# Patient Record
Sex: MALE | Race: WHITE | NOT HISPANIC OR LATINO | Employment: OTHER | URBAN - METROPOLITAN AREA
[De-identification: names, ages, dates, MRNs, and addresses within clinical notes are randomized per-mention and may not be internally consistent; named-entity substitution may affect disease eponyms.]

---

## 2022-08-05 ENCOUNTER — HOSPITAL ENCOUNTER (INPATIENT)
Facility: MEDICAL CENTER | Age: 72
LOS: 12 days | DRG: 872 | End: 2022-08-17
Attending: EMERGENCY MEDICINE | Admitting: STUDENT IN AN ORGANIZED HEALTH CARE EDUCATION/TRAINING PROGRAM
Payer: COMMERCIAL

## 2022-08-05 ENCOUNTER — APPOINTMENT (OUTPATIENT)
Dept: RADIOLOGY | Facility: MEDICAL CENTER | Age: 72
DRG: 872 | End: 2022-08-05
Attending: EMERGENCY MEDICINE
Payer: COMMERCIAL

## 2022-08-05 DIAGNOSIS — G89.3 CHRONIC PAIN DUE TO NEOPLASM: ICD-10-CM

## 2022-08-05 DIAGNOSIS — C18.2 MALIGNANT NEOPLASM OF ASCENDING COLON (HCC): ICD-10-CM

## 2022-08-05 DIAGNOSIS — Z71.89 ADVANCE CARE PLANNING: ICD-10-CM

## 2022-08-05 PROBLEM — A41.9 SEPSIS (HCC): Status: ACTIVE | Noted: 2022-08-05

## 2022-08-05 LAB
ABO + RH BLD: NORMAL
ABO GROUP BLD: NORMAL
ALBUMIN SERPL BCP-MCNC: 3.5 G/DL (ref 3.2–4.9)
ALBUMIN/GLOB SERPL: 0.8 G/DL
ALP SERPL-CCNC: 203 U/L (ref 30–99)
ALT SERPL-CCNC: 11 U/L (ref 2–50)
ANION GAP SERPL CALC-SCNC: 26 MMOL/L (ref 7–16)
ANISOCYTOSIS BLD QL SMEAR: ABNORMAL
AST SERPL-CCNC: 12 U/L (ref 12–45)
BARCODED ABORH UBTYP: 5100
BARCODED PRD CODE UBPRD: NORMAL
BARCODED UNIT NUM UBUNT: NORMAL
BASOPHILS # BLD AUTO: 0 % (ref 0–1.8)
BASOPHILS # BLD: 0 K/UL (ref 0–0.12)
BILIRUB SERPL-MCNC: 0.4 MG/DL (ref 0.1–1.5)
BLD GP AB SCN SERPL QL: NORMAL
BUN SERPL-MCNC: 28 MG/DL (ref 8–22)
CALCIUM SERPL-MCNC: 9.1 MG/DL (ref 8.5–10.5)
CHLORIDE SERPL-SCNC: 91 MMOL/L (ref 96–112)
CO2 SERPL-SCNC: 14 MMOL/L (ref 20–33)
COMPONENT R 8504R: NORMAL
CREAT SERPL-MCNC: 1.47 MG/DL (ref 0.5–1.4)
EOSINOPHIL # BLD AUTO: 0.23 K/UL (ref 0–0.51)
EOSINOPHIL NFR BLD: 0.9 % (ref 0–6.9)
ERYTHROCYTE [DISTWIDTH] IN BLOOD BY AUTOMATED COUNT: 51.8 FL (ref 35.9–50)
GFR SERPLBLD CREATININE-BSD FMLA CKD-EPI: 51 ML/MIN/1.73 M 2
GLOBULIN SER CALC-MCNC: 4.2 G/DL (ref 1.9–3.5)
GLUCOSE SERPL-MCNC: 225 MG/DL (ref 65–99)
HCT VFR BLD AUTO: 25.1 % (ref 42–52)
HGB BLD-MCNC: 6.9 G/DL (ref 14–18)
HGB RETIC QN AUTO: 20 PG/CELL (ref 29–35)
HYPOCHROMIA BLD QL SMEAR: ABNORMAL
IMM RETICS NFR: 34 % (ref 9.3–17.4)
LACTATE SERPL-SCNC: 4.5 MMOL/L (ref 0.5–2)
LYMPHOCYTES # BLD AUTO: 1.66 K/UL (ref 1–4.8)
LYMPHOCYTES NFR BLD: 6.4 % (ref 22–41)
MANUAL DIFF BLD: NORMAL
MCH RBC QN AUTO: 20.9 PG (ref 27–33)
MCHC RBC AUTO-ENTMCNC: 27.5 G/DL (ref 33.7–35.3)
MCV RBC AUTO: 76.1 FL (ref 81.4–97.8)
MICROCYTES BLD QL SMEAR: ABNORMAL
MONOCYTES # BLD AUTO: 0.47 K/UL (ref 0–0.85)
MONOCYTES NFR BLD AUTO: 1.8 % (ref 0–13.4)
MORPHOLOGY BLD-IMP: NORMAL
MYELOCYTES NFR BLD MANUAL: 1.8 %
NEUTROPHILS # BLD AUTO: 23.17 K/UL (ref 1.82–7.42)
NEUTROPHILS NFR BLD: 89.1 % (ref 44–72)
NRBC # BLD AUTO: 0 K/UL
NRBC BLD-RTO: 0 /100 WBC
NT-PROBNP SERPL IA-MCNC: 3728 PG/ML (ref 0–125)
PLATELET # BLD AUTO: 965 K/UL (ref 164–446)
PLATELET BLD QL SMEAR: NORMAL
PMV BLD AUTO: 8.1 FL (ref 9–12.9)
POLYCHROMASIA BLD QL SMEAR: NORMAL
POTASSIUM SERPL-SCNC: 3 MMOL/L (ref 3.6–5.5)
PRODUCT TYPE UPROD: NORMAL
PROT SERPL-MCNC: 7.7 G/DL (ref 6–8.2)
RBC # BLD AUTO: 3.3 M/UL (ref 4.7–6.1)
RBC BLD AUTO: PRESENT
RETICS # AUTO: 0.11 M/UL (ref 0.04–0.06)
RETICS/RBC NFR: 3.7 % (ref 0.8–2.1)
RH BLD: NORMAL
SODIUM SERPL-SCNC: 131 MMOL/L (ref 135–145)
TROPONIN T SERPL-MCNC: 29 NG/L (ref 6–19)
UNIT STATUS USTAT: NORMAL
WBC # BLD AUTO: 26 K/UL (ref 4.8–10.8)

## 2022-08-05 PROCEDURE — 84100 ASSAY OF PHOSPHORUS: CPT

## 2022-08-05 PROCEDURE — 84484 ASSAY OF TROPONIN QUANT: CPT

## 2022-08-05 PROCEDURE — 74176 CT ABD & PELVIS W/O CONTRAST: CPT | Mod: MG

## 2022-08-05 PROCEDURE — 99285 EMERGENCY DEPT VISIT HI MDM: CPT

## 2022-08-05 PROCEDURE — 36415 COLL VENOUS BLD VENIPUNCTURE: CPT

## 2022-08-05 PROCEDURE — 84145 PROCALCITONIN (PCT): CPT

## 2022-08-05 PROCEDURE — 85384 FIBRINOGEN ACTIVITY: CPT

## 2022-08-05 PROCEDURE — A9270 NON-COVERED ITEM OR SERVICE: HCPCS | Performed by: STUDENT IN AN ORGANIZED HEALTH CARE EDUCATION/TRAINING PROGRAM

## 2022-08-05 PROCEDURE — 83735 ASSAY OF MAGNESIUM: CPT

## 2022-08-05 PROCEDURE — 700111 HCHG RX REV CODE 636 W/ 250 OVERRIDE (IP)

## 2022-08-05 PROCEDURE — 700102 HCHG RX REV CODE 250 W/ 637 OVERRIDE(OP): Performed by: STUDENT IN AN ORGANIZED HEALTH CARE EDUCATION/TRAINING PROGRAM

## 2022-08-05 PROCEDURE — 83605 ASSAY OF LACTIC ACID: CPT

## 2022-08-05 PROCEDURE — 96374 THER/PROPH/DIAG INJ IV PUSH: CPT

## 2022-08-05 PROCEDURE — 71045 X-RAY EXAM CHEST 1 VIEW: CPT

## 2022-08-05 PROCEDURE — 85576 BLOOD PLATELET AGGREGATION: CPT | Mod: 91

## 2022-08-05 PROCEDURE — 86850 RBC ANTIBODY SCREEN: CPT

## 2022-08-05 PROCEDURE — 700111 HCHG RX REV CODE 636 W/ 250 OVERRIDE (IP): Performed by: STUDENT IN AN ORGANIZED HEALTH CARE EDUCATION/TRAINING PROGRAM

## 2022-08-05 PROCEDURE — 86900 BLOOD TYPING SEROLOGIC ABO: CPT

## 2022-08-05 PROCEDURE — 5A2204Z RESTORATION OF CARDIAC RHYTHM, SINGLE: ICD-10-PCS | Performed by: EMERGENCY MEDICINE

## 2022-08-05 PROCEDURE — 85046 RETICYTE/HGB CONCENTRATE: CPT

## 2022-08-05 PROCEDURE — 80053 COMPREHEN METABOLIC PANEL: CPT

## 2022-08-05 PROCEDURE — 83036 HEMOGLOBIN GLYCOSYLATED A1C: CPT

## 2022-08-05 PROCEDURE — 87040 BLOOD CULTURE FOR BACTERIA: CPT

## 2022-08-05 PROCEDURE — 85347 COAGULATION TIME ACTIVATED: CPT

## 2022-08-05 PROCEDURE — C9113 INJ PANTOPRAZOLE SODIUM, VIA: HCPCS | Performed by: EMERGENCY MEDICINE

## 2022-08-05 PROCEDURE — 85025 COMPLETE CBC W/AUTO DIFF WBC: CPT

## 2022-08-05 PROCEDURE — 85007 BL SMEAR W/DIFF WBC COUNT: CPT

## 2022-08-05 PROCEDURE — 96375 TX/PRO/DX INJ NEW DRUG ADDON: CPT

## 2022-08-05 PROCEDURE — 86901 BLOOD TYPING SEROLOGIC RH(D): CPT

## 2022-08-05 PROCEDURE — 93005 ELECTROCARDIOGRAM TRACING: CPT | Performed by: EMERGENCY MEDICINE

## 2022-08-05 PROCEDURE — 93005 ELECTROCARDIOGRAM TRACING: CPT

## 2022-08-05 PROCEDURE — 85610 PROTHROMBIN TIME: CPT

## 2022-08-05 PROCEDURE — 700111 HCHG RX REV CODE 636 W/ 250 OVERRIDE (IP): Performed by: EMERGENCY MEDICINE

## 2022-08-05 PROCEDURE — 83880 ASSAY OF NATRIURETIC PEPTIDE: CPT

## 2022-08-05 PROCEDURE — 770020 HCHG ROOM/CARE - TELE (206)

## 2022-08-05 PROCEDURE — 700105 HCHG RX REV CODE 258: Performed by: EMERGENCY MEDICINE

## 2022-08-05 RX ORDER — LABETALOL HYDROCHLORIDE 5 MG/ML
10 INJECTION, SOLUTION INTRAVENOUS EVERY 4 HOURS PRN
Status: DISCONTINUED | OUTPATIENT
Start: 2022-08-05 | End: 2022-08-17 | Stop reason: HOSPADM

## 2022-08-05 RX ORDER — SODIUM CHLORIDE, SODIUM LACTATE, POTASSIUM CHLORIDE, CALCIUM CHLORIDE 600; 310; 30; 20 MG/100ML; MG/100ML; MG/100ML; MG/100ML
INJECTION, SOLUTION INTRAVENOUS CONTINUOUS
Status: ACTIVE | OUTPATIENT
Start: 2022-08-05 | End: 2022-08-06

## 2022-08-05 RX ORDER — BISACODYL 10 MG
10 SUPPOSITORY, RECTAL RECTAL
Status: DISCONTINUED | OUTPATIENT
Start: 2022-08-05 | End: 2022-08-13

## 2022-08-05 RX ORDER — ONDANSETRON 2 MG/ML
INJECTION INTRAMUSCULAR; INTRAVENOUS
Status: COMPLETED
Start: 2022-08-05 | End: 2022-08-05

## 2022-08-05 RX ORDER — POLYETHYLENE GLYCOL 3350 17 G/17G
1 POWDER, FOR SOLUTION ORAL
Status: DISCONTINUED | OUTPATIENT
Start: 2022-08-05 | End: 2022-08-13

## 2022-08-05 RX ORDER — OXYCODONE AND ACETAMINOPHEN 7.5; 325 MG/1; MG/1
1 TABLET ORAL EVERY 6 HOURS PRN
Status: ON HOLD | COMMUNITY
End: 2022-08-17

## 2022-08-05 RX ORDER — ACETAMINOPHEN 325 MG/1
650 TABLET ORAL EVERY 6 HOURS PRN
Status: DISCONTINUED | OUTPATIENT
Start: 2022-08-05 | End: 2022-08-17 | Stop reason: HOSPADM

## 2022-08-05 RX ORDER — BUSPIRONE HYDROCHLORIDE 10 MG/1
2.5 TABLET ORAL DAILY
Status: ON HOLD | COMMUNITY
End: 2022-08-17

## 2022-08-05 RX ORDER — AMOXICILLIN 250 MG
2 CAPSULE ORAL 2 TIMES DAILY
Status: DISCONTINUED | OUTPATIENT
Start: 2022-08-05 | End: 2022-08-13

## 2022-08-05 RX ORDER — SODIUM CHLORIDE, SODIUM LACTATE, POTASSIUM CHLORIDE, AND CALCIUM CHLORIDE .6; .31; .03; .02 G/100ML; G/100ML; G/100ML; G/100ML
30 INJECTION, SOLUTION INTRAVENOUS ONCE
Status: COMPLETED | OUTPATIENT
Start: 2022-08-05 | End: 2022-08-06

## 2022-08-05 RX ORDER — ONDANSETRON 4 MG/1
4 TABLET, ORALLY DISINTEGRATING ORAL EVERY 4 HOURS PRN
Status: DISCONTINUED | OUTPATIENT
Start: 2022-08-05 | End: 2022-08-17 | Stop reason: HOSPADM

## 2022-08-05 RX ORDER — DILTIAZEM HYDROCHLORIDE 5 MG/ML
0.25 INJECTION INTRAVENOUS ONCE
Status: COMPLETED | OUTPATIENT
Start: 2022-08-05 | End: 2022-08-05

## 2022-08-05 RX ORDER — POTASSIUM CHLORIDE 20 MEQ/1
40 TABLET, EXTENDED RELEASE ORAL 2 TIMES DAILY
Status: COMPLETED | OUTPATIENT
Start: 2022-08-05 | End: 2022-08-06

## 2022-08-05 RX ORDER — CHLORTHALIDONE 25 MG/1
25 TABLET ORAL DAILY
Status: ON HOLD | COMMUNITY
End: 2022-08-17

## 2022-08-05 RX ORDER — ONDANSETRON 2 MG/ML
4 INJECTION INTRAMUSCULAR; INTRAVENOUS EVERY 4 HOURS PRN
Status: DISCONTINUED | OUTPATIENT
Start: 2022-08-05 | End: 2022-08-17 | Stop reason: HOSPADM

## 2022-08-05 RX ORDER — OMEPRAZOLE 20 MG/1
20 CAPSULE, DELAYED RELEASE ORAL DAILY
Status: ON HOLD | COMMUNITY
End: 2022-08-17

## 2022-08-05 RX ORDER — ONDANSETRON 2 MG/ML
4 INJECTION INTRAMUSCULAR; INTRAVENOUS ONCE
Status: COMPLETED | OUTPATIENT
Start: 2022-08-05 | End: 2022-08-05

## 2022-08-05 RX ORDER — ALLOPURINOL 300 MG/1
300 TABLET ORAL 2 TIMES DAILY
COMMUNITY

## 2022-08-05 RX ADMIN — CEFTRIAXONE SODIUM 2 G: 10 INJECTION, POWDER, FOR SOLUTION INTRAVENOUS at 23:30

## 2022-08-05 RX ADMIN — SODIUM CHLORIDE, POTASSIUM CHLORIDE, SODIUM LACTATE AND CALCIUM CHLORIDE 3060 ML: 600; 310; 30; 20 INJECTION, SOLUTION INTRAVENOUS at 22:45

## 2022-08-05 RX ADMIN — DILTIAZEM HYDROCHLORIDE 25.5 MG: 5 INJECTION INTRAVENOUS at 21:04

## 2022-08-05 RX ADMIN — ONDANSETRON 4 MG: 2 INJECTION INTRAMUSCULAR; INTRAVENOUS at 21:03

## 2022-08-05 RX ADMIN — POTASSIUM CHLORIDE 40 MEQ: 20 TABLET, EXTENDED RELEASE ORAL at 23:30

## 2022-08-05 RX ADMIN — PANTOPRAZOLE SODIUM 80 MG: 40 INJECTION, POWDER, FOR SOLUTION INTRAVENOUS at 23:00

## 2022-08-06 PROBLEM — K59.00 CONSTIPATION: Status: ACTIVE | Noted: 2022-08-06

## 2022-08-06 PROBLEM — D64.9 ANEMIA: Status: ACTIVE | Noted: 2022-08-06

## 2022-08-06 PROBLEM — C18.2 MALIGNANT NEOPLASM OF ASCENDING COLON (HCC): Status: ACTIVE | Noted: 2022-08-06

## 2022-08-06 PROBLEM — G89.29 PAIN, CHRONIC: Status: ACTIVE | Noted: 2022-08-06

## 2022-08-06 PROBLEM — N17.9 AKI (ACUTE KIDNEY INJURY) (HCC): Status: ACTIVE | Noted: 2022-08-06

## 2022-08-06 PROBLEM — Z71.89 ADVANCE CARE PLANNING: Status: ACTIVE | Noted: 2022-08-06

## 2022-08-06 PROBLEM — I47.10 SVT (SUPRAVENTRICULAR TACHYCARDIA) (HCC): Status: ACTIVE | Noted: 2022-08-06

## 2022-08-06 PROBLEM — R79.89 ELEVATED TROPONIN: Status: ACTIVE | Noted: 2022-08-06

## 2022-08-06 PROBLEM — E87.6 HYPOKALEMIA: Status: ACTIVE | Noted: 2022-08-06

## 2022-08-06 LAB
ANION GAP SERPL CALC-SCNC: 17 MMOL/L (ref 7–16)
ANISOCYTOSIS BLD QL SMEAR: ABNORMAL
APPEARANCE UR: CLEAR
BASOPHILS # BLD AUTO: 0.1 % (ref 0–1.8)
BASOPHILS # BLD: 0.03 K/UL (ref 0–0.12)
BILIRUB UR QL STRIP.AUTO: NEGATIVE
BUN SERPL-MCNC: 24 MG/DL (ref 8–22)
CALCIUM SERPL-MCNC: 8.8 MG/DL (ref 8.5–10.5)
CFT BLD TEG: 3.8 MIN (ref 4.6–9.1)
CFT P HPASE BLD TEG: 3.6 MIN (ref 4.3–8.3)
CHLORIDE SERPL-SCNC: 96 MMOL/L (ref 96–112)
CLOT ANGLE BLD TEG: >83 DEGREES (ref 63–78)
CLOT LYSIS 30M P MA LENFR BLD TEG: 0.2 % (ref 0–2.6)
CO2 SERPL-SCNC: 20 MMOL/L (ref 20–33)
COLOR UR: YELLOW
COMMENT 1642: NORMAL
CREAT SERPL-MCNC: 1 MG/DL (ref 0.5–1.4)
CT.EXTRINSIC BLD ROTEM: 0.6 MIN (ref 0.8–2.1)
EKG IMPRESSION: NORMAL
EKG IMPRESSION: NORMAL
EOSINOPHIL # BLD AUTO: 0 K/UL (ref 0–0.51)
EOSINOPHIL NFR BLD: 0 % (ref 0–6.9)
ERYTHROCYTE [DISTWIDTH] IN BLOOD BY AUTOMATED COUNT: 55.2 FL (ref 35.9–50)
EST. AVERAGE GLUCOSE BLD GHB EST-MCNC: 108 MG/DL
GFR SERPLBLD CREATININE-BSD FMLA CKD-EPI: 80 ML/MIN/1.73 M 2
GLUCOSE SERPL-MCNC: 131 MG/DL (ref 65–99)
GLUCOSE UR STRIP.AUTO-MCNC: NEGATIVE MG/DL
HBA1C MFR BLD: 5.4 % (ref 4–5.6)
HCT VFR BLD AUTO: 25.5 % (ref 42–52)
HGB BLD-MCNC: 7.2 G/DL (ref 14–18)
HYPOCHROMIA BLD QL SMEAR: ABNORMAL
IMM GRANULOCYTES # BLD AUTO: 0.32 K/UL (ref 0–0.11)
IMM GRANULOCYTES NFR BLD AUTO: 1.5 % (ref 0–0.9)
INR PPP: 1.21 (ref 0.87–1.13)
KETONES UR STRIP.AUTO-MCNC: NEGATIVE MG/DL
LACTATE SERPL-SCNC: 1.6 MMOL/L (ref 0.5–2)
LACTATE SERPL-SCNC: 2.4 MMOL/L (ref 0.5–2)
LACTATE SERPL-SCNC: 3.7 MMOL/L (ref 0.5–2)
LEUKOCYTE ESTERASE UR QL STRIP.AUTO: NEGATIVE
LYMPHOCYTES # BLD AUTO: 1.45 K/UL (ref 1–4.8)
LYMPHOCYTES NFR BLD: 7 % (ref 22–41)
MAGNESIUM SERPL-MCNC: 1.8 MG/DL (ref 1.5–2.5)
MCF BLD TEG: >75 MM (ref 52–69)
MCF.PLATELET INHIB BLD ROTEM: >52 MM (ref 15–32)
MCH RBC QN AUTO: 22 PG (ref 27–33)
MCHC RBC AUTO-ENTMCNC: 28.2 G/DL (ref 33.7–35.3)
MCV RBC AUTO: 77.7 FL (ref 81.4–97.8)
MICRO URNS: NORMAL
MICROCYTES BLD QL SMEAR: ABNORMAL
MONOCYTES # BLD AUTO: 0.9 K/UL (ref 0–0.85)
MONOCYTES NFR BLD AUTO: 4.3 % (ref 0–13.4)
MORPHOLOGY BLD-IMP: NORMAL
NEUTROPHILS # BLD AUTO: 18.14 K/UL (ref 1.82–7.42)
NEUTROPHILS NFR BLD: 87.1 % (ref 44–72)
NITRITE UR QL STRIP.AUTO: NEGATIVE
NRBC # BLD AUTO: 0 K/UL
NRBC BLD-RTO: 0 /100 WBC
PA AA BLD-ACNC: ABNORMAL % (ref 0–11)
PA ADP BLD-ACNC: ABNORMAL % (ref 0–17)
PH UR STRIP.AUTO: 5.5 [PH] (ref 5–8)
PHOSPHATE SERPL-MCNC: 3 MG/DL (ref 2.5–4.5)
PLATELET # BLD AUTO: 631 K/UL (ref 164–446)
PLATELET BLD QL SMEAR: NORMAL
PMV BLD AUTO: 7.9 FL (ref 9–12.9)
POLYCHROMASIA BLD QL SMEAR: NORMAL
POTASSIUM SERPL-SCNC: 3.2 MMOL/L (ref 3.6–5.5)
PROCALCITONIN SERPL-MCNC: 0.68 NG/ML
PROT UR QL STRIP: NEGATIVE MG/DL
PROTHROMBIN TIME: 15.1 SEC (ref 12–14.6)
RBC # BLD AUTO: 3.28 M/UL (ref 4.7–6.1)
RBC BLD AUTO: PRESENT
RBC UR QL AUTO: NEGATIVE
SODIUM SERPL-SCNC: 133 MMOL/L (ref 135–145)
SP GR UR STRIP.AUTO: 1.02
TEG ALGORITHM TGALG: ABNORMAL
TROPONIN T SERPL-MCNC: 18 NG/L (ref 6–19)
TROPONIN T SERPL-MCNC: 27 NG/L (ref 6–19)
UROBILINOGEN UR STRIP.AUTO-MCNC: 1 MG/DL
WBC # BLD AUTO: 20.8 K/UL (ref 4.8–10.8)

## 2022-08-06 PROCEDURE — 83605 ASSAY OF LACTIC ACID: CPT

## 2022-08-06 PROCEDURE — 700111 HCHG RX REV CODE 636 W/ 250 OVERRIDE (IP): Performed by: HOSPITALIST

## 2022-08-06 PROCEDURE — 30233N1 TRANSFUSION OF NONAUTOLOGOUS RED BLOOD CELLS INTO PERIPHERAL VEIN, PERCUTANEOUS APPROACH: ICD-10-PCS | Performed by: EMERGENCY MEDICINE

## 2022-08-06 PROCEDURE — 770020 HCHG ROOM/CARE - TELE (206)

## 2022-08-06 PROCEDURE — 99221 1ST HOSP IP/OBS SF/LOW 40: CPT | Performed by: SURGERY

## 2022-08-06 PROCEDURE — 99497 ADVNCD CARE PLAN 30 MIN: CPT | Mod: 25 | Performed by: STUDENT IN AN ORGANIZED HEALTH CARE EDUCATION/TRAINING PROGRAM

## 2022-08-06 PROCEDURE — 87040 BLOOD CULTURE FOR BACTERIA: CPT

## 2022-08-06 PROCEDURE — 36430 TRANSFUSION BLD/BLD COMPNT: CPT

## 2022-08-06 PROCEDURE — 36415 COLL VENOUS BLD VENIPUNCTURE: CPT

## 2022-08-06 PROCEDURE — A9270 NON-COVERED ITEM OR SERVICE: HCPCS | Performed by: STUDENT IN AN ORGANIZED HEALTH CARE EDUCATION/TRAINING PROGRAM

## 2022-08-06 PROCEDURE — 700105 HCHG RX REV CODE 258: Performed by: STUDENT IN AN ORGANIZED HEALTH CARE EDUCATION/TRAINING PROGRAM

## 2022-08-06 PROCEDURE — 80048 BASIC METABOLIC PNL TOTAL CA: CPT

## 2022-08-06 PROCEDURE — 85025 COMPLETE CBC W/AUTO DIFF WBC: CPT

## 2022-08-06 PROCEDURE — 99223 1ST HOSP IP/OBS HIGH 75: CPT | Mod: AI,25 | Performed by: STUDENT IN AN ORGANIZED HEALTH CARE EDUCATION/TRAINING PROGRAM

## 2022-08-06 PROCEDURE — 99222 1ST HOSP IP/OBS MODERATE 55: CPT | Performed by: INTERNAL MEDICINE

## 2022-08-06 PROCEDURE — 700101 HCHG RX REV CODE 250: Performed by: INTERNAL MEDICINE

## 2022-08-06 PROCEDURE — 700102 HCHG RX REV CODE 250 W/ 637 OVERRIDE(OP): Performed by: STUDENT IN AN ORGANIZED HEALTH CARE EDUCATION/TRAINING PROGRAM

## 2022-08-06 PROCEDURE — 81003 URINALYSIS AUTO W/O SCOPE: CPT

## 2022-08-06 PROCEDURE — 84484 ASSAY OF TROPONIN QUANT: CPT

## 2022-08-06 PROCEDURE — 96376 TX/PRO/DX INJ SAME DRUG ADON: CPT

## 2022-08-06 PROCEDURE — 700111 HCHG RX REV CODE 636 W/ 250 OVERRIDE (IP): Performed by: STUDENT IN AN ORGANIZED HEALTH CARE EDUCATION/TRAINING PROGRAM

## 2022-08-06 PROCEDURE — C9113 INJ PANTOPRAZOLE SODIUM, VIA: HCPCS | Performed by: STUDENT IN AN ORGANIZED HEALTH CARE EDUCATION/TRAINING PROGRAM

## 2022-08-06 PROCEDURE — 86923 COMPATIBILITY TEST ELECTRIC: CPT

## 2022-08-06 PROCEDURE — 93005 ELECTROCARDIOGRAM TRACING: CPT | Performed by: HOSPITALIST

## 2022-08-06 PROCEDURE — P9016 RBC LEUKOCYTES REDUCED: HCPCS

## 2022-08-06 PROCEDURE — 700101 HCHG RX REV CODE 250: Performed by: HOSPITALIST

## 2022-08-06 PROCEDURE — 96375 TX/PRO/DX INJ NEW DRUG ADDON: CPT

## 2022-08-06 RX ORDER — MAGNESIUM SULFATE HEPTAHYDRATE 40 MG/ML
2 INJECTION, SOLUTION INTRAVENOUS ONCE
Status: COMPLETED | OUTPATIENT
Start: 2022-08-06 | End: 2022-08-06

## 2022-08-06 RX ORDER — SODIUM BICARBONATE 650 MG/1
650 TABLET ORAL 3 TIMES DAILY PRN
Status: DISCONTINUED | OUTPATIENT
Start: 2022-08-06 | End: 2022-08-11

## 2022-08-06 RX ORDER — METRONIDAZOLE 500 MG/100ML
500 INJECTION, SOLUTION INTRAVENOUS EVERY 8 HOURS
Status: DISCONTINUED | OUTPATIENT
Start: 2022-08-06 | End: 2022-08-08

## 2022-08-06 RX ORDER — OXYCODONE HCL 20 MG/1
20 TABLET, FILM COATED, EXTENDED RELEASE ORAL EVERY 12 HOURS
Status: DISCONTINUED | OUTPATIENT
Start: 2022-08-06 | End: 2022-08-13

## 2022-08-06 RX ORDER — PANTOPRAZOLE SODIUM 40 MG/10ML
40 INJECTION, POWDER, LYOPHILIZED, FOR SOLUTION INTRAVENOUS DAILY
Status: DISCONTINUED | OUTPATIENT
Start: 2022-08-06 | End: 2022-08-07

## 2022-08-06 RX ORDER — CALCIUM CARBONATE 500 MG/1
500 TABLET, CHEWABLE ORAL DAILY
Status: DISCONTINUED | OUTPATIENT
Start: 2022-08-06 | End: 2022-08-06

## 2022-08-06 RX ORDER — MORPHINE SULFATE 4 MG/ML
4 INJECTION INTRAVENOUS EVERY 4 HOURS PRN
Status: DISCONTINUED | OUTPATIENT
Start: 2022-08-06 | End: 2022-08-06

## 2022-08-06 RX ORDER — HYDROMORPHONE HYDROCHLORIDE 1 MG/ML
1 INJECTION, SOLUTION INTRAMUSCULAR; INTRAVENOUS; SUBCUTANEOUS EVERY 4 HOURS PRN
Status: DISCONTINUED | OUTPATIENT
Start: 2022-08-06 | End: 2022-08-07

## 2022-08-06 RX ADMIN — METRONIDAZOLE 500 MG: 5 INJECTION, SOLUTION INTRAVENOUS at 23:03

## 2022-08-06 RX ADMIN — SENNOSIDES AND DOCUSATE SODIUM 2 TABLET: 50; 8.6 TABLET ORAL at 05:17

## 2022-08-06 RX ADMIN — POTASSIUM CHLORIDE 40 MEQ: 20 TABLET, EXTENDED RELEASE ORAL at 05:07

## 2022-08-06 RX ADMIN — HYDROMORPHONE HYDROCHLORIDE 1 MG: 1 INJECTION, SOLUTION INTRAMUSCULAR; INTRAVENOUS; SUBCUTANEOUS at 20:00

## 2022-08-06 RX ADMIN — OXYCODONE HYDROCHLORIDE 20 MG: 20 TABLET, FILM COATED, EXTENDED RELEASE ORAL at 05:07

## 2022-08-06 RX ADMIN — SODIUM CHLORIDE, POTASSIUM CHLORIDE, SODIUM LACTATE AND CALCIUM CHLORIDE: 600; 310; 30; 20 INJECTION, SOLUTION INTRAVENOUS at 03:08

## 2022-08-06 RX ADMIN — HYDROMORPHONE HYDROCHLORIDE 1 MG: 1 INJECTION, SOLUTION INTRAMUSCULAR; INTRAVENOUS; SUBCUTANEOUS at 12:39

## 2022-08-06 RX ADMIN — POLYETHYLENE GLYCOL 3350, SODIUM SULFATE ANHYDROUS, SODIUM BICARBONATE, SODIUM CHLORIDE, POTASSIUM CHLORIDE 4 L: 236; 22.74; 6.74; 5.86; 2.97 POWDER, FOR SOLUTION ORAL at 17:40

## 2022-08-06 RX ADMIN — SENNOSIDES AND DOCUSATE SODIUM 2 TABLET: 50; 8.6 TABLET ORAL at 17:36

## 2022-08-06 RX ADMIN — HYDROMORPHONE HYDROCHLORIDE 1 MG: 1 INJECTION, SOLUTION INTRAMUSCULAR; INTRAVENOUS; SUBCUTANEOUS at 03:47

## 2022-08-06 RX ADMIN — METRONIDAZOLE 500 MG: 5 INJECTION, SOLUTION INTRAVENOUS at 13:40

## 2022-08-06 RX ADMIN — MORPHINE SULFATE 4 MG: 4 INJECTION INTRAVENOUS at 02:26

## 2022-08-06 RX ADMIN — LIDOCAINE HYDROCHLORIDE 30 ML: 20 SOLUTION OROPHARYNGEAL at 02:00

## 2022-08-06 RX ADMIN — PANTOPRAZOLE SODIUM 40 MG: 40 INJECTION, POWDER, LYOPHILIZED, FOR SOLUTION INTRAVENOUS at 05:13

## 2022-08-06 RX ADMIN — CEFTRIAXONE SODIUM 2 G: 10 INJECTION, POWDER, FOR SOLUTION INTRAVENOUS at 23:52

## 2022-08-06 RX ADMIN — MAGNESIUM SULFATE HEPTAHYDRATE 2 G: 40 INJECTION, SOLUTION INTRAVENOUS at 15:14

## 2022-08-06 RX ADMIN — HYDROMORPHONE HYDROCHLORIDE 1 MG: 1 INJECTION, SOLUTION INTRAMUSCULAR; INTRAVENOUS; SUBCUTANEOUS at 08:22

## 2022-08-06 RX ADMIN — OXYCODONE HYDROCHLORIDE 20 MG: 20 TABLET, FILM COATED, EXTENDED RELEASE ORAL at 17:36

## 2022-08-06 RX ADMIN — CALCIUM CARBONATE 500 MG: 500 TABLET, CHEWABLE ORAL at 05:06

## 2022-08-06 ASSESSMENT — ENCOUNTER SYMPTOMS
VOMITING: 0
WHEEZING: 0
PHOTOPHOBIA: 0
TREMORS: 0
WEIGHT LOSS: 1
BACK PAIN: 0
FOCAL WEAKNESS: 0
COUGH: 0
CHILLS: 0
MYALGIAS: 0
CONSTIPATION: 1
HEADACHES: 0
SINUS PAIN: 0
BLURRED VISION: 0
FLANK PAIN: 0
SHORTNESS OF BREATH: 0
CLAUDICATION: 0
POLYDIPSIA: 0
FALLS: 1
HEMOPTYSIS: 0
SORE THROAT: 0
SHORTNESS OF BREATH: 1
STRIDOR: 0
BRUISES/BLEEDS EASILY: 0
TINGLING: 0
DIZZINESS: 0
SPUTUM PRODUCTION: 0
SPEECH CHANGE: 0
FEVER: 0
DOUBLE VISION: 0
VOMITING: 1
NECK PAIN: 0
WEAKNESS: 1
EYE PAIN: 0
HEARTBURN: 0
DIARRHEA: 0
DEPRESSION: 0
WEAKNESS: 0
PND: 0
SENSORY CHANGE: 0
BLOOD IN STOOL: 0
FALLS: 0
CONSTIPATION: 0
PALPITATIONS: 0
NERVOUS/ANXIOUS: 0
DIAPHORESIS: 0
HALLUCINATIONS: 0
ORTHOPNEA: 0
ABDOMINAL PAIN: 1
MYALGIAS: 1
NAUSEA: 1
BACK PAIN: 1

## 2022-08-06 ASSESSMENT — LIFESTYLE VARIABLES
AVERAGE NUMBER OF DAYS PER WEEK YOU HAVE A DRINK CONTAINING ALCOHOL: 0
TOTAL SCORE: 0
TOTAL SCORE: 0
HOW MANY TIMES IN THE PAST YEAR HAVE YOU HAD 5 OR MORE DRINKS IN A DAY: 0
DOES PATIENT WANT TO STOP DRINKING: NO
EVER FELT BAD OR GUILTY ABOUT YOUR DRINKING: NO
HAVE YOU EVER FELT YOU SHOULD CUT DOWN ON YOUR DRINKING: NO
TOTAL SCORE: 0
SUBSTANCE_ABUSE: 0
CONSUMPTION TOTAL: NEGATIVE
ON A TYPICAL DAY WHEN YOU DRINK ALCOHOL HOW MANY DRINKS DO YOU HAVE: 0
HAVE PEOPLE ANNOYED YOU BY CRITICIZING YOUR DRINKING: NO
EVER HAD A DRINK FIRST THING IN THE MORNING TO STEADY YOUR NERVES TO GET RID OF A HANGOVER: NO
ALCOHOL_USE: NO

## 2022-08-06 ASSESSMENT — COGNITIVE AND FUNCTIONAL STATUS - GENERAL
HELP NEEDED FOR BATHING: A LITTLE
MOVING FROM LYING ON BACK TO SITTING ON SIDE OF FLAT BED: A LITTLE
TURNING FROM BACK TO SIDE WHILE IN FLAT BAD: A LITTLE
TOILETING: A LITTLE
MOBILITY SCORE: 16
SUGGESTED CMS G CODE MODIFIER DAILY ACTIVITY: CK
DRESSING REGULAR UPPER BODY CLOTHING: A LITTLE
CLIMB 3 TO 5 STEPS WITH RAILING: A LOT
MOVING TO AND FROM BED TO CHAIR: A LITTLE
STANDING UP FROM CHAIR USING ARMS: A LITTLE
SUGGESTED CMS G CODE MODIFIER MOBILITY: CK
PERSONAL GROOMING: A LITTLE
DRESSING REGULAR LOWER BODY CLOTHING: A LITTLE
EATING MEALS: A LITTLE
DAILY ACTIVITIY SCORE: 18
WALKING IN HOSPITAL ROOM: A LOT

## 2022-08-06 ASSESSMENT — PATIENT HEALTH QUESTIONNAIRE - PHQ9
SUM OF ALL RESPONSES TO PHQ9 QUESTIONS 1 AND 2: 0
2. FEELING DOWN, DEPRESSED, IRRITABLE, OR HOPELESS: NOT AT ALL
1. LITTLE INTEREST OR PLEASURE IN DOING THINGS: NOT AT ALL
SUM OF ALL RESPONSES TO PHQ9 QUESTIONS 1 AND 2: 0
1. LITTLE INTEREST OR PLEASURE IN DOING THINGS: NOT AT ALL
2. FEELING DOWN, DEPRESSED, IRRITABLE, OR HOPELESS: NOT AT ALL

## 2022-08-06 ASSESSMENT — PAIN DESCRIPTION - PAIN TYPE
TYPE: ACUTE PAIN

## 2022-08-06 ASSESSMENT — PAIN SCALES - PAIN ASSESSMENT IN ADVANCED DEMENTIA (PAINAD)
CONSOLABILITY: NO NEED TO CONSOLE
FACIALEXPRESSION: SMILING OR INEXPRESSIVE
BODYLANGUAGE: RELAXED
BREATHING: NORMAL
TOTALSCORE: 0

## 2022-08-06 ASSESSMENT — FIBROSIS 4 INDEX
FIB4 SCORE: 0.41
FIB4 SCORE: 0.41

## 2022-08-06 NOTE — ASSESSMENT & PLAN NOTE
CT showed large mass that is suspected primary colonic cancer. GI and colorectal surgery consulted. 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 General surgery Dr. Bennett following (pending MRI with rectal protocol for staging and surgical intervention. This will be performed on Joann 3 magnet MRI, outpatient location). Oncology following, appreciate recs. Palliative consulted.     -Palliative and primary team meeting with patient and wife today 8/15 and patient has decided on home hospice   -Hgb 8.6 no transfusion required, no serial H/H at this time

## 2022-08-06 NOTE — ED NOTES
Pt was at home and had an acute onset of n/v and abdominal pain about 1.5 hrs prior to EMS arrival. Upon EMS arrival pt was found to have a HR in the 180s, HOTN SBP 70s, 84% on room air. Pt arrives to the ED alert and oriented x 4.

## 2022-08-06 NOTE — ED NOTES
Transfusion consent reviewed with pt and family, all questions answered at this time. Pt signed, placed on chart

## 2022-08-06 NOTE — ASSESSMENT & PLAN NOTE
This is Sepsis Present on admission -resolved  SIRS criteria: Tachycardia, with heart rate greater than 90 BPM, Tachypnea, with respirations greater than 20 per minute and Leukocytosis, with WBC greater than 12,000  Source is Suspected abdominal, possibly just gastroenteritis, symptoms of nausea, vomiting, abdominal pain.   Finished courses of cefuroxime (8/9 - 8/13) and flagyl (8/8 - 8/13)

## 2022-08-06 NOTE — ASSESSMENT & PLAN NOTE
Resolved with IV fluid and 1 dose of IV diltiazem in the ED. During telemetry monitoring, patient had no further episodes of SVT    -Continue Lopressor 25mg bid  -Continue to monitor for signs and symptoms of SVT

## 2022-08-06 NOTE — CONSULTS
Cardiology Initial Consult Note    DOS: 8/6/2022    Referring physician: Dr Johnson    Chief complaint/Reason for consult: SVT    HPI: 71 y/o M without significant prior medical history presented initially with SVT. Converted to NSR with Diltiazem IV. On workup found to be anemic and septic, found to have likely colon cancer probably with at least lymph node metastases. Plan is for GI/surgery workup and evaluation, cardiology asked to evaluate given history of SVT for arvind-operative management. Pt endorses SVT episodes before which have otherwise self-terminated. No prior cardiac history, no chest pain, no SOB, no syncope.     ROS (+ highlighted in bold):  Constitutional: Fevers/chills/fatigue/weightloss  HEENT: Blurry vision/eye pain/sore throat/hearing loss  Respiratory: Shortness of breath/cough  Cardiovascular: Chest pain/palpitations/edema/orthopnea/syncope  GI: Nausea/vomitting/diarrhea  MSK: Arthralgias/myagias/muscle weakness  Skin: Rash/sores  Neurological: Numbness/tremors/vertigo  Endocrine: Excessive thirst/polyuria/cold intolerance/heat intolerance  Psych: Depression/anxiety    Past medical history:  SVT    No past surgical history on file.    Social History     Socioeconomic History   • Marital status:      Spouse name: Not on file   • Number of children: Not on file   • Years of education: Not on file   • Highest education level: Not on file   Occupational History   • Not on file   Tobacco Use   • Smoking status: Not on file   • Smokeless tobacco: Not on file   Substance and Sexual Activity   • Alcohol use: Not on file   • Drug use: Not on file   • Sexual activity: Not on file   Other Topics Concern   • Not on file   Social History Narrative   • Not on file     Social Determinants of Health     Financial Resource Strain: Not on file   Food Insecurity: Not on file   Transportation Needs: Not on file   Physical Activity: Not on file   Stress: Not on file   Social Connections: Not on file    Intimate Partner Violence: Not on file   Housing Stability: Not on file       Family history: Denies history of premature CAD    Allergies   Allergen Reactions   • Aspirin Nausea and Unspecified     Stomach upset (pain)       Current Facility-Administered Medications   Medication Dose Route Frequency Provider Last Rate Last Admin   • pantoprazole (Protonix) injection 40 mg  40 mg Intravenous DAILY Rory Moran M.D.   40 mg at 08/06/22 0513   • sodium bicarbonate tablet 650 mg  650 mg Oral TID PRN Rory Moran M.D.       • HYDROmorphone (Dilaudid) injection 1 mg  1 mg Intravenous Q4HRS PRN Rory Moran M.D.   1 mg at 08/06/22 1239   • oxyCODONE CR (OXYCONTIN) tablet 20 mg  20 mg Oral Q12HRS Rory Moran M.D.   20 mg at 08/06/22 0507   • metroNIDAZOLE (Flagyl) IVPB 500 mg  500 mg Intravenous Q8HRS Nixon Johnson M.D.   Stopped at 08/06/22 1440   • magnesium sulfate IVPB premix 2 g  2 g Intravenous Once Nixon Johnson M.D. 25 mL/hr at 08/06/22 1514 2 g at 08/06/22 1514   • senna-docusate (PERICOLACE or SENOKOT S) 8.6-50 MG per tablet 2 Tablet  2 Tablet Oral BID Rory Moran M.D.   2 Tablet at 08/06/22 0517    And   • polyethylene glycol/lytes (MIRALAX) PACKET 1 Packet  1 Packet Oral QDAY PRN Rory Moran M.D.        And   • magnesium hydroxide (MILK OF MAGNESIA) suspension 30 mL  30 mL Oral QDAY PRN Rory Moran M.D.        And   • bisacodyl (DULCOLAX) suppository 10 mg  10 mg Rectal QDAY PRN Rory Moran M.D.       • acetaminophen (Tylenol) tablet 650 mg  650 mg Oral Q6HRS PRN Rory Moran M.D.       • cefTRIAXone (Rocephin) syringe 2 g  2 g Intravenous Q24HR Rory Moran M.D.   2 g at 08/05/22 2330   • ondansetron (ZOFRAN) syringe/vial injection 4 mg  4 mg Intravenous Q4HRS PRN Rory Moran M.D.       • ondansetron (ZOFRAN ODT) dispertab 4 mg  4 mg Oral Q4HRS PRN Rory Moran M.D.       • labetalol (NORMODYNE/TRANDATE) injection 10 mg  10 mg Intravenous  Q4HRS SHELLEY Moran M.D.           Physical Exam:  Vitals:    08/06/22 0712 08/06/22 0800 08/06/22 1200 08/06/22 1300   BP: 112/65 111/60 113/61 120/77   Pulse: 84 78 90 92   Resp:  17 (!) 22 18   Temp:  36.8 °C (98.2 °F)  37 °C (98.6 °F)   TempSrc:  Temporal  Tympanic   SpO2: 97% 99% 97% 95%   Weight:       Height:         General appearance: NAD, conversant   Eyes: anicteric sclerae, moist conjunctivae; no lid-lag; PERRLA  HENT: Atraumatic; oropharynx clear with moist mucous membranes and no mucosal ulcerations; normal hard and soft palate  Neck: Trachea midline; FROM, supple, no thyromegaly or lymphadenopathy  Lungs: CTA, with normal respiratory effort and no intercostal retractions  CV: RRR, no MRGs, no JVD   Abdomen: Soft, non-tender; no masses or HSM  Extremities: No peripheral edema or extremity lymphadenopathy  Skin: Normal temperature, turgor and texture; no rash, ulcers or subcutaneous nodules  Psych: Appropriate affect, alert and oriented to person, place and time    Data:  Lipids:   No results found for: CHOLSTRLTOT, TRIGLYCERIDE, HDL, LDL     BMP:  Lab Results   Component Value Date/Time    SODIUM 133 (L) 08/06/2022 0245    POTASSIUM 3.2 (L) 08/06/2022 0245    CHLORIDE 96 08/06/2022 0245    CO2 20 08/06/2022 0245    GLUCOSE 131 (H) 08/06/2022 0245    BUN 24 (H) 08/06/2022 0245    CREATININE 1.00 08/06/2022 0245    CALCIUM 8.8 08/06/2022 0245    ANION 17.0 (H) 08/06/2022 0245        TSH:   No results found for: TSHULTRASEN     THYROXINE (T4):   No results found for: FREEDIR     CBC:   Lab Results   Component Value Date/Time    WBC 20.8 (H) 08/06/2022 02:45 AM    RBC 3.28 (L) 08/06/2022 02:45 AM    HEMOGLOBIN 7.2 (L) 08/06/2022 02:45 AM    HEMATOCRIT 25.5 (L) 08/06/2022 02:45 AM    MCV 77.7 (L) 08/06/2022 02:45 AM    MCH 22.0 (L) 08/06/2022 02:45 AM    MCHC 28.2 (L) 08/06/2022 02:45 AM    RDW 55.2 (H) 08/06/2022 02:45 AM    PLATELETCT 631 (H) 08/06/2022 02:45 AM    MPV 7.9 (L) 08/06/2022 02:45 AM     NEUTSPOLYS 87.10 (H) 08/06/2022 02:45 AM    LYMPHOCYTES 7.00 (L) 08/06/2022 02:45 AM    MONOCYTES 4.30 08/06/2022 02:45 AM    EOSINOPHILS 0.00 08/06/2022 02:45 AM    BASOPHILS 0.10 08/06/2022 02:45 AM    IMMGRAN 1.50 (H) 08/06/2022 02:45 AM    NRBC 0.00 08/06/2022 02:45 AM    NEUTS 18.14 (H) 08/06/2022 02:45 AM    LYMPHS 1.45 08/06/2022 02:45 AM    MONOS 0.90 (H) 08/06/2022 02:45 AM    EOS 0.00 08/06/2022 02:45 AM    BASO 0.03 08/06/2022 02:45 AM    IMMGRANAB 0.32 (H) 08/06/2022 02:45 AM    NRBCAB 0.00 08/06/2022 02:45 AM        CBC w/o DIFF  Lab Results   Component Value Date/Time    WBC 20.8 (H) 08/06/2022 02:45 AM    RBC 3.28 (L) 08/06/2022 02:45 AM    HEMOGLOBIN 7.2 (L) 08/06/2022 02:45 AM    MCV 77.7 (L) 08/06/2022 02:45 AM    MCH 22.0 (L) 08/06/2022 02:45 AM    MCHC 28.2 (L) 08/06/2022 02:45 AM    RDW 55.2 (H) 08/06/2022 02:45 AM    MPV 7.9 (L) 08/06/2022 02:45 AM         EKG interpreted by me: NSR    Compared to SVT 8/5/22, NSR as opposed to SVT HR 180bpm    Impression/Plan:  1. SVT  2. Likely Colon cancer new diagnosis  3. Anemia, acute    - For SVT, Reasonable to start metoprolol 25mg PO BID  - He is at an elevated risk of cardiac adverse event due to his non-modifiable circumstances of age and anemia, however SVT management should not increase arvind-operative risk. No current concern for ACS. I no not recommend stress testing at this time. Echocardiogram is pending. If normal EF, I do not advise any further workup prior to surgery.    Thank you for this consult, cardiology signing off.    Jef Raines MD  Cardiac Electrophysiology

## 2022-08-06 NOTE — ASSESSMENT & PLAN NOTE
I discussed advance care planning with the patient and family for at least 22 minutes, including diagnosis, prognosis, plan of care, risks and benefits of any therapies that could be offered, as well as alternatives including palliation and hospice, as appropriate.  DNR, okay with defibrillation.

## 2022-08-06 NOTE — ASSESSMENT & PLAN NOTE
Hussein 2/2 chronic GI losses with large colonic mass suspected to be colon cancer.  Received 2 U pRBCs (8/15, 8/5)    -Hgb 8.6 no transfusion at this time

## 2022-08-06 NOTE — H&P
Hospital Medicine History & Physical Note    Date of Service  8/5/2022    Primary Care Physician  No primary care provider on file.    Consultants  None    Code Status  Full Code    Chief Complaint  Chief Complaint   Patient presents with   • N/V   • Abdominal Pain   • Tachycardia       History of Presenting Illness  Everett Peters is a 72 y.o. male hypertension, hyperlipidemia, depression and generalized anxiety disorder previously on Remeron and buspirone, gout, chronic back pain, does not follow regularly with physician who presented 8/5/2022 with nausea vomiting and abdominal pain, found to be in SVT.  Mr. Peters has been feeling weak and fatigued for the past few months that has been progressive.  He has had weight loss, decreased p.o. intake, occasional constipation, dyspnea on exertion, lower extremity edema, progression of his chronic low back pain.  Symptoms have been progressive and over the past few days he has been having episodes of nausea, vomiting and severe abdominal pain.  Occasionally has black stools that he was attributing to Pepto-Bismol. No alleviating factors everything aggravated with exertion.  Symptoms became so severe he is unable to get up and even make it to the bathroom, requested to be brought in for evaluation, he usually avoids physicians and seeking health care.  There have been no headache or vision changes, no chest pain or cough, no diarrhea.    In the ED he was initially tachycardic up to 184 and SVT, was given IV fluid and diltiazem and converted to sinus rhythm, he is afebrile, respiratory rate from 16-32 he was placed on 10 L nonrebreather to maintain oxygen saturations when he arrived.  Initial work-up with leukocytosis 26,000, hemoglobin 6.9, platelets 965, sodium 131 potassium 3.0 chloride 91 bicarb 14 BUN 28 creatinine 1.47 normal liver function lactic acid 4.5 troponin T 29 proBNP 3728.  .  Chest abdomen pelvis without contrast shows large mass involving the cecum and  ascending colon in keeping with primary colon cancer, multiple enlarged right lower quadrant mesenteric lymph nodes likely metastasis, several mildly prominent retroperitoneal lymph nodes indeterminate, nonobstructive left renal calculus, dilated CBD and intrahepatic bile duct could relate to postcholecystectomy.  Patient was informed of his suspected cancer diagnosis and subsequent referred to hospitalist for admission.    I discussed the plan of care with patient, bedside RN and pharmacy.    Review of Systems  Review of Systems   Constitutional: Positive for malaise/fatigue and weight loss. Negative for chills and fever.   HENT: Negative for congestion and nosebleeds.    Eyes: Negative for blurred vision and double vision.   Respiratory: Positive for shortness of breath. Negative for cough, sputum production and wheezing.    Cardiovascular: Positive for leg swelling. Negative for chest pain and palpitations.   Gastrointestinal: Positive for abdominal pain, constipation, nausea and vomiting. Negative for diarrhea.   Genitourinary: Positive for dysuria. Negative for frequency and urgency.   Musculoskeletal: Positive for back pain, falls and myalgias.   Neurological: Positive for weakness. Negative for sensory change, speech change and focal weakness.   Psychiatric/Behavioral: Negative for substance abuse. The patient is not nervous/anxious.          Past Medical History   has no past medical history on file.    Surgical History   has no past surgical history on file.     Family History  family history is not on file.   Family history reviewed with patient. There is no family history that is pertinent to the chief complaint.     Social History       Allergies  Allergies   Allergen Reactions   • Aspirin Nausea and Unspecified     Stomach upset (pain)       Medications  None       Physical Exam  Temp:  [37.2 °C (98.9 °F)] 37.2 °C (98.9 °F)  Pulse:  [] 94  Resp:  [16-32] 18  BP: ()/(55-67) 105/55  SpO2:  [92  %-100 %] 96 %  Blood Pressure : 108/60   Temperature: 37.2 °C (98.9 °F)   Pulse: (!) 101   Respiration: 17   Pulse Oximetry: 96 %       Physical Exam  Vitals and nursing note reviewed. Exam conducted with a chaperone present.   Constitutional:       General: He is in acute distress.      Appearance: Normal appearance. He is not toxic-appearing.      Comments: 72-year-old male appears stated age alert and conversant able to speak full sentences nontoxic-appearing uncomfortable secondary to back/abdominal pain, wife at bedside   HENT:      Head: Normocephalic and atraumatic.      Nose: Nose normal. No rhinorrhea.      Mouth/Throat:      Mouth: Mucous membranes are dry.      Pharynx: Oropharynx is clear.   Eyes:      Extraocular Movements: Extraocular movements intact.      Conjunctiva/sclera: Conjunctivae normal.      Pupils: Pupils are equal, round, and reactive to light.   Cardiovascular:      Rate and Rhythm: Regular rhythm. Tachycardia present.      Pulses: Normal pulses.      Heart sounds: No murmur heard.  Pulmonary:      Effort: Pulmonary effort is normal. No respiratory distress.      Breath sounds: No wheezing, rhonchi or rales.   Abdominal:      General: There is distension.      Palpations: Abdomen is soft. There is mass.      Tenderness: There is abdominal tenderness. There is no guarding or rebound.      Comments: Right-sided abdominal mass appreciated, mild generalized tenderness   Musculoskeletal:         General: Normal range of motion.      Cervical back: Normal range of motion and neck supple. No rigidity or tenderness.      Right lower leg: Edema present.      Left lower leg: Edema present.   Skin:     General: Skin is warm and dry.      Capillary Refill: Capillary refill takes less than 2 seconds.      Coloration: Skin is pale.   Neurological:      General: No focal deficit present.      Mental Status: He is alert and oriented to person, place, and time. Mental status is at baseline.      Cranial  Nerves: No cranial nerve deficit.      Sensory: No sensory deficit.      Motor: No weakness.      Coordination: Coordination normal.           Laboratory:  Recent Labs     08/05/22 2045   WBC 26.0*   RBC 3.30*   HEMOGLOBIN 6.9*   HEMATOCRIT 25.1*   MCV 76.1*   MCH 20.9*   MCHC 27.5*   RDW 51.8*   PLATELETCT 965*   MPV 8.1*     Recent Labs     08/05/22 2045   SODIUM 131*   POTASSIUM 3.0*   CHLORIDE 91*   CO2 14*   GLUCOSE 225*   BUN 28*   CREATININE 1.47*   CALCIUM 9.1     Recent Labs     08/05/22 2045   ALTSGPT 11   ASTSGOT 12   ALKPHOSPHAT 203*   TBILIRUBIN 0.4   GLUCOSE 225*         Recent Labs     08/05/22 2045   NTPROBNP 3728*         Recent Labs     08/05/22 2045   TROPONINT 29*       Imaging:  CT-CHEST,ABDOMEN,PELVIS W/O   Final Result      Limited exam due to lack of oral or IV contrast.      1. Large mass involving the cecum and ascending colon, in keeping with primary colon cancer.         2.  Multiple enlarged right lower quadrant mesenteric lymph nodes, likely metastasis. Several mildly prominent retroperitoneal lymph nodes, indeterminate.      3. Nonobstructive left renal calculus.      4. Dilated CBD and intrahepatic bile duct could relate to post cholecystectomy status. Correlate with LFTs.      DX-CHEST-PORTABLE (1 VIEW)   Final Result         1. No acute cardiopulmonary abnormalities are identified.          X-Ray:  I have personally reviewed the images and compared with prior images. and My impression is: Chest x-ray negative for acute cardiopulmonary abnormalities  EKG:  I have personally reviewed the images and compared with prior images. and My impression is: Initial EKG supraventricular tachycardia with a rate of 180 and rate related repolarization abnormalities, following EKG showed sinus arrhythmia rate 60-92 PVCs no ST segment elevation or depressions.    Assessment/Plan:  Justification for Admission Status  I anticipate this patient will require at least two midnights for appropriate  medical management, necessitating inpatient admission because Sepsis, MICHAEL    Patient will need a Telemetry bed on TELEMETRY service .  The need is secondary to SVT.    * Sepsis (HCC)- (present on admission)  Assessment & Plan  This is Sepsis Present on admission  SIRS criteria identified on my evaluation include: Tachycardia, with heart rate greater than 90 BPM, Tachypnea, with respirations greater than 20 per minute and Leukocytosis, with WBC greater than 12,000  Source is Suspected abdominal, possibly just gastroenteritis, symptoms of nausea, vomiting, abdominal pain. On C3  Sepsis protocol initiated  Fluid resuscitation ordered per protocol  Crystalloid Fluid Administration: Fluid resuscitation ordered per standard protocol - 30 mL/kg per current or ideal body weight  IV antibiotics as appropriate for source of sepsis  Reassessment: I have reassessed the patient's hemodynamic status          Advance care planning  Assessment & Plan  I discussed advance care planning with the patient and family for at least 22 minutes, including diagnosis, prognosis, plan of care, risks and benefits of any therapies that could be offered, as well as alternatives including palliation and hospice, as appropriate.  DNR, okay with defibrillation.      Pain, chronic- (present on admission)  Assessment & Plan  Scheduled and as needed breakthrough pain medications    SVT (supraventricular tachycardia) ([facility])- (present on admission)  Assessment & Plan  Resolved with IV fluid and 1 dose of IV diltiazem in the ED.  Monitor on telemetry.      Elevated troponin- (present on admission)  Assessment & Plan  Likely secondary to tachyarrhythmia/SVT, no ongoing chest pain, no ST segment elevations or depressions.  Continue to trend troponin.  Monitor on telemetry.  Repeat EKG if he develops any chest pain.    MICHAEL (acute kidney injury) (HCC)- (present on admission)  Assessment & Plan  U/a & CPK  Rule out post obstruction  IVF  Renal dose meds and  avoid nephrotoxins  Monitor I&O's  Follow renal function      Hypokalemia- (present on admission)  Assessment & Plan  P.o. replacement ordered, monitor    Anemia- (present on admission)  Assessment & Plan  Likley 2/2 GI losses with large colonic mass suspected to be colon Ca.  Transfuse hemoglobin less than 7  Serially monitor hemoglobin    Malignant neoplasm of ascending colon (HCC)- (present on admission)  Assessment & Plan  CT shows large mass that is suspected primary colonic cancer.  Oncology consult in the morning.      VTE prophylaxis: pharmacologic prophylaxis contraindicated due to Anemia requiring transfusion

## 2022-08-06 NOTE — ASSESSMENT & PLAN NOTE
Scheduled and as needed breakthrough pain medications. 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

## 2022-08-06 NOTE — CONSULTS
Date of Service:8/6/22       Consult Requested By: Nixon Johnson M.D.    Reason for Consultation: Abnormal CT scan    History of Present Illness:   Everett Peters is a 72 y.o. male who comes in yesterday for feeling weak and lightheaded short of breath and tachycardic.  He has been having some black stools on and off but he attributes that to Pepto-Bismol.  His last colonoscopy was approximate 20 years ago.  He is lost about 20 or so pounds in the last few months which he initially attributed to change in diet.  CT scan of the abdomen demonstrated large nonobstructing cecal mass with apparent regional lymphadenopathy.  We were called for further evaluation and consideration of colonoscopy.    He does also complain of some heartburn indigestion but denies any dysphagia odynophagia hematemesis coffee-ground emesis.    Review Of Systems:  Constitutional: Negative for fever, chills, weight loss, malaise/fatigue and diaphoresis.   HENT: Negative for hearing loss, ear pain, nosebleeds, congestion, sore throat, neck pain, tinnitus and ear discharge.    Eyes: Negative for blurred vision, double vision, photophobia, pain, discharge and redness.   Respiratory: Negative for cough, hemoptysis, sputum production, shortness of breath, wheezing and stridor.    Cardiovascular: Negative for chest pain, palpitations, orthopnea, claudication, leg swelling and PND.   Gastrointestinal: Negative for heartburn, nausea, vomiting, abdominal pain, diarrhea, constipation, blood in stool and melena.   Genitourinary: Negative for dysuria, urgency, frequency, hematuria and flank pain.   Musculoskeletal: Negative for myalgias, back pain, joint pain and falls.   Skin: Negative for itching and rash.  Neurological: Negative for dizziness, tingling, tremors, sensory change, speech change, focal weakness, seizures, loss of consciousness, weakness and headaches.   Endo/Heme/Allergies: Negative for environmental allergies and polydipsia. Does not  bruise/bleed easily.   Psychiatric/Behavioral: Negative for depression, suicidal ideas, hallucinations, memory loss and substance abuse. The patient is not nervous/anxious and does not have insomnia.  PMH:   No past medical history on file.  Hypertension  Hyperlipidemia  Depression  Anxiety  Gout  Chronic back pain    PSH:  No past surgical history on file.  None  FAMILY HX:  No family history on file.  None  SOCIAL HX:  Social History     Socioeconomic History   • Marital status:      Spouse name: Not on file   • Number of children: Not on file   • Years of education: Not on file   • Highest education level: Not on file   Occupational History   • Not on file   Tobacco Use   • Smoking status: Not on file   • Smokeless tobacco: Not on file   Substance and Sexual Activity   • Alcohol use: Not on file   • Drug use: Not on file   • Sexual activity: Not on file   Other Topics Concern   • Not on file   Social History Narrative   • Not on file     Social Determinants of Health     Financial Resource Strain: Not on file   Food Insecurity: Not on file   Transportation Needs: Not on file   Physical Activity: Not on file   Stress: Not on file   Social Connections: Not on file   Intimate Partner Violence: Not on file   Housing Stability: Not on file     Social History     Tobacco Use   Smoking Status Not on file   Smokeless Tobacco Not on file     Social History     Substance and Sexual Activity   Alcohol Use Not on file       Allergies/Intolerances:  Allergies   Allergen Reactions   • Aspirin Nausea and Unspecified     Stomach upset (pain)       History reviewed with the patient    Other Current Medications:    Current Facility-Administered Medications:   •  pantoprazole (Protonix) injection 40 mg, 40 mg, Intravenous, DAILY, Rory Moran M.D., 40 mg at 08/06/22 0513  •  sodium bicarbonate tablet 650 mg, 650 mg, Oral, TID PRN, Rory Moran M.D.  •  HYDROmorphone (Dilaudid) injection 1 mg, 1 mg, Intravenous,  "Q4HRS PRN, Rory Moran M.D., 1 mg at 22 1239  •  oxyCODONE CR (OXYCONTIN) tablet 20 mg, 20 mg, Oral, Q12HRS, Rory Moran M.D., 20 mg at 22 0507  •  metroNIDAZOLE (Flagyl) IVPB 500 mg, 500 mg, Intravenous, Q8HRS, Nixon Johnson M.D., Stopped at 22 1440  •  magnesium sulfate IVPB premix 2 g, 2 g, Intravenous, Once, Nixon Johnson M.D., Last Rate: 25 mL/hr at 22 1514, 2 g at 22 1514  •  senna-docusate (PERICOLACE or SENOKOT S) 8.6-50 MG per tablet 2 Tablet, 2 Tablet, Oral, BID, 2 Tablet at 22 0517 **AND** polyethylene glycol/lytes (MIRALAX) PACKET 1 Packet, 1 Packet, Oral, QDAY PRN **AND** magnesium hydroxide (MILK OF MAGNESIA) suspension 30 mL, 30 mL, Oral, QDAY PRN **AND** bisacodyl (DULCOLAX) suppository 10 mg, 10 mg, Rectal, QDAY PRN, Rory Moran M.D.  •  acetaminophen (Tylenol) tablet 650 mg, 650 mg, Oral, Q6HRS PRN, Rory Moran M.D.  •  cefTRIAXone (Rocephin) syringe 2 g, 2 g, Intravenous, Q24HR, Rory Moran M.D., 2 g at 22 2330  •  ondansetron (ZOFRAN) syringe/vial injection 4 mg, 4 mg, Intravenous, Q4HRS PRN, Rory Moran M.D.  •  ondansetron (ZOFRAN ODT) dispertab 4 mg, 4 mg, Oral, Q4HRS PRN, Rory Moran M.D.  •  labetalol (NORMODYNE/TRANDATE) injection 10 mg, 10 mg, Intravenous, Q4HRS PRN, Rory Moran M.D.  [unfilled]    Most Recent Vital Signs:  /77   Pulse 92   Temp 37 °C (98.6 °F) (Tympanic)   Resp 18   Ht 1.854 m (6' 1\")   Wt 102 kg (225 lb)   SpO2 95%   BMI 29.69 kg/m²   Temp  Av.6 °C (97.9 °F)  Min: 36.3 °C (97.4 °F)  Max: 37.2 °C (98.9 °F)    Physical Exam:  General: Nontoxic, no acute distress  HEENT: sclera anicteric, PERRL, EOMI, MMM, no oral lesions  Neck: supple, no lymphadenopathy  Chest: CTAB, no r/r/w, normal work of breathing.  Cardiac: Regular, no murmurs no gallops heard  Abdomen: + bowel sounds, soft, non-tender, non-distended, no HSM  Extremities: No edema. No joint " "swelling.  Skin: no rashes or erythema  Neuro: Alert and oriented times 3, non-focal exam    Pertinent Lab Results:  Recent Labs     08/05/22 2045 08/06/22 0245   WBC 26.0* 20.8*      Recent Labs     08/05/22 2045 08/06/22 0245   HEMOGLOBIN 6.9* 7.2*   HEMATOCRIT 25.1* 25.5*   MCV 76.1* 77.7*   MCH 20.9* 22.0*   ANISOCYTOSIS 1+ 1+   PLATELETCT 965* 631*         Recent Labs     08/05/22 2045 08/06/22 0245   SODIUM 131* 133*   POTASSIUM 3.0* 3.2*   CHLORIDE 91* 96   CO2 14* 20   CREATININE 1.47* 1.00        Recent Labs     08/05/22 2045   ALBUMIN 3.5      Recent Labs     08/05/22 2045   ASTSGOT 12   ALTSGPT 11   TBILIRUBIN 0.4   ALKPHOSPHAT 203*   GLOBULIN 4.2*   INR 1.21*       [unfilled]      Pertinent Micro:  Results     Procedure Component Value Units Date/Time    Blood Culture [[id number]] Collected: 08/05/22 2240    Order Status: Completed Specimen: Blood from Peripheral Updated: 08/06/22 0739     Significant Indicator NEG     Source BLD     Site PERIPHERAL     Culture Result No Growth  Note: Blood cultures are incubated for 5 days and  are monitored continuously.Positive blood cultures  are called to the RN and reported as soon as  they are identified.      Narrative:      2 of 2 blood culture x2  Sites order. Per Hospital Policy:  Only change Specimen Src: to \"Line\" if specified by physician  order.  Left AC    Blood Culture [[id number]] Collected: 08/06/22 0025    Order Status: Sent Specimen: Blood from Peripheral Updated: 08/06/22 0116    Narrative:      1 of 2 for Blood Culture x 2 sites order. Per Hospital  Policy: Only change Specimen Src: to \"Line\" if specified by  physician order.    URINALYSIS [[id number]] Collected: 08/06/22 0025    Order Status: Completed Specimen: Urine Updated: 08/06/22 0040     Color Yellow     Character Clear     Specific Gravity 1.021     Ph 5.5     Glucose Negative mg/dL      Ketones Negative mg/dL      Protein Negative mg/dL      Bilirubin Negative     Urobilinogen, " Urine 1.0     Nitrite Negative     Leukocyte Esterase Negative     Occult Blood Negative     Micro Urine Req see below     Comment: Microscopic examination not performed when specimen is clear  and chemically negative for protein, blood, leukocyte esterase  and nitrite.             No results found for: BLOODCULTU, BLDCULT, BCHOLD     Studies:                     IMPRESSION:     Limited exam due to lack of oral or IV contrast.     1. Large mass involving the cecum and ascending colon, in keeping with primary colon cancer.        2.  Multiple enlarged right lower quadrant mesenteric lymph nodes, likely metastasis. Several mildly prominent retroperitoneal lymph nodes, indeterminate.     3. Nonobstructive left renal calculus.     4. Dilated CBD and intrahepatic bile duct could relate to post cholecystectomy status. Correlate with LFTs.                                                IMPRESSION:   Abnormal CT scan  Weight loss  Anemia        PLAN:       Given the images on the CT scan 1 is concern for possible colon cancer subsequent we would recommend a colonoscopy.  Recommend making him n.p.o., clear liquids, starting him on a gallon GoLytely with the plans for colonoscopy starting tomorrow.  He has been informed the risks and benefits of the procedure those being bleeding infection possible perforation along with cardiopulmonary compromise.  He is wife are agreeable and I answered all her questions for them.  Consent for colonoscopy  Call office any questions [phone].      Discussed with IM. Will continue to follow    Ruddy Jansen M.D.

## 2022-08-06 NOTE — ASSESSMENT & PLAN NOTE
Related to large right-sided mass    -Continue bowel protocol  -KUB demonstrated small colonic stool and normal gas pattern

## 2022-08-06 NOTE — PROGRESS NOTES
4 Eyes Skin Assessment Completed by BRIE Holder and BRIE Cortez.    Head WDL  Ears WDL  Nose WDL  Mouth WDL  Neck Incision  Breast/Chest WDL  Shoulder Blades WDL - small skin tear on right shoulder from previous fall at home. Small area of redness/dry skin on lower back.   Spine WDL  (R) Arm/Elbow/Hand WDL  (L) Arm/Elbow/Hand WDL  Abdomen WDL  Groin WDL  Scrotum/Coccyx/Buttocks WDL  (R) Leg WDL  (L) Leg WDL  (R) Heel/Foot/Toe Redness and Blanching  (L) Heel/Foot/Toe Redness and Blanching      Devices In Places Tele Box and Blood Pressure Cuff      Interventions In Place Pillows and Low Air Loss Mattress    Possible Skin Injury No    Pictures Uploaded Into Epic Yes  Wound Consult Placed N/A  RN Wound Prevention Protocol Ordered No

## 2022-08-06 NOTE — ED NOTES
Reports weakness for 1 year, diarrhea, urgency, abd pain and decrease in appetite began around 7/29/22

## 2022-08-06 NOTE — ASSESSMENT & PLAN NOTE
Likely secondary to tachyarrhythmia/SVT, no ongoing chest pain, no ST segment elevations or depressions. Troponin trend: 29, 18, 27, 24    -Repeat EKG if he develops any chest pain

## 2022-08-06 NOTE — ED PROVIDER NOTES
ED Provider Note    CHIEF COMPLAINT  Chief Complaint   Patient presents with   • N/V   • Abdominal Pain   • Tachycardia       HPI  Everett Peters is a 72 y.o. male who presents to the emergency department chief complaint of just generally not feeling well.  Patient states a few hours ago he started feeling really sweaty nauseated pale and just some generalized discomfort.  When EMS finally got there the patient was found to be in SVT with hypotension.  He refused cardioversion.  States that he has a history of depression and does not take anything for blood pressure or heart disease.  Is never had an arrhythmia before.  They tried vasovagal's with EMS were unsuccessful.  Patient currently denies any chest pain he can even feel the palpitations or rapid heartbeat.  He states that he has had some abdominal pain on and off since March as well as some constipation issues and has had to take Pepto-Bismol frequently to have normal stools or else he just feels constipated.  According the partner he has had some weight loss but no night sweats.  No current chest pain but he is also had some bilateral lower extremity swelling on and off and worsening over the last few months.    REVIEW OF SYSTEMS  Positives as above. Pertinent negatives include chest pain shortness of breath headache neck stiffness easy bleeding or bruising nausea vomiting fevers chills cough congestion  All other review of systems are negative    PAST MEDICAL HISTORY       SOCIAL HISTORY  Social History     Tobacco Use   • Smoking status: Not on file   • Smokeless tobacco: Not on file   Substance and Sexual Activity   • Alcohol use: Not on file   • Drug use: Not on file   • Sexual activity: Not on file       SURGICAL HISTORY  patient denies any surgical history    CURRENT MEDICATIONS  Home Medications    **Home medications have not yet been reviewed for this encounter**         ALLERGIES  Not on File    PHYSICAL EXAM  VITAL SIGNS: /56   Pulse 180    "Temp 37.2 °C (98.9 °F) (Temporal)   Resp 17   Ht 1.854 m (6' 1\")   Wt 102 kg (225 lb)   SpO2 92%   BMI 29.69 kg/m²    Pulse ox interpretation: I interpret this pulse ox as normal.  Constitutional: Alert mild distress  HENT: Normocephalic atraumatic, dry mucous membranes  Eyes: PER, Conjunctiva pale, Non-icteric.   Neck: Normal range of motion, No tenderness, Supple, No stridor.   Cardiovascular: Regular rhythm tachycardic, no murmurs.   Thorax & Lungs: Normal breath sounds, No respiratory distress, No wheezing, No chest tenderness.   Abdomen: Bowel sounds normal, Soft, No tenderness, No pulsatile masses. No peritoneal signs.  Hepatomegaly felt minimal tenderness in the left lower quadrant no rebound or guarding  Skin: Warm, Dry, No erythema, No rash.   Back: No bony tenderness, No CVA tenderness.   Extremities/MSK: Intact equal distal pulses, No edema, No tenderness, No cyanosis, no major deformities noted  Neurologic: Alert and oriented x3, No focal deficits noted.       DIFFERENTIAL DIAGNOSIS AND WORK UP PLAN    This is a 72 y.o. male who presents with SVT the patient was placed in Trendelenburg and we tried some vasovagal maneuvers which did not cardiovert him, however his blood pressure did improved with a systolic greater than 110.  His IV fluids were started and he will be given a single dose of diltiazem to see if and cardiovert him.  Patient is also had some generalized complaints of not feeling well he appears pale and kind of generally ill on my examination.  Laboratory analysis was ordered at this time as well including a troponin and CBC CMP    DIAGNOSTIC STUDIES / PROCEDURES    EKG  Results for orders placed or performed during the hospital encounter of 08/05/22   EKG   Result Value Ref Range    Report       [facility] Emergency Dept.    Test Date:  2022-08-05  Pt Name:    LINN DENNIS               Department: ER  MRN:        [id number]                      Room:       RD " 08  Gender:     Male                         Technician: [id number]  :        1950                   Requested By:CARLOTA DIAZ  Order #:    [id number]                    Reading MD: Carlota Diaz MD    Measurements  Intervals                                Axis  Rate:       180                          P:          0  OH:         73                           QRS:        5  QRSD:       93                           T:          238  QT:         235  QTc:        407    Interpretive Statements  SVT at a rate of 180  No previous ECG available for comparison  Electronically Signed On 2022 2:54:59 PDT by Carlota Diaz MD     EKG (NOW)   Result Value Ref Range    Report       [facility] Emergency Dept.    Test Date:  2022  Pt Name:    LINN DENNIS               Department: ER  MRN:        [id number]                      Room:        08  Gender:     Male                         Technician: [id number]  :        1950                   Requested By:CARLOTA DIAZ  Order #:    [id number]                    Reading MD: Carlota Diaz MD    Measurements  Intervals                                Axis  Rate:       78                           P:          54  OH:         160                          QRS:        -4  QRSD:       104                          T:          38  QT:         388  QTc:        442    Interpretive Statements  Sinus rate at 78 no ST elevations or ST depressions no pathologic Q waves  normal  intervals normal axis no abnormal T wave inversions PVCs noted  Compared to ECG 2022 20:44:55  sin=us rhythm  Electronically Signed On 2022 2:55:26 PDT by Carlota Diaz MD         LABS  Pertinent Lab Findings    Labs Reviewed   CBC WITH DIFFERENTIAL - Abnormal; Notable for the following components:       Result Value    WBC 26.0 (*)     RBC 3.30 (*)     Hemoglobin 6.9 (*)     Hematocrit 25.1 (*)     MCV 76.1 (*)     MCH 20.9 (*)     MCHC 27.5 (*)     RDW 51.8 (*)      Platelet Count 965 (*)     MPV 8.1 (*)     Neutrophils-Polys 89.10 (*)     Lymphocytes 6.40 (*)     Neutrophils (Absolute) 23.17 (*)     All other components within normal limits   COMP METABOLIC PANEL - Abnormal; Notable for the following components:    Sodium 131 (*)     Potassium 3.0 (*)     Chloride 91 (*)     Co2 14 (*)     Anion Gap 26.0 (*)     Glucose 225 (*)     Bun 28 (*)     Creatinine 1.47 (*)     Alkaline Phosphatase 203 (*)     Globulin 4.2 (*)     All other components within normal limits   TROPONIN - Abnormal; Notable for the following components:    Troponin T 29 (*)     All other components within normal limits   PROBRAIN NATRIURETIC PEPTIDE, NT - Abnormal; Notable for the following components:    NT-proBNP 3728 (*)     All other components within normal limits   ESTIMATED GFR - Abnormal; Notable for the following components:    GFR (CKD-EPI) 51 (*)     All other components within normal limits   LACTIC ACID - Abnormal; Notable for the following components:    Lactic Acid 4.5 (*)     All other components within normal limits   PROTHROMBIN TIME - Abnormal; Notable for the following components:    PT 15.1 (*)     INR 1.21 (*)     All other components within normal limits    Narrative:     If not done within the last 4 hours  Indicate which anticoagulants the patient is on:->UNKNOWN  Do you want to extend TEG graph to LY30? (If no, graph will  terminate at MA)->Yes   PLATELET MAPPING WITH BASIC TEG - Abnormal; Notable for the following components:    Reaction Time Initial-R 3.8 (*)     React Time Initial Hep 3.6 (*)     Clot Kinetics-K 0.6 (*)     Clot Angle-Angle >83.0 (*)     Maximum Clot Strength-MA >75.0 (*)     TEG Functional Fibrinogen(MA) >52.0 (*)     All other components within normal limits    Narrative:     If not done within the last 4 hours  Indicate which anticoagulants the patient is on:->UNKNOWN  Do you want to extend TEG graph to LY30? (If no, graph will  terminate at MA)->Yes  "  RETICULOCYTES COUNT - Abnormal; Notable for the following components:    Reticulocyte Count 3.7 (*)     Retic, Absolute 0.11 (*)     Imm. Reticulocyte Fraction 34.0 (*)     Retic Hgb Equivalent 20.0 (*)     All other components within normal limits    Narrative:     If not done within the last 4 hours  Indicate which anticoagulants the patient is on:->UNKNOWN  Do you want to extend TEG graph to LY30? (If no, graph will  terminate at MA)->Yes   PROCALCITONIN - Abnormal; Notable for the following components:    Procalcitonin 0.68 (*)     All other components within normal limits   DIFFERENTIAL MANUAL   PERIPHERAL SMEAR REVIEW   PLATELET ESTIMATE   MORPHOLOGY   COD (ADULT)   ABO RH CONFIRM   URINALYSIS   MAGNESIUM   PHOSPHORUS   BLOOD CULTURE    Narrative:     1 of 2 for Blood Culture x 2 sites order. Per Hospital  Policy: Only change Specimen Src: to \"Line\" if specified by  physician order.   BLOOD CULTURE    Narrative:     2 of 2 blood culture x2  Sites order. Per Hospital Policy:  Only change Specimen Src: to \"Line\" if specified by physician  order.   LACTIC ACID   HEMOGLOBIN A1C    Narrative:     If not done within the last 4 hours  Indicate which anticoagulants the patient is on:->UNKNOWN  Do you want to extend TEG graph to LY30? (If no, graph will  terminate at MA)->Yes   CBC WITH DIFFERENTIAL   BASIC METABOLIC PANEL   RELEASE RED BLOOD CELLS   TRANSFUSE RED BLOOD CELLS-NURSING COMMUNICATION (UNITS)       RADIOLOGY  CT-CHEST,ABDOMEN,PELVIS W/O   Final Result      Limited exam due to lack of oral or IV contrast.      1. Large mass involving the cecum and ascending colon, in keeping with primary colon cancer.         2.  Multiple enlarged right lower quadrant mesenteric lymph nodes, likely metastasis. Several mildly prominent retroperitoneal lymph nodes, indeterminate.      3. Nonobstructive left renal calculus.      4. Dilated CBD and intrahepatic bile duct could relate to post cholecystectomy status. Correlate " with LFTs.      DX-CHEST-PORTABLE (1 VIEW)   Final Result         1. No acute cardiopulmonary abnormalities are identified.        The radiologist's interpretation of all radiological studies have been reviewed by me.    Cardioversion Procedure    Indication: supraventricular tachycardia    Consent: Unable to be obtained due to patient's condition.    Pre-Medication: none    Procedure: Patient was treated with 25 mg of IV diltiazem with cardioversion into a sinus rhythm.    The patient tolerated the procedure well.    Complications: None        COURSE & MEDICAL DECISION MAKING  Pertinent Labs & Imaging studies reviewed. (See chart for details)    9:08 PM  At this time the patient tolerated a successful diltiazem push and his blood pressure is holding just fine.  Were still pending his laboratory analysis but his bedside chest x-ray is clear.    10:28 PM  I reassessed patient at bedside we talked a little further about everything is been going on with him he has had some weight loss no real night sweats but based on his examination with hepatomegaly elevated white blood cell count severe anemia he states he only gets dark stools when he takes Pepto-Bismol to help with his bowel movements but a bedside guaiac at this time reveals a brown stool which is guaiac positive.  I am very concerned the patient has cancer just based on my physical examination this chronic anemia and this lactic acidosis the SVT the pallor the weight loss is hepatomegaly.  Plan is for CT chest abdomen pelvis and an IV fluid continuation as well as sepsis protocol    Also with a hemoglobin less than 7 he has been consented for blood at this time.    I have explained to the patient the risks and benefits of transfusion of blood products.  This includes, as appropriate, the risk of mild allergic reaction, hemolytic reaction, transfusion-associated lung injury, febrile reactions, circulatory or iron overload, and infection.    We discussed possible  "alternatives and their risks, including directed donation, autologous transfusion, and no transfusion, including IV or oral iron supplementation, as appropriate.  I believe the patient understands the risks and benefits and was able to express understanding.      10:36 PM  Guaiac BROWN STOOL, GUAIAC POSITIVE       I spoke with Dr. Moran is excepted the patient for hospitalization.      Unfortunately the patient does have evidence of primary colon cancer without evidence of bowel obstruction.  I discussed this with him and his wife they understand they are grateful that we have the knowledge and they feel comfortable moving forward to further determine the course that he would like.    CRITICAL CARE  The very real possibilty of a deterioration of this patient's condition required the highest level of my preparedness for sudden, emergent intervention.  I provided critical care services, which included medication orders, frequent reevaluations of the patient's condition and response to treatment, ordering and reviewing test results, and discussing the case with various consultants.  The critical care time associated with the care of the patient was 45 minutes. Review chart for interventions. This time is exclusive of any other billable procedures.     /55   Pulse 89   Temp 36.3 °C (97.4 °F) (Temporal)   Resp 17   Ht 1.854 m (6' 1\")   Wt 102 kg (225 lb)   SpO2 95%   BMI 29.69 kg/m²         I verified that the patient was wearing a mask and I was wearing appropriate PPE every time I entered the room. The patient's mask was on the patient at all times during my encounter except for a brief view of the oropharynx.          FINAL IMPRESSION  1.  SVT  2.  Lactic acidosis  3.  MICHAEL  4.  Hepatomegaly  5.  SIRS criteria  6.  Primary colon cancer first diagnosis  7.  Guaiac positive stool  8.  Iron deficiency anemia    Critical care time 45 min     Electronically signed by: Carlota uPrvis M.D., 8/5/2022 8:51 " PM    This dictation has been created using voice recognition software and/or scribes. The accuracy of the dictation is limited by the abilities of the software and the expertise of the scribes. I expect there may be some errors of grammar and possibly content. I made every attempt to manually correct the errors within my dictation. However, errors related to voice recognition software and/or scribes may still exist and should be interpreted within the appropriate context.

## 2022-08-06 NOTE — PROGRESS NOTES
[facility] Medicine Daily Progress Note    Date of Service  8/6/2022    Chief Complaint  Everett Peters is a 72 y.o. male admitted 8/5/2022 with  hypertension, hyperlipidemia, depression and generalized anxiety disorder previously on Remeron and buspirone, gout, chronic back pain, does not follow regularly with physician who presented 8/5/2022 with nausea vomiting and abdominal pain. He has had weight loss, decreased p.o. intake, occasional constipation, dyspnea on exertion, lower extremity edema, progression of his chronic low back pain. Mr. Peters has been feeling weak and fatigued for the past few months that has been progressive.     Hospital Course  In the ED he was initially tachycardic up to 184 and SVT, was given IV fluid and diltiazem and converted to sinus rhythm, he is afebrile, respiratory rate from 16-32 he was placed on 10 L nonrebreather to maintain oxygen saturations when he arrived.  Initial work-up with leukocytosis 26,000, hemoglobin 6.9, platelets 965, sodium 131 potassium 3.0 chloride 91 bicarb 14 BUN 28 creatinine 1.47 normal liver function lactic acid 4.5 troponin T 29 proBNP 3728.  .  Chest abdomen pelvis without contrast shows large mass involving the cecum and ascending colon in keeping with primary colon cancer, multiple enlarged right lower quadrant mesenteric lymph nodes likely metastasis, several mildly prominent retroperitoneal lymph nodes indeterminate, nonobstructive left renal calculus, dilated CBD and intrahepatic bile duct could relate to postcholecystectomy.    Interval Problem Update  8/6: Had a long discussion with him and his wife today in regards to his prognosis, findings on CT scan of the abdomen.  I spoke with GI and surgery who will evaluate the patient.  They requested a cardiac consult since he was having ST depressions and SVT.  We will continue to monitor his electrolytes.  Cardiac echo is pending.    I have discussed this patient's plan of care and discharge plan at  IDT rounds today with Case Management, Nursing, Nursing leadership, and other members of the IDT team.    Consultants/Specialty  cardiology, general surgery and GI    Code Status  DNAR/DNI    Disposition  Patient is not medically cleared for discharge.   Anticipate discharge to to home with close outpatient follow-up.  I have placed the appropriate orders for post-discharge needs.    Review of Systems  Review of Systems   Constitutional: Positive for malaise/fatigue and weight loss. Negative for chills, diaphoresis and fever.   HENT: Negative for congestion, ear discharge, ear pain, hearing loss, nosebleeds, sinus pain, sore throat and tinnitus.    Eyes: Negative for blurred vision, double vision, photophobia and pain.   Respiratory: Negative for cough, hemoptysis, sputum production, shortness of breath, wheezing and stridor.    Cardiovascular: Negative for chest pain, palpitations, orthopnea, claudication, leg swelling and PND.   Gastrointestinal: Positive for abdominal pain and nausea. Negative for blood in stool, constipation, diarrhea, heartburn, melena and vomiting.   Genitourinary: Negative for dysuria, flank pain, frequency, hematuria and urgency.   Musculoskeletal: Negative for back pain, falls, joint pain, myalgias and neck pain.   Skin: Negative for itching and rash.   Neurological: Negative for dizziness, tingling, tremors, weakness and headaches.   Endo/Heme/Allergies: Negative for environmental allergies and polydipsia. Does not bruise/bleed easily.   Psychiatric/Behavioral: Negative for depression, hallucinations, substance abuse and suicidal ideas.        Physical Exam  Temp:  [36.3 °C (97.4 °F)-37.2 °C (98.9 °F)] 36.8 °C (98.2 °F)  Pulse:  [] 92  Resp:  [16-56] 24  BP: ()/(55-77) 120/77  SpO2:  [89 %-100 %] 95 %    Physical Exam  Vitals and nursing note reviewed.   Constitutional:       General: He is not in acute distress.     Appearance: Normal appearance. He is not ill-appearing,  toxic-appearing or diaphoretic.   HENT:      Head: Normocephalic and atraumatic.      Nose: No congestion or rhinorrhea.      Mouth/Throat:      Pharynx: No posterior oropharyngeal erythema.   Eyes:      General: No scleral icterus.        Right eye: No discharge.   Cardiovascular:      Rate and Rhythm: Normal rate and regular rhythm.      Pulses: Normal pulses.      Heart sounds: Normal heart sounds. No murmur heard.    No friction rub. No gallop.   Pulmonary:      Effort: Pulmonary effort is normal. No respiratory distress.      Breath sounds: Normal breath sounds. No stridor. No wheezing, rhonchi or rales.   Abdominal:      General: There is no distension.      Tenderness: There is no abdominal tenderness.      Comments: Hypoactive bowel sounds   Musculoskeletal:         General: No swelling, tenderness, deformity or signs of injury.      Cervical back: Normal range of motion.      Right lower leg: No edema.      Left lower leg: No edema.   Skin:     Capillary Refill: Capillary refill takes less than 2 seconds.      Coloration: Skin is not jaundiced or pale.      Findings: No bruising, erythema, lesion or rash.   Neurological:      General: No focal deficit present.      Mental Status: He is alert and oriented to person, place, and time.         Fluids    Intake/Output Summary (Last 24 hours) at 8/6/2022 1408  Last data filed at 8/6/2022 0224  Gross per 24 hour   Intake 5700 ml   Output 275 ml   Net 5425 ml       Laboratory  Recent Labs     08/05/22 2045 08/06/22  0245   WBC 26.0* 20.8*   RBC 3.30* 3.28*   HEMOGLOBIN 6.9* 7.2*   HEMATOCRIT 25.1* 25.5*   MCV 76.1* 77.7*   MCH 20.9* 22.0*   MCHC 27.5* 28.2*   RDW 51.8* 55.2*   PLATELETCT 965* 631*   MPV 8.1* 7.9*     Recent Labs     08/05/22 2045 08/06/22  0245   SODIUM 131* 133*   POTASSIUM 3.0* 3.2*   CHLORIDE 91* 96   CO2 14* 20   GLUCOSE 225* 131*   BUN 28* 24*   CREATININE 1.47* 1.00   CALCIUM 9.1 8.8     Recent Labs     08/05/22 2045   INR 1.21*                Imaging  CT-CHEST,ABDOMEN,PELVIS W/O   Final Result      Limited exam due to lack of oral or IV contrast.      1. Large mass involving the cecum and ascending colon, in keeping with primary colon cancer.         2.  Multiple enlarged right lower quadrant mesenteric lymph nodes, likely metastasis. Several mildly prominent retroperitoneal lymph nodes, indeterminate.      3. Nonobstructive left renal calculus.      4. Dilated CBD and intrahepatic bile duct could relate to post cholecystectomy status. Correlate with LFTs.      DX-CHEST-PORTABLE (1 VIEW)   Final Result         1. No acute cardiopulmonary abnormalities are identified.      EC-ECHOCARDIOGRAM COMPLETE W/O CONT    (Results Pending)        Assessment/Plan  * Malignant neoplasm of ascending colon (HCC)- (present on admission)  Assessment & Plan  CT shows large mass that is suspected primary colonic cancer.  GI and colorectal surgery has been consulted    Constipation  Assessment & Plan  Related to large right-sided mass  Continue bowel protocol  Patient will need clear bowels for colonoscopy    Advance care planning- (present on admission)  Assessment & Plan  I discussed advance care planning with the patient and family for at least 22 minutes, including diagnosis, prognosis, plan of care, risks and benefits of any therapies that could be offered, as well as alternatives including palliation and hospice, as appropriate.  DNR, okay with defibrillation.      Pain, chronic- (present on admission)  Assessment & Plan  Scheduled and as needed breakthrough pain medications    SVT (supraventricular tachycardia) (HCC)- (present on admission)  Assessment & Plan  Resolved with IV fluid and 1 dose of IV diltiazem in the ED.  Monitor on telemetry.      Elevated troponin- (present on admission)  Assessment & Plan  Likely secondary to tachyarrhythmia/SVT, no ongoing chest pain, no ST segment elevations or depressions.  Continue to trend troponin.  Monitor on  telemetry.  Repeat EKG if he develops any chest pain.    MICHAEL (acute kidney injury) (HCC)- (present on admission)  Assessment & Plan  Resolved      Hypokalemia- (present on admission)  Assessment & Plan  P.o. replacement ordered, monitor    Anemia- (present on admission)  Assessment & Plan  Likley 2/2 chronic GI losses with large colonic mass suspected to be colon Ca.  Transfuse hemoglobin less than 7  Serially monitor hemoglobin    Sepsis (HCC)- (present on admission)  Assessment & Plan  This is Sepsis Present on admission  SIRS criteria identified on my evaluation include: Tachycardia, with heart rate greater than 90 BPM, Tachypnea, with respirations greater than 20 per minute and Leukocytosis, with WBC greater than 12,000  Source is Suspected abdominal, possibly just gastroenteritis, symptoms of nausea, vomiting, abdominal pain. On C3  Sepsis protocol initiated  Fluid resuscitation ordered per protocol  Crystalloid Fluid Administration: Fluid resuscitation ordered per standard protocol - 30 mL/kg per current or ideal body weight  IV antibiotics as appropriate for source of sepsis  Reassessment: I have reassessed the patient's hemodynamic status             VTE prophylaxis: SCDs/TEDs    I have performed a physical exam and reviewed and updated ROS and Plan today (8/6/2022). In review of yesterday's note (8/5/2022), there are no changes except as documented above.

## 2022-08-06 NOTE — ED NOTES
Med Rec Complete per patient & patient's spouse  Allergies Reviewed with patient  No antibiotics within the last 30 days  Patient's Preferred Pharmacy: [facility]    Patient would like to participate in Meds to Beds

## 2022-08-06 NOTE — PROGRESS NOTES
Pt had short run of SVT lasting <1 min while standing at EOB. Pt /70. Flipped back into NSR. MD aware.

## 2022-08-06 NOTE — CONSULTS
Surgical History and Physical    Date of Service: 8/6/2022    Requesting Physician: Nixon Johnson MD - Medicine    Reason for Consultation: Abdominal Pain    HPI: This is a 72 y.o. male who is presenting with weight loss, fatigue, anorexia, nausea/vomiting, and severe abdominal pain that is worsened by activity.  He reports occasional black stools.  His last colonoscopy was over 20 years ago.  He came to the ER for evaluation and had a supra-ventricular tachycardia.  He was found to be critically anemic and septic with a WBC of 26 and a metabolic lactic acidosis.  Acute kidney injury was present as well.  He was resuscitated and treated for his sepsis.  CT imaging was performed which showed a large, non-obstructed cecal mass with apparent regional lymphadenopathy.      The patient was evaluated at bedside in the ER.  He denies significant abdominal pain and nausea at this time.  Reports he is feeling better overall since coming to the hospital.      PAST MEDICAL HISTORY:   Hypertension  Hyperlipidemia  Depression  Anxiety  Gout  Chronic back pain    PAST SURGICAL HISTORY:   No significant or pertinent past surgical history     ALLERGIES: Aspirin       CURRENT MEDICATIONS:   Outpatient Medications Marked as Taking for the 8/5/22 encounter (Hospital Encounter)   Medication Sig   • allopurinol (ZYLOPRIM) 300 MG Tab Take 300 mg by mouth 2 times a day.   • busPIRone (BUSPAR) 10 MG Tab tablet Take 2.5 mg by mouth every day. 2.5 mg = 1/4 tablet   • chlorthalidone (HYGROTON) 25 MG Tab Take 25 mg by mouth every day.   • omeprazole (PRILOSEC) 20 MG delayed-release capsule Take 20 mg by mouth every day.   • oxyCODONE-acetaminophen (PERCOCET) 7.5-325 MG per tablet Take 1 Tablet by mouth every 6 hours as needed.         FAMILY HISTORY:   Reviewed and found to be non-contributory in regards to the above presentation    SOCIAL HISTORY:  Patient denies habitual use of alcohol, tobacco, and illicit drugs    Review of  "Systems:  Constitutional: Negative for fever, chills, weight loss, malaise/fatigue and diaphoresis.   HENT: Negative for hearing loss, ear pain, nosebleeds, congestion, sore throat, neck pain, tinnitus and ear discharge.    Eyes: Negative for blurred vision, double vision, photophobia, pain, discharge and redness.   Respiratory: Negative for cough, hemoptysis, sputum production, shortness of breath, wheezing and stridor.    Cardiovascular: Negative for chest pain, palpitations, orthopnea, claudication, leg swelling and PND.   Gastrointestinal: Negative for heartburn, nausea, vomiting, abdominal pain, diarrhea, constipation, blood in stool and melena.   Genitourinary: Negative for dysuria, urgency, frequency, hematuria and flank pain.   Musculoskeletal: Negative for myalgias, back pain, joint pain and falls.   Skin: Negative for itching and rash.  Neurological: Negative for dizziness, tingling, tremors, sensory change, speech change, focal weakness, seizures, loss of consciousness, weakness and headaches.   Endo/Heme/Allergies: Negative for environmental allergies and polydipsia. Does not bruise/bleed easily.   Psychiatric/Behavioral: Negative for depression, suicidal ideas, hallucinations, memory loss and substance abuse. The patient is not nervous/anxious and does not have insomnia.    Physical Exam:  /61   Pulse 90   Temp 36.8 °C (98.2 °F) (Temporal)   Resp (!) 22   Ht 1.854 m (6' 1\")   Wt 102 kg (225 lb)   SpO2 97%   Vitals:    08/06/22 1200   BP: 113/61   Pulse: 90   Resp: (!) 22   Temp:    SpO2: 97%     GENERAL:  Mildly ill-appearing and in no acute distress  HEENT:  Atraumatic, normocephalic.  Normal pinna bilaterally.  External auditory canals are without discharge.  Conjunctivae and sclerae are clear. Extraocular movements are full. Pupils are equal, round, and reactive to light.  Oral mucosa is moist.  NECK:  Soft and supple without lymphadenopathy. No masses are noted.  Thyroid is of normal " size and texture.  Trachea is midline.  CHEST:  Lungs are clear to auscultation bilaterally.  No masses, lesions, or signs of trauma were noted.     CARDIOVASCULAR:  Regular rate and rhythm.  No murmurs appreciated.  No JVD.  Palpable pulses present in all four extremities.    ABDOMEN:  Soft, non-tender, hard and fixed softball sized mass palpated at the right lateral abdomen, mildly distended.  Non-tympanitic.    GENITOURINARY:  Normal external reproductive anatomy.  MUSCULOSKELETAL: Normal range of motion x4 extremities.    SKIN:  Warm and well perfused. No rashes.  NEUROLOGIC:  Alert and oriented. Cranial nerves II through XII are grossly intact. Motor and sensory exams are normal in all four extremities. Motor and sensory reflexes are 2+ and symmetric with bilateral plantar responses.  PSYCHIATRIC: Affect and mood is appropriate for age and condition.    Labs:  Recent Labs     08/05/22 2045 08/06/22  0245   WBC 26.0* 20.8*   RBC 3.30* 3.28*   HEMOGLOBIN 6.9* 7.2*   HEMATOCRIT 25.1* 25.5*   MCV 76.1* 77.7*   MCH 20.9* 22.0*   MCHC 27.5* 28.2*   RDW 51.8* 55.2*   PLATELETCT 965* 631*   MPV 8.1* 7.9*     Recent Labs     08/05/22 2045 08/06/22  0245   SODIUM 131* 133*   POTASSIUM 3.0* 3.2*   CHLORIDE 91* 96   CO2 14* 20   GLUCOSE 225* 131*   BUN 28* 24*   CREATININE 1.47* 1.00   CALCIUM 9.1 8.8     Recent Labs     08/05/22 2045   INR 1.21*     Recent Labs     08/05/22 2045   ASTSGOT 12   ALTSGPT 11   TBILIRUBIN 0.4   ALKPHOSPHAT 203*   GLOBULIN 4.2*   INR 1.21*       Radiology:  CT-CHEST,ABDOMEN,PELVIS W/O   Final Result      Limited exam due to lack of oral or IV contrast.      1. Large mass involving the cecum and ascending colon, in keeping with primary colon cancer.         2.  Multiple enlarged right lower quadrant mesenteric lymph nodes, likely metastasis. Several mildly prominent retroperitoneal lymph nodes, indeterminate.      3. Nonobstructive left renal calculus.      4. Dilated CBD and intrahepatic  bile duct could relate to post cholecystectomy status. Correlate with LFTs.      DX-CHEST-PORTABLE (1 VIEW)   Final Result         1. No acute cardiopulmonary abnormalities are identified.      EC-ECHOCARDIOGRAM COMPLETE W/O CONT    (Results Pending)       Assessment/Plan:   1) Cecal Mass:    Features of regional lymph node involvement on CT.  All of the patient's symptoms, exam findings, and imaging is highly suspicious for malignancy.  Presently not obstructed.  Abdomen non-acute on exam.  Resolving septic picture that is not explained by his CT imaging at admission.  No indication for emergency surgery at this time.  Outlined a plan of GI and colorectal consultation with the patient and his wife at bedside.      -Recommend liquid diet until evaluated by consultants  -Emergency general surgery signing off.    I independently reviewed pertinent clinical lab tests since admission and ordered additional follow up clinical lab tests.  I independently reviewed pertinent radiographic images and the radiologist's reports since admission and ordered additional follow up radiographic studies.  The details of the available patient records in [facility] (including laboratory tests, culture data, medications, imaging, and other pertinent diagnostic tests) and that information was utilized as warranted in today's medical decision making for this patient.  I personally evaluated the patient condition at bedside.    Care interventions include:   Review of interval medical and surgical history, current medications and outpatient medication reconciliation, interval imaging studies and radiologist interpretation and interval laboratory values.  Evaluation of cecal mass, coordination of consultant care - gastroenterology, colorectal surgery.    Aggregated care time spent evaluating, reassessing, reviewing documentation, providing care, and managing this patient exclusive of procedures: 60 minutes  ____________________________________    Shola Nagy MD, FACS   JRU / NTS     DD: 8/6/2022   DT: 12:54 PM

## 2022-08-07 ENCOUNTER — ANESTHESIA (OUTPATIENT)
Dept: SURGERY | Facility: MEDICAL CENTER | Age: 72
DRG: 872 | End: 2022-08-07
Payer: COMMERCIAL

## 2022-08-07 ENCOUNTER — APPOINTMENT (OUTPATIENT)
Dept: RADIOLOGY | Facility: MEDICAL CENTER | Age: 72
DRG: 872 | End: 2022-08-07
Payer: COMMERCIAL

## 2022-08-07 ENCOUNTER — APPOINTMENT (OUTPATIENT)
Dept: RADIOLOGY | Facility: MEDICAL CENTER | Age: 72
DRG: 872 | End: 2022-08-07
Attending: SURGERY
Payer: COMMERCIAL

## 2022-08-07 ENCOUNTER — ANESTHESIA EVENT (OUTPATIENT)
Dept: SURGERY | Facility: MEDICAL CENTER | Age: 72
DRG: 872 | End: 2022-08-07
Payer: COMMERCIAL

## 2022-08-07 LAB
ALBUMIN SERPL BCP-MCNC: 3.2 G/DL (ref 3.2–4.9)
ALBUMIN/GLOB SERPL: 0.8 G/DL
ALP SERPL-CCNC: 186 U/L (ref 30–99)
ALT SERPL-CCNC: 9 U/L (ref 2–50)
ANION GAP SERPL CALC-SCNC: 16 MMOL/L (ref 7–16)
AST SERPL-CCNC: 15 U/L (ref 12–45)
BASOPHILS # BLD AUTO: 0.2 % (ref 0–1.8)
BASOPHILS # BLD: 0.04 K/UL (ref 0–0.12)
BILIRUB SERPL-MCNC: 0.6 MG/DL (ref 0.1–1.5)
BUN SERPL-MCNC: 16 MG/DL (ref 8–22)
CALCIUM SERPL-MCNC: 8.8 MG/DL (ref 8.5–10.5)
CEA SERPL-MCNC: 4.5 NG/ML (ref 0–3)
CHLORIDE SERPL-SCNC: 94 MMOL/L (ref 96–112)
CO2 SERPL-SCNC: 22 MMOL/L (ref 20–33)
CREAT SERPL-MCNC: 0.77 MG/DL (ref 0.5–1.4)
D DIMER PPP IA.FEU-MCNC: 2.13 UG/ML (FEU) (ref 0–0.5)
EKG IMPRESSION: NORMAL
EKG IMPRESSION: NORMAL
EOSINOPHIL # BLD AUTO: 0.02 K/UL (ref 0–0.51)
EOSINOPHIL NFR BLD: 0.1 % (ref 0–6.9)
ERYTHROCYTE [DISTWIDTH] IN BLOOD BY AUTOMATED COUNT: 50.4 FL (ref 35.9–50)
GFR SERPLBLD CREATININE-BSD FMLA CKD-EPI: 95 ML/MIN/1.73 M 2
GLOBULIN SER CALC-MCNC: 4 G/DL (ref 1.9–3.5)
GLUCOSE SERPL-MCNC: 102 MG/DL (ref 65–99)
HCT VFR BLD AUTO: 24.3 % (ref 42–52)
HGB BLD-MCNC: 7.2 G/DL (ref 14–18)
IMM GRANULOCYTES # BLD AUTO: 0.1 K/UL (ref 0–0.11)
IMM GRANULOCYTES NFR BLD AUTO: 0.6 % (ref 0–0.9)
LYMPHOCYTES # BLD AUTO: 2.42 K/UL (ref 1–4.8)
LYMPHOCYTES NFR BLD: 14.3 % (ref 22–41)
MAGNESIUM SERPL-MCNC: 1.9 MG/DL (ref 1.5–2.5)
MCH RBC QN AUTO: 22.2 PG (ref 27–33)
MCHC RBC AUTO-ENTMCNC: 29.6 G/DL (ref 33.7–35.3)
MCV RBC AUTO: 74.8 FL (ref 81.4–97.8)
MONOCYTES # BLD AUTO: 1.35 K/UL (ref 0–0.85)
MONOCYTES NFR BLD AUTO: 8 % (ref 0–13.4)
NEUTROPHILS # BLD AUTO: 12.98 K/UL (ref 1.82–7.42)
NEUTROPHILS NFR BLD: 76.8 % (ref 44–72)
NRBC # BLD AUTO: 0 K/UL
NRBC BLD-RTO: 0 /100 WBC
PLATELET # BLD AUTO: 624 K/UL (ref 164–446)
PMV BLD AUTO: 8.2 FL (ref 9–12.9)
POTASSIUM SERPL-SCNC: 3.3 MMOL/L (ref 3.6–5.5)
PROT SERPL-MCNC: 7.2 G/DL (ref 6–8.2)
RBC # BLD AUTO: 3.25 M/UL (ref 4.7–6.1)
SODIUM SERPL-SCNC: 132 MMOL/L (ref 135–145)
TROPONIN T SERPL-MCNC: 24 NG/L (ref 6–19)
WBC # BLD AUTO: 16.9 K/UL (ref 4.8–10.8)

## 2022-08-07 PROCEDURE — C9113 INJ PANTOPRAZOLE SODIUM, VIA: HCPCS | Performed by: STUDENT IN AN ORGANIZED HEALTH CARE EDUCATION/TRAINING PROGRAM

## 2022-08-07 PROCEDURE — 83735 ASSAY OF MAGNESIUM: CPT

## 2022-08-07 PROCEDURE — 160048 HCHG OR STATISTICAL LEVEL 1-5: Performed by: INTERNAL MEDICINE

## 2022-08-07 PROCEDURE — 160002 HCHG RECOVERY MINUTES (STAT): Performed by: INTERNAL MEDICINE

## 2022-08-07 PROCEDURE — 160203 HCHG ENDO MINUTES - 1ST 30 MINS LEVEL 4: Performed by: INTERNAL MEDICINE

## 2022-08-07 PROCEDURE — 74177 CT ABD & PELVIS W/CONTRAST: CPT | Mod: MG

## 2022-08-07 PROCEDURE — 700101 HCHG RX REV CODE 250: Performed by: INTERNAL MEDICINE

## 2022-08-07 PROCEDURE — 88342 IMHCHEM/IMCYTCHM 1ST ANTB: CPT

## 2022-08-07 PROCEDURE — 71275 CT ANGIOGRAPHY CHEST: CPT | Mod: ME

## 2022-08-07 PROCEDURE — 93010 ELECTROCARDIOGRAM REPORT: CPT | Mod: 76 | Performed by: INTERNAL MEDICINE

## 2022-08-07 PROCEDURE — 700102 HCHG RX REV CODE 250 W/ 637 OVERRIDE(OP): Performed by: ANESTHESIOLOGY

## 2022-08-07 PROCEDURE — 71045 X-RAY EXAM CHEST 1 VIEW: CPT

## 2022-08-07 PROCEDURE — 85025 COMPLETE CBC W/AUTO DIFF WBC: CPT

## 2022-08-07 PROCEDURE — 700101 HCHG RX REV CODE 250: Performed by: ANESTHESIOLOGY

## 2022-08-07 PROCEDURE — 700117 HCHG RX CONTRAST REV CODE 255: Performed by: SURGERY

## 2022-08-07 PROCEDURE — 99291 CRITICAL CARE FIRST HOUR: CPT

## 2022-08-07 PROCEDURE — 700111 HCHG RX REV CODE 636 W/ 250 OVERRIDE (IP): Performed by: ANESTHESIOLOGY

## 2022-08-07 PROCEDURE — 00811 ANES LWR INTST NDSC NOS: CPT | Performed by: ANESTHESIOLOGY

## 2022-08-07 PROCEDURE — A9270 NON-COVERED ITEM OR SERVICE: HCPCS

## 2022-08-07 PROCEDURE — 82378 CARCINOEMBRYONIC ANTIGEN: CPT

## 2022-08-07 PROCEDURE — 700111 HCHG RX REV CODE 636 W/ 250 OVERRIDE (IP): Performed by: STUDENT IN AN ORGANIZED HEALTH CARE EDUCATION/TRAINING PROGRAM

## 2022-08-07 PROCEDURE — 160035 HCHG PACU - 1ST 60 MINS PHASE I: Performed by: INTERNAL MEDICINE

## 2022-08-07 PROCEDURE — A9270 NON-COVERED ITEM OR SERVICE: HCPCS | Performed by: ANESTHESIOLOGY

## 2022-08-07 PROCEDURE — 700102 HCHG RX REV CODE 250 W/ 637 OVERRIDE(OP): Performed by: STUDENT IN AN ORGANIZED HEALTH CARE EDUCATION/TRAINING PROGRAM

## 2022-08-07 PROCEDURE — 84484 ASSAY OF TROPONIN QUANT: CPT

## 2022-08-07 PROCEDURE — 700117 HCHG RX CONTRAST REV CODE 255

## 2022-08-07 PROCEDURE — 93010 ELECTROCARDIOGRAM REPORT: CPT | Performed by: INTERNAL MEDICINE

## 2022-08-07 PROCEDURE — 85379 FIBRIN DEGRADATION QUANT: CPT

## 2022-08-07 PROCEDURE — 88305 TISSUE EXAM BY PATHOLOGIST: CPT

## 2022-08-07 PROCEDURE — 36415 COLL VENOUS BLD VENIPUNCTURE: CPT

## 2022-08-07 PROCEDURE — 770020 HCHG ROOM/CARE - TELE (206)

## 2022-08-07 PROCEDURE — 700111 HCHG RX REV CODE 636 W/ 250 OVERRIDE (IP)

## 2022-08-07 PROCEDURE — 80053 COMPREHEN METABOLIC PANEL: CPT

## 2022-08-07 PROCEDURE — 160009 HCHG ANES TIME/MIN: Performed by: INTERNAL MEDICINE

## 2022-08-07 PROCEDURE — C1889 IMPLANT/INSERT DEVICE, NOC: HCPCS | Performed by: INTERNAL MEDICINE

## 2022-08-07 PROCEDURE — 0DBP8ZX EXCISION OF RECTUM, VIA NATURAL OR ARTIFICIAL OPENING ENDOSCOPIC, DIAGNOSTIC: ICD-10-PCS | Performed by: INTERNAL MEDICINE

## 2022-08-07 PROCEDURE — 700101 HCHG RX REV CODE 250: Performed by: HOSPITALIST

## 2022-08-07 PROCEDURE — 88341 IMHCHEM/IMCYTCHM EA ADD ANTB: CPT

## 2022-08-07 PROCEDURE — 700105 HCHG RX REV CODE 258: Performed by: ANESTHESIOLOGY

## 2022-08-07 PROCEDURE — 99100 ANES PT EXTEME AGE<1 YR&>70: CPT | Performed by: ANESTHESIOLOGY

## 2022-08-07 PROCEDURE — 700102 HCHG RX REV CODE 250 W/ 637 OVERRIDE(OP)

## 2022-08-07 PROCEDURE — A9270 NON-COVERED ITEM OR SERVICE: HCPCS | Performed by: STUDENT IN AN ORGANIZED HEALTH CARE EDUCATION/TRAINING PROGRAM

## 2022-08-07 PROCEDURE — 0DBH8ZX EXCISION OF CECUM, VIA NATURAL OR ARTIFICIAL OPENING ENDOSCOPIC, DIAGNOSTIC: ICD-10-PCS | Performed by: INTERNAL MEDICINE

## 2022-08-07 PROCEDURE — 93005 ELECTROCARDIOGRAM TRACING: CPT

## 2022-08-07 PROCEDURE — 160208 HCHG ENDO MINUTES - EA ADDL 1 MIN LEVEL 4: Performed by: INTERNAL MEDICINE

## 2022-08-07 RX ORDER — ADENOSINE 3 MG/ML
INJECTION, SOLUTION INTRAVENOUS
Status: ACTIVE
Start: 2022-08-07 | End: 2022-08-07

## 2022-08-07 RX ORDER — DILTIAZEM HYDROCHLORIDE 5 MG/ML
10 INJECTION INTRAVENOUS ONCE
Status: COMPLETED | OUTPATIENT
Start: 2022-08-07 | End: 2022-08-07

## 2022-08-07 RX ORDER — HYDROMORPHONE HYDROCHLORIDE 1 MG/ML
0.4 INJECTION, SOLUTION INTRAMUSCULAR; INTRAVENOUS; SUBCUTANEOUS
Status: DISCONTINUED | OUTPATIENT
Start: 2022-08-07 | End: 2022-08-07 | Stop reason: HOSPADM

## 2022-08-07 RX ORDER — CALCIUM CHLORIDE 100 MG/ML
INJECTION INTRAVENOUS; INTRAVENTRICULAR PRN
Status: DISCONTINUED | OUTPATIENT
Start: 2022-08-07 | End: 2022-08-07 | Stop reason: SURG

## 2022-08-07 RX ORDER — ADENOSINE 3 MG/ML
INJECTION, SOLUTION INTRAVENOUS
Status: COMPLETED | OUTPATIENT
Start: 2022-08-07 | End: 2022-08-07

## 2022-08-07 RX ORDER — SODIUM CHLORIDE, SODIUM LACTATE, POTASSIUM CHLORIDE, CALCIUM CHLORIDE 600; 310; 30; 20 MG/100ML; MG/100ML; MG/100ML; MG/100ML
INJECTION, SOLUTION INTRAVENOUS
Status: DISCONTINUED | OUTPATIENT
Start: 2022-08-07 | End: 2022-08-07 | Stop reason: SURG

## 2022-08-07 RX ORDER — POTASSIUM CHLORIDE 20 MEQ/1
40 TABLET, EXTENDED RELEASE ORAL ONCE
Status: COMPLETED | OUTPATIENT
Start: 2022-08-07 | End: 2022-08-07

## 2022-08-07 RX ORDER — OXYCODONE HYDROCHLORIDE 5 MG/1
5 TABLET ORAL
Status: DISCONTINUED | OUTPATIENT
Start: 2022-08-07 | End: 2022-08-08

## 2022-08-07 RX ORDER — LIDOCAINE HYDROCHLORIDE 20 MG/ML
INJECTION, SOLUTION EPIDURAL; INFILTRATION; INTRACAUDAL; PERINEURAL PRN
Status: DISCONTINUED | OUTPATIENT
Start: 2022-08-07 | End: 2022-08-07 | Stop reason: SURG

## 2022-08-07 RX ORDER — DIPHENHYDRAMINE HYDROCHLORIDE 50 MG/ML
12.5 INJECTION INTRAMUSCULAR; INTRAVENOUS
Status: DISCONTINUED | OUTPATIENT
Start: 2022-08-07 | End: 2022-08-07 | Stop reason: HOSPADM

## 2022-08-07 RX ORDER — MIDAZOLAM HYDROCHLORIDE 1 MG/ML
1 INJECTION INTRAMUSCULAR; INTRAVENOUS
Status: DISCONTINUED | OUTPATIENT
Start: 2022-08-07 | End: 2022-08-07 | Stop reason: HOSPADM

## 2022-08-07 RX ORDER — MAGNESIUM SULFATE HEPTAHYDRATE 40 MG/ML
2 INJECTION, SOLUTION INTRAVENOUS ONCE
Status: COMPLETED | OUTPATIENT
Start: 2022-08-07 | End: 2022-08-07

## 2022-08-07 RX ORDER — OXYCODONE HYDROCHLORIDE 10 MG/1
10 TABLET ORAL
Status: DISCONTINUED | OUTPATIENT
Start: 2022-08-07 | End: 2022-08-08

## 2022-08-07 RX ORDER — OXYCODONE HCL 5 MG/5 ML
10 SOLUTION, ORAL ORAL
Status: DISCONTINUED | OUTPATIENT
Start: 2022-08-07 | End: 2022-08-07 | Stop reason: HOSPADM

## 2022-08-07 RX ORDER — DILTIAZEM HYDROCHLORIDE 5 MG/ML
INJECTION INTRAVENOUS
Status: COMPLETED | OUTPATIENT
Start: 2022-08-07 | End: 2022-08-07

## 2022-08-07 RX ORDER — OXYCODONE HCL 5 MG/5 ML
5 SOLUTION, ORAL ORAL
Status: DISCONTINUED | OUTPATIENT
Start: 2022-08-07 | End: 2022-08-07 | Stop reason: HOSPADM

## 2022-08-07 RX ORDER — ENEMA 19; 7 G/133ML; G/133ML
1 ENEMA RECTAL ONCE
Status: COMPLETED | OUTPATIENT
Start: 2022-08-07 | End: 2022-08-07

## 2022-08-07 RX ORDER — HYDROMORPHONE HYDROCHLORIDE 1 MG/ML
0.2 INJECTION, SOLUTION INTRAMUSCULAR; INTRAVENOUS; SUBCUTANEOUS
Status: DISCONTINUED | OUTPATIENT
Start: 2022-08-07 | End: 2022-08-07 | Stop reason: HOSPADM

## 2022-08-07 RX ORDER — ONDANSETRON 2 MG/ML
4 INJECTION INTRAMUSCULAR; INTRAVENOUS
Status: DISCONTINUED | OUTPATIENT
Start: 2022-08-07 | End: 2022-08-07 | Stop reason: HOSPADM

## 2022-08-07 RX ORDER — PHENYLEPHRINE HYDROCHLORIDE 10 MG/ML
INJECTION, SOLUTION INTRAMUSCULAR; INTRAVENOUS; SUBCUTANEOUS PRN
Status: DISCONTINUED | OUTPATIENT
Start: 2022-08-07 | End: 2022-08-07 | Stop reason: SURG

## 2022-08-07 RX ORDER — HYDROMORPHONE HYDROCHLORIDE 1 MG/ML
0.5 INJECTION, SOLUTION INTRAMUSCULAR; INTRAVENOUS; SUBCUTANEOUS
Status: DISCONTINUED | OUTPATIENT
Start: 2022-08-07 | End: 2022-08-08

## 2022-08-07 RX ORDER — IPRATROPIUM BROMIDE AND ALBUTEROL SULFATE 2.5; .5 MG/3ML; MG/3ML
3 SOLUTION RESPIRATORY (INHALATION)
Status: DISCONTINUED | OUTPATIENT
Start: 2022-08-07 | End: 2022-08-07 | Stop reason: HOSPADM

## 2022-08-07 RX ORDER — HYDROMORPHONE HYDROCHLORIDE 1 MG/ML
1 INJECTION, SOLUTION INTRAMUSCULAR; INTRAVENOUS; SUBCUTANEOUS
Status: DISCONTINUED | OUTPATIENT
Start: 2022-08-07 | End: 2022-08-07 | Stop reason: HOSPADM

## 2022-08-07 RX ORDER — MEPERIDINE HYDROCHLORIDE 25 MG/ML
12.5 INJECTION INTRAMUSCULAR; INTRAVENOUS; SUBCUTANEOUS
Status: DISCONTINUED | OUTPATIENT
Start: 2022-08-07 | End: 2022-08-07 | Stop reason: HOSPADM

## 2022-08-07 RX ORDER — SODIUM CHLORIDE, SODIUM GLUCONATE, SODIUM ACETATE, POTASSIUM CHLORIDE AND MAGNESIUM CHLORIDE 526; 502; 368; 37; 30 MG/100ML; MG/100ML; MG/100ML; MG/100ML; MG/100ML
500 INJECTION, SOLUTION INTRAVENOUS CONTINUOUS
Status: DISCONTINUED | OUTPATIENT
Start: 2022-08-07 | End: 2022-08-07 | Stop reason: HOSPADM

## 2022-08-07 RX ORDER — HALOPERIDOL 5 MG/ML
1 INJECTION INTRAMUSCULAR
Status: DISCONTINUED | OUTPATIENT
Start: 2022-08-07 | End: 2022-08-07 | Stop reason: HOSPADM

## 2022-08-07 RX ADMIN — METOPROLOL TARTRATE 25 MG: 25 TABLET, FILM COATED ORAL at 18:12

## 2022-08-07 RX ADMIN — PROPOFOL 20 MG: 10 INJECTION, EMULSION INTRAVENOUS at 09:40

## 2022-08-07 RX ADMIN — DILTIAZEM HYDROCHLORIDE 10 MG: 5 INJECTION INTRAVENOUS at 00:22

## 2022-08-07 RX ADMIN — PROPOFOL 80 MG: 10 INJECTION, EMULSION INTRAVENOUS at 09:31

## 2022-08-07 RX ADMIN — MAGNESIUM SULFATE HEPTAHYDRATE 2 G: 40 INJECTION, SOLUTION INTRAVENOUS at 01:18

## 2022-08-07 RX ADMIN — PROPOFOL 20 MG: 10 INJECTION, EMULSION INTRAVENOUS at 09:35

## 2022-08-07 RX ADMIN — PANTOPRAZOLE SODIUM 40 MG: 40 INJECTION, POWDER, LYOPHILIZED, FOR SOLUTION INTRAVENOUS at 05:07

## 2022-08-07 RX ADMIN — POTASSIUM CHLORIDE 40 MEQ: 1500 TABLET, EXTENDED RELEASE ORAL at 01:21

## 2022-08-07 RX ADMIN — PHENYLEPHRINE HYDROCHLORIDE 100 MCG: 10 INJECTION INTRAVENOUS at 09:31

## 2022-08-07 RX ADMIN — SODIUM CHLORIDE, POTASSIUM CHLORIDE, SODIUM LACTATE AND CALCIUM CHLORIDE: 600; 310; 30; 20 INJECTION, SOLUTION INTRAVENOUS at 09:23

## 2022-08-07 RX ADMIN — LIDOCAINE HYDROCHLORIDE 40 MG: 20 INJECTION, SOLUTION EPIDURAL; INFILTRATION; INTRACAUDAL at 09:31

## 2022-08-07 RX ADMIN — DILTIAZEM HYDROCHLORIDE 30 MG: 30 TABLET, FILM COATED ORAL at 00:44

## 2022-08-07 RX ADMIN — OXYCODONE HYDROCHLORIDE 20 MG: 20 TABLET, FILM COATED, EXTENDED RELEASE ORAL at 18:12

## 2022-08-07 RX ADMIN — OXYCODONE HYDROCHLORIDE 10 MG: 5 SOLUTION ORAL at 11:05

## 2022-08-07 RX ADMIN — METRONIDAZOLE 500 MG: 5 INJECTION, SOLUTION INTRAVENOUS at 16:12

## 2022-08-07 RX ADMIN — EPHEDRINE SULFATE 10 MG: 50 INJECTION, SOLUTION INTRAVENOUS at 09:34

## 2022-08-07 RX ADMIN — METRONIDAZOLE 500 MG: 5 INJECTION, SOLUTION INTRAVENOUS at 05:08

## 2022-08-07 RX ADMIN — PHENYLEPHRINE HYDROCHLORIDE 100 MCG: 10 INJECTION INTRAVENOUS at 09:51

## 2022-08-07 RX ADMIN — OXYCODONE HYDROCHLORIDE 20 MG: 20 TABLET, FILM COATED, EXTENDED RELEASE ORAL at 05:08

## 2022-08-07 RX ADMIN — SODIUM PHOSPHATE 133 ML: 7; 19 ENEMA RECTAL at 08:20

## 2022-08-07 RX ADMIN — PROPOFOL 20 MG: 10 INJECTION, EMULSION INTRAVENOUS at 09:47

## 2022-08-07 RX ADMIN — METRONIDAZOLE 500 MG: 5 INJECTION, SOLUTION INTRAVENOUS at 21:54

## 2022-08-07 RX ADMIN — IOHEXOL 40 ML: 350 INJECTION, SOLUTION INTRAVENOUS at 02:05

## 2022-08-07 RX ADMIN — SENNOSIDES AND DOCUSATE SODIUM 2 TABLET: 50; 8.6 TABLET ORAL at 05:07

## 2022-08-07 RX ADMIN — METOPROLOL TARTRATE 25 MG: 25 TABLET, FILM COATED ORAL at 05:08

## 2022-08-07 RX ADMIN — PHENYLEPHRINE HYDROCHLORIDE 100 MCG: 10 INJECTION INTRAVENOUS at 09:34

## 2022-08-07 RX ADMIN — HYDROMORPHONE HYDROCHLORIDE 1 MG: 1 INJECTION, SOLUTION INTRAMUSCULAR; INTRAVENOUS; SUBCUTANEOUS at 03:35

## 2022-08-07 RX ADMIN — DILTIAZEM HYDROCHLORIDE 10 MG: 5 INJECTION INTRAVENOUS at 00:28

## 2022-08-07 RX ADMIN — POTASSIUM CHLORIDE 40 MEQ: 1500 TABLET, EXTENDED RELEASE ORAL at 05:07

## 2022-08-07 RX ADMIN — CEFTRIAXONE SODIUM 2 G: 10 INJECTION, POWDER, FOR SOLUTION INTRAVENOUS at 23:07

## 2022-08-07 RX ADMIN — IOHEXOL 96 ML: 350 INJECTION, SOLUTION INTRAVENOUS at 15:49

## 2022-08-07 RX ADMIN — CALCIUM CHLORIDE 500 MG: 100 INJECTION INTRAVENOUS; INTRAVENTRICULAR at 09:34

## 2022-08-07 RX ADMIN — CALCIUM CHLORIDE 500 MG: 100 INJECTION INTRAVENOUS; INTRAVENTRICULAR at 09:39

## 2022-08-07 RX ADMIN — ADENOSINE 6 MG: 3 INJECTION INTRAVENOUS at 00:24

## 2022-08-07 ASSESSMENT — ENCOUNTER SYMPTOMS
NECK PAIN: 0
COUGH: 0
SORE THROAT: 0
MYALGIAS: 0
ABDOMINAL PAIN: 1
PND: 0
CLAUDICATION: 0
WEAKNESS: 0
FEVER: 0
HEADACHES: 0
NAUSEA: 1
DEPRESSION: 0
DOUBLE VISION: 0
CHILLS: 0
HEMOPTYSIS: 0
PALPITATIONS: 0
FALLS: 0
WEIGHT LOSS: 1
DIAPHORESIS: 0
SHORTNESS OF BREATH: 0
DIARRHEA: 0
DIZZINESS: 0
CONSTIPATION: 0
PHOTOPHOBIA: 0
VOMITING: 0
BLURRED VISION: 0
TREMORS: 0
STRIDOR: 0
HALLUCINATIONS: 0
POLYDIPSIA: 0
BLOOD IN STOOL: 0
EYE PAIN: 0
FLANK PAIN: 0
BACK PAIN: 0
SPUTUM PRODUCTION: 0
BRUISES/BLEEDS EASILY: 0
WHEEZING: 0
TINGLING: 0
HEARTBURN: 0
ORTHOPNEA: 0
SINUS PAIN: 0

## 2022-08-07 ASSESSMENT — PAIN SCALES - PAIN ASSESSMENT IN ADVANCED DEMENTIA (PAINAD)
TOTALSCORE: 0
CONSOLABILITY: NO NEED TO CONSOLE
BODYLANGUAGE: RELAXED
FACIALEXPRESSION: SMILING OR INEXPRESSIVE
BREATHING: NORMAL

## 2022-08-07 ASSESSMENT — LIFESTYLE VARIABLES: SUBSTANCE_ABUSE: 0

## 2022-08-07 ASSESSMENT — COGNITIVE AND FUNCTIONAL STATUS - GENERAL
HELP NEEDED FOR BATHING: A LITTLE
STANDING UP FROM CHAIR USING ARMS: A LITTLE
DRESSING REGULAR UPPER BODY CLOTHING: A LITTLE
WALKING IN HOSPITAL ROOM: A LITTLE
DAILY ACTIVITIY SCORE: 20
SUGGESTED CMS G CODE MODIFIER DAILY ACTIVITY: CJ
MOVING FROM LYING ON BACK TO SITTING ON SIDE OF FLAT BED: A LITTLE
MOVING TO AND FROM BED TO CHAIR: A LITTLE
TOILETING: A LITTLE
DRESSING REGULAR LOWER BODY CLOTHING: A LITTLE
CLIMB 3 TO 5 STEPS WITH RAILING: A LOT
SUGGESTED CMS G CODE MODIFIER MOBILITY: CK
MOBILITY SCORE: 18

## 2022-08-07 ASSESSMENT — PAIN SCALES - GENERAL: PAIN_LEVEL: 0

## 2022-08-07 NOTE — PROGRESS NOTES
Rapid Response Summary     Rapid response called at 2357 for: Tachycardia     VS: Tachycardia  (See Vitals Flowsheet)  Additional info: sVT in the 190s  MD Paged: Nikolay Murray  Interventions:    Imaging/Tests: Chest X-ray and EKG    Labs: CBC, CMP, Troponin and D Dimer   Medications: Cardizem, Atropine   Other: PIV started   Disposition: Improved with rapid response team interventions. Primary RN updated on plan of care. Rapid team will continue to follow the patient.

## 2022-08-07 NOTE — PROGRESS NOTES
NOC HOSPITALIST CROSS COVER    RAPID RESPONSE called for SVT rates 180-190s at 2357. Arrived at bedside approximately 4 minutes later. Patient was found to be in . He was connected to the zoll monitor and AP pads were placed. He denied chest pain, chest pressure, shortness of breath, dizziness, or nausea/vomiting. The patient appeared calm, pleasant, and was joking with the RNs at bedside. He did not appear to be in any acute distress with this rhythm. Per nursing report, the patient was up to the commode and had been trying to have a bowel movement when a monitor check was called for SVT. Unfortunately, the patient had been pulling at his IVs, which were all dislodged. His initial blood pressure upon my arrival was 135/70. Lung sounds were clear to auscultation. Tachycardic, regular rhythm, no audible murmurs, rubs, or clicks. 12 lead EKG was obtained which demonstrated SVT rate 184 with ST depressions throughout. The RR team was able to obtain an ultrasound guided IV. Collaborating MD, Dr Murray, was notified at 0009 and arrived at bedside at 0015. Chart review revealed that the patient was in SVT in the ED and was given Cardizem with success. The decision was made to start with 10 mg Cardizem IV with minimal response. 6 mg of IV adenosine was given at 0024 which reduced the heart rate to 100-110. The patient's blood pressure was 109/65 following adenosine administration. An additional 10 mg of Cardizem was given intravenously for further rate control. Lab orders were placed for CBC, CMP, dimer, and troponin. The patient was given a PO dose of Cardizem 30 mg, 2 g IV magnesium, and 40 mg PO potassium. The patient remained asymptomatic through the extent of the rapid response. The rapid was ended at 0045 under the agreement of the rapid response team and the bedside nurse. The patient's vital signs were stable at the time that the rapid was discontinued.       Vitals:    08/07/22 0045   BP: 121/77   Pulse:  (!) 105   Resp: 17   Temp:    SpO2: 98%      Physical Exam  Constitutional:       General: He is not in acute distress.     Appearance: He is not ill-appearing.   HENT:      Head: Normocephalic.      Nose: Nose normal.      Mouth/Throat:      Mouth: Mucous membranes are dry.   Eyes:      Pupils: Pupils are equal, round, and reactive to light.   Cardiovascular:      Rate and Rhythm: Regular rhythm. Tachycardia present.      Pulses: Normal pulses.      Heart sounds: Normal heart sounds. No murmur heard.  Pulmonary:      Effort: Pulmonary effort is normal. No respiratory distress.      Breath sounds: Normal breath sounds.   Abdominal:      General: Bowel sounds are normal.      Palpations: Abdomen is soft.      Tenderness: There is no abdominal tenderness.   Musculoskeletal:         General: No swelling.      Right lower leg: No edema.      Left lower leg: No edema.   Skin:     General: Skin is dry.      Capillary Refill: Capillary refill takes less than 2 seconds.   Neurological:      General: No focal deficit present.      Mental Status: He is alert and oriented to person, place, and time.   Psychiatric:         Mood and Affect: Mood normal.         Behavior: Behavior normal.         Plan:  #SVT  -Echocardiogram ordered and pending  -Metoprolol was recommended per Dr Raines's note from his consultation earlier in the day, will discontinue Cardizem and order metoprolol per his recommendations  -Positive d-dimer of 2.13, will obtain CT-PE to rule out pulmonary embolism  -Repeat 12 lead EKG demonstrating sinus tachycardia rate 103, without ST elevations or depressions    Discussed case at bedside with Dr Murray. He was also updated with laboratory findings as they resulted and agrees with plan of care.     Patient is critically ill.   The patient continues to have: tachycardia  The vital organ system that is affected is the: cardiac  If untreated there is a high chance of deterioration into: cardiac arrest  And eventually  death.   The critical care that I am providing today is: Adenosine  The critical that has been undertaken is medically complex.   There has been no overlap in critical care time.   Critical Care Time not including procedures: 35          -----------------------------------------------------------------------------------------------------------    Electronically signed by:  REYNALDO Lal, [facility]  Hospitalist Services

## 2022-08-07 NOTE — CONSULTS
COLON AND RECTAL SURGERY CONSULT NOTE    PATIENT ID  Name:             Everett Peters     YOB: 1950  Age:                 72 y.o.  male   MRN:               [id number]    Consult requested by:  Dr. ROSIE Monaco    CHIEF COMPLAINT:        Right colon neoplasm     HISTORY OF PRESENT ILLNESS:  72-year-old male who presented to hospital yesterday with weight loss, fatigue, anorexia, nausea vomiting, abdominal pain and occasional melena.  CT at time of presentation demonstrated a large, nonobstructing cecal mass with lymphadenopathy.  Patient was admitted to hospital and underwent colonoscopy this morning, for which I was present for observation and the patient was noted to have a large cecal mass in keeping with malignancy that was biopsied and tattooed as well as a mid rectal mass that was biopsied.  Patient was also presenting with SVT and was seen by cardiology.    REVIEW OF SYSTEMS:   Constitutional: Denies unintended weight change, night sweats, fatigue  Eyes:   Denies eye pain, redness, discharge, vision changes  Ears/Nose/Throat/Mouth: Denies hearing changes, ear pain, nasal congestion, sore throat, dysphagia  Cardiovascular:  Denies Chest pain, shortness of breath, orthopnea, palpitations, claudication  Respiratory:  Denies cough, sputum, wheezing, dyspnea  Gastrointestinal/Hepatic:  Denies dysphagia, melena, jaundice, hematochezia, changing heartburn  Genitourinary:  Denies dysuria, increased frequency, hematuria, urgency  Musculoskeletal/Rheum: Denies changing arthralgias, myalgias, joint swelling, joint stiffness  Skin/Breast: Denies changing skin lesions, pruritis, nipple discharge, hair changes  Neurological: Denies weakness, numbness, paresthesia, syncope, dizziness  Pyschiatric: Denies acute depression, anxiety, insomnia, personality changes, delusions  Endocrine: Denies temperature intolerance, polydipsia, polyuria  Heme/Oncology/Lymph Nodes: Denies easy bruising, bleeding,  lymphadenopathy  All other systems were reviewed and are negative                 Past Medical History:   Hypertension  Hyperlipidemia  Depression  Anxiety  Gout  Chronic back pain    Past Surgical History:  No past surgical history on file.    Current Outpatient Medications:  No current facility-administered medications on file prior to encounter.     Current Outpatient Medications on File Prior to Encounter   Medication Sig Dispense Refill   • allopurinol (ZYLOPRIM) 300 MG Tab Take 300 mg by mouth 2 times a day.     • busPIRone (BUSPAR) 10 MG Tab tablet Take 2.5 mg by mouth every day. 2.5 mg = 1/4 tablet     • chlorthalidone (HYGROTON) 25 MG Tab Take 25 mg by mouth every day.     • omeprazole (PRILOSEC) 20 MG delayed-release capsule Take 20 mg by mouth every day.     • oxyCODONE-acetaminophen (PERCOCET) 7.5-325 MG per tablet Take 1 Tablet by mouth every 6 hours as needed.         Current Inpatient Medications:   Current Facility-Administered Medications   Medication Last Admin   • ADENOSINE 6 MG/2ML IV SOLN     • metoprolol tartrate (LOPRESSOR) tablet 25 mg 25 mg at 08/07/22 0508   • pantoprazole (Protonix) injection 40 mg 40 mg at 08/07/22 0507   • sodium bicarbonate tablet 650 mg     • HYDROmorphone (Dilaudid) injection 1 mg 1 mg at 08/07/22 0335   • oxyCODONE CR (OXYCONTIN) tablet 20 mg 20 mg at 08/07/22 0508   • metroNIDAZOLE (Flagyl) IVPB 500 mg Stopped at 08/07/22 0608   • senna-docusate (PERICOLACE or SENOKOT S) 8.6-50 MG per tablet 2 Tablet 2 Tablet at 08/07/22 0507    And   • polyethylene glycol/lytes (MIRALAX) PACKET 1 Packet      And   • magnesium hydroxide (MILK OF MAGNESIA) suspension 30 mL      And   • bisacodyl (DULCOLAX) suppository 10 mg     • acetaminophen (Tylenol) tablet 650 mg     • cefTRIAXone (Rocephin) syringe 2 g 2 g at 08/06/22 4982   • ondansetron (ZOFRAN) syringe/vial injection 4 mg     • ondansetron (ZOFRAN ODT) dispertab 4 mg     • labetalol (NORMODYNE/TRANDATE) injection 10 mg          Medication Allergy/Sensitivities:  Allergies   Allergen Reactions   • Aspirin Nausea and Unspecified     Stomach upset (pain)       Family History:  No family history on file.    Social History:  PCP: No primary care provider on file.  Social History     Tobacco Use   Smoking Status Not on file   Smokeless Tobacco Not on file     Social History     Substance and Sexual Activity   Alcohol Use None     Social History     Substance and Sexual Activity   Drug Use Not on file       PHYSICAL EXAM:  Weight/BMI: Body mass index is 25.13 kg/m².  Vitals:    08/07/22 0957 08/07/22 1000 08/07/22 1015 08/07/22 1030   BP: (!) 94/56 (!) 96/49 106/58 108/55   Pulse: 87 90 94 85   Resp: 20 20 20 18   Temp: 36.8 °C (98.2 °F)   36.4 °C (97.5 °F)   TempSrc: Temporal   Temporal   SpO2: 100% 99% 98% 97%   Weight:       Height:         Oxygen Therapy:  Pulse Oximetry: 97 %, O2 (LPM): 2, O2 Delivery Device: Nasal Cannula    Constitutional: Well developed, Well nourished, No acute distress, Non-toxic appearance.    HENMT: Normocephalic, Atraumatic, Bilateral external ears normal, Oropharynx moist mucous membranes, No oral exudates, Nose normal.  No thyromegaly.   Eyes: PERRLA, EOMI, Conjunctiva normal, No discharge.   Neck: Normal range of motion, No cervical tenderness, Supple, No stridor, no JVD.  Cardiovascular: Normal heart rate, Normal rhythm, No murmurs, No rubs, No gallops.   Extremites with intact distal pulses, no cyanosis, clubbing or edema.   Lungs:  Respiratory effort is normal. Normal breath sounds, breath sounds clear to auscultation bilaterally,  no rales, no rhonchi, no wheezing.   Abdomen: Bowel sounds normal, Soft, No tenderness, No guarding, No rebound, No masses, No hepatosplenomegaly.  Skin: Warm, Dry, No erythema, No rash, no induration or crepitus.        Neurologic: Alert & oriented x 3, Normal motor function, Normal sensory function, No focal deficits noted, cranial nerves II through XII are  normal.  Psychiatric: Affect normal, Judgment normal, Mood normal.     LAB DATA REVIEWED:  Recent Results (from the past 24 hour(s))   LACTIC ACID    Collection Time: 22  1:40 PM   Result Value Ref Range    Lactic Acid 2.4 (H) 0.5 - 2.0 mmol/L   TROPONIN    Collection Time: 22  1:40 PM   Result Value Ref Range    Troponin T 18 6 - 19 ng/L   TROPONIN    Collection Time: 22  5:04 PM   Result Value Ref Range    Troponin T 27 (H) 6 - 19 ng/L   EKG    Collection Time: 22 12:05 AM   Result Value Ref Range    Report       [facility]    Test Date:  2022  Pt Name:    LINN DENNIS               Department: 171  MRN:        [id number]                      Room:       [facility]  Gender:     Male                         Technician: JULIOCESAR  :        1950                   Requested By:UNA HERMAN  Order #:    [id number]                    Reading MD: Kenny Sheriff MD    Measurements  Intervals                                Axis  Rate:       184                          P:          0  WV:                                      QRS:        2  QRSD:       82                           T:          226  QT:         224  QTc:        392    Interpretive Statements  SUPRAVENTRICULAR TACHYCARDIA  REPOLARIZATION ABNORMALITY, PROB RATE RELATED  Compared to ECG 2022 21:04:45  Early repolarization now present  ST (T wave) deviation no longer present  Q waves no longer present  Ventricular premature complex(es) no longer present  T-wave abnormality no longer present  Electronically Signed On  9:48:55 PDT by Kenny Sheriff MD     TROPONIN    Collection Time: 22 12:26 AM   Result Value Ref Range    Troponin T 24 (H) 6 - 19 ng/L   Comp Metabolic Panel    Collection Time: 22 12:26 AM   Result Value Ref Range    Sodium 132 (L) 135 - 145 mmol/L    Potassium 3.3 (L) 3.6 - 5.5 mmol/L    Chloride 94 (L) 96 - 112 mmol/L    Co2 22 20 - 33 mmol/L    Anion Gap 16.0 7.0 - 16.0     Glucose 102 (H) 65 - 99 mg/dL    Bun 16 8 - 22 mg/dL    Creatinine 0.77 0.50 - 1.40 mg/dL    Calcium 8.8 8.5 - 10.5 mg/dL    AST(SGOT) 15 12 - 45 U/L    ALT(SGPT) 9 2 - 50 U/L    Alkaline Phosphatase 186 (H) 30 - 99 U/L    Total Bilirubin 0.6 0.1 - 1.5 mg/dL    Albumin 3.2 3.2 - 4.9 g/dL    Total Protein 7.2 6.0 - 8.2 g/dL    Globulin 4.0 (H) 1.9 - 3.5 g/dL    A-G Ratio 0.8 g/dL   D-DIMER    Collection Time: 08/07/22 12:26 AM   Result Value Ref Range    D-Dimer Screen 2.13 (H) 0.00 - 0.50 ug/mL (FEU)   CBC WITH DIFFERENTIAL    Collection Time: 08/07/22 12:26 AM   Result Value Ref Range    WBC 16.9 (H) 4.8 - 10.8 K/uL    RBC 3.25 (L) 4.70 - 6.10 M/uL    Hemoglobin 7.2 (L) 14.0 - 18.0 g/dL    Hematocrit 24.3 (L) 42.0 - 52.0 %    MCV 74.8 (L) 81.4 - 97.8 fL    MCH 22.2 (L) 27.0 - 33.0 pg    MCHC 29.6 (L) 33.7 - 35.3 g/dL    RDW 50.4 (H) 35.9 - 50.0 fL    Platelet Count 624 (H) 164 - 446 K/uL    MPV 8.2 (L) 9.0 - 12.9 fL    Neutrophils-Polys 76.80 (H) 44.00 - 72.00 %    Lymphocytes 14.30 (L) 22.00 - 41.00 %    Monocytes 8.00 0.00 - 13.40 %    Eosinophils 0.10 0.00 - 6.90 %    Basophils 0.20 0.00 - 1.80 %    Immature Granulocytes 0.60 0.00 - 0.90 %    Nucleated RBC 0.00 /100 WBC    Neutrophils (Absolute) 12.98 (H) 1.82 - 7.42 K/uL    Lymphs (Absolute) 2.42 1.00 - 4.80 K/uL    Monos (Absolute) 1.35 (H) 0.00 - 0.85 K/uL    Eos (Absolute) 0.02 0.00 - 0.51 K/uL    Baso (Absolute) 0.04 0.00 - 0.12 K/uL    Immature Granulocytes (abs) 0.10 0.00 - 0.11 K/uL    NRBC (Absolute) 0.00 K/uL   ESTIMATED GFR    Collection Time: 08/07/22 12:26 AM   Result Value Ref Range    GFR (CKD-EPI) 95 >60 mL/min/1.73 m 2   MAGNESIUM    Collection Time: 08/07/22 12:26 AM   Result Value Ref Range    Magnesium 1.9 1.5 - 2.5 mg/dL   EKG    Collection Time: 08/07/22  1:10 AM   Result Value Ref Range    Report       [facility]    Test Date:  2022-08-07  Pt Name:    LINN DENNIS               Department: 171  MRN:        [id number]                       Room:       T OR POOL  Gender:     Male                         Technician: JULIOCESAR  :        1950                   Requested By:GI VINCENT HALLMAN  Order #:    [id number]                    Reading MD: Kenny Sheriff MD    Measurements  Intervals                                Axis  Rate:       103                          P:          61  ME:         148                          QRS:        -4  QRSD:       96                           T:          21  QT:         352  QTc:        461    Interpretive Statements  SINUS TACHYCARDIA  Electronically Signed On 2022 9:48:58 PDT by Kenny Sheriff MD  Compared to ECG 2022 00:05:16  Supraventricular tachycardia no longer present  Early repolarization no longer present         IMAGING:   Images Independently Reviewed   CT-CTA CHEST PULMONARY ARTERY W/ RECONS   Final Result         1. No CT evidence of pulmonary embolism.   2. Patchy right lower lung opacities, likely atelectasis.   3. No pleural effusion or pneumothorax.         DX-CHEST-PORTABLE (1 VIEW)   Final Result         1. No acute cardiopulmonary abnormalities are identified.      CT-CHEST,ABDOMEN,PELVIS W/O   Final Result      Limited exam due to lack of oral or IV contrast.      1. Large mass involving the cecum and ascending colon, in keeping with primary colon cancer.         2.  Multiple enlarged right lower quadrant mesenteric lymph nodes, likely metastasis. Several mildly prominent retroperitoneal lymph nodes, indeterminate.      3. Nonobstructive left renal calculus.      4. Dilated CBD and intrahepatic bile duct could relate to post cholecystectomy status. Correlate with LFTs.      DX-CHEST-PORTABLE (1 VIEW)   Final Result         1. No acute cardiopulmonary abnormalities are identified.      EC-ECHOCARDIOGRAM COMPLETE W/O CONT    (Results Pending)       ASSESSMENT/PLAN     72-year-old male with new diagnosis of cecal mass in keeping with colon cancer without obstruction or significant  bleeding.  On colonoscopy he was found to have a large cecal mass and the ileocecal valve was unable to be identified.  Biopsies were taken and the mass appeared to extend up towards the hepatic flexure and was tattooed distally.  A second mass was found in the rectum at approximately 10 cm from the anal verge that was biopsied.    The patient underwent a noncontrast CT chest abdomen and pelvis on August 5.  The patient had a CT for PE protocol today that did not demonstrate any evidence of metastatic disease within the chest.  A CT scan of the abdomen and pelvis with IV contrast will be performed for staging.  CEA will also be ordered.  The patient may also require a rectal cancer protocol MRI on the magno 3.0 as an outpatient for further staging of the rectal mass.    The patient will require surgical management for a right colectomy for the cecal mass and will require either a transanal minimally invasive surgery or a low anterior resection for the rectal mass.  We will await biopsies and completion of staging work-up prior to creating a plan for surgery. Surgery will be planned electively as an outpatient once the above information is available.    Cardiology commented that SVT would not increased perioperative risk.    Consultation took greater than 60 minutes for review of investigations, patient assessment at bedside, creation of plan.    Yamila Bennett MD  General Surgery  Colon and Rectal Surgery  [facility]

## 2022-08-07 NOTE — ANESTHESIA TIME REPORT
Anesthesia Start and Stop Event Times     Date Time Event    8/7/2022 0904 Ready for Procedure     0923 Anesthesia Start     1001 Anesthesia Stop        Responsible Staff  08/07/22    Name Role Begin End    Neville Weinberg III, M.D. Anesth 0923 1001        Overtime Reason:  no overtime (within assigned shift)    Comments:

## 2022-08-07 NOTE — ANESTHESIA POSTPROCEDURE EVALUATION
Patient: Everett Peters    Procedure Summary     Date: 08/07/22 Room / Location: [facility] OR 06 / SURGERY [facility]    Anesthesia Start: 0923 Anesthesia Stop: 1001    Procedures:       COLONOSCOPY (N/A Anus)      COLONOSCOPY, WITH BIOPSY (N/A Anus)      TATTOOING, WITH COLONOSCOPY (N/A Anus) Diagnosis: (cecal mass, rectal mass)    Surgeons: Ruddy Jansen M.D. Responsible Provider: Neville Weinberg III, M.D.    Anesthesia Type: general ASA Status: 3          Final Anesthesia Type: general  Last vitals  BP   Blood Pressure : 108/55    Temp   36.4 °C (97.5 °F)    Pulse   85   Resp   18    SpO2   97 %      Anesthesia Post Evaluation    Patient location during evaluation: PACU  Patient participation: complete - patient participated  Level of consciousness: awake and alert  Pain score: 0    Airway patency: patent  Anesthetic complications: no  Cardiovascular status: hemodynamically stable  Respiratory status: acceptable  Hydration status: euvolemic    PONV: none          No complications documented.     Nurse Pain Score: 0 (NPRS)

## 2022-08-07 NOTE — CARE PLAN
The patient is Stable - Low risk of patient condition declining or worsening    Shift Goals  Clinical Goals: Safety, bowel prep  Patient Goals: Sleep  Family Goals: marsha    Progress made toward(s) clinical / shift goals:    Problem: Pain - Standard  Goal: Alleviation of pain or a reduction in pain to the patient’s comfort goal  Outcome: Progressing     Problem: Skin Integrity  Goal: Skin integrity is maintained or improved  Outcome: Progressing     Problem: Fall Risk  Goal: Patient will remain free from falls  Outcome: Progressing       Patient is not progressing towards the following goals:

## 2022-08-07 NOTE — OR NURSING
Patient awake and alert and tolerating clear liquids without issue. VSS. Pt denies pain and nausea at this time. Pt resting in bed and has been updated on plan of care. Pt has no questions at this time.     Pt wife called and updated on plan of care.     Report given to Angela BAIRD. Pt transported to room in stable condition with monitor and ACLS RN.

## 2022-08-07 NOTE — OR SURGEON
Post OP Note    PreOp Diagnosis: abnormal CT scan    Weight loss      PostOp Diagnosis:    1/cecal ascending ulcerated mass circumferential approximately 10 to 12 cm in length biopsied tattoos were placed at the distal portion.   2/rectal mass an approximate 10 cm, 4 cm in size biopsied   3/ Lipoma 5 cm in the sigmoid colon   4/ diverticulosis in the sigmoid colon   Otherwise normal colonoscopy      Procedure(s):  COLONOSCOPY - Wound Class: Clean Contaminated  COLONOSCOPY, WITH BIOPSY - Wound Class: Clean Contaminated  TATTOOING, WITH COLONOSCOPY - Wound Class: Clean Contaminated    Surgeon(s):  Ruddy Jansen M.D.    Anesthesiologist/Type of Anesthesia:  Anesthesiologist: Neville Weinberg III, M.D./MAC    Surgical Staff:  Endoscopy Technician: Clementina Samuel; Aide Souza  Endoscopy Nurse: Stephanie Escobar R.N.    Specimens removed if any:  ID Type Source Tests Collected by Time Destination   A : cecal mass bx Tissue Cecum PATHOLOGY SPECIMEN Ruddy Jansen M.D. 8/7/2022  9:46 AM    B : rectal mass bx Tissue Rectal PATHOLOGY SPECIMEN Ruddy Jansen M.D. 8/7/2022  9:50 AM        Estimated Blood Loss: none  Findings:     Prior to the procedure the patient was informed the risks and benefits of the procedure and freely signed the consent form.  He was monitored under standard monitoring blood pressure oxygen heart monitor no appreciable abnormality noted during the procedure.  He was placed in left lateral cubitus position digital rectal exam was nontender no internal or external hemorrhoids were appreciated.  Using the standards Olympus variable stiffness colonoscope introduced under manual insertion passed the cecum identified by a large cecal mass.  Ulcerated circumferential nonobstructing the ileocecal valve could not be identified.  The mass itself is approximately 10 to 12 cm in length involving the ascending colon.  Biopsies were taken.  3 to 4 cc of Kristan ink were used to tattoo the distal portion  of the mass.  Transverse colon appeared normal as did the descending colon.  In the sigmoid colon there is a 5 cm lipoma.  Diverticulosis also appreciated within the sigmoid colon.  In the rectal rectosigmoid region approximately 10 cm there is a 4 cm soft mass that was biopsied.  Retroflexion was performed in the rectum about the rectum and verge colonoscope was then gradually straightened excess air was removed patient tolerated the procedure well.    Complications: none    Recommendations   Surgery consultation due to   Discussed with /Donald   Clear liquid diet   Continue supportive care   Await pathology findings   Call if any questions   Follow up with [facility] after surgery 3-5 months     Please call [phone] if any questions.        8/7/2022 9:56 AM Ruddy Jansen M.D.

## 2022-08-07 NOTE — ANESTHESIA PREPROCEDURE EVALUATION
Case: [id number] Date/Time: 08/07/22 0845    Procedure: COLONOSCOPY (N/A Anus)    Anesthesia type: MAC    Pre-op diagnosis: abormal CT, weight loss, anemia    [facility]    Surgeons: Ruddy Jansen M.D.          Relevant Problems   CARDIAC   (positive) SVT (supraventricular tachycardia) (HCC)         (positive) MICHAEL (acute kidney injury) (HCC)       Physical Exam    Airway   Mallampati: II  TM distance: >3 FB  Neck ROM: full       Cardiovascular - normal exam  Rhythm: regular  Rate: normal  (-) murmur     Dental - normal exam           Pulmonary - normal exam  Breath sounds clear to auscultation     Abdominal    Neurological - normal exam         Other findings: Severe anemia, GI bleed, cecal mass, near syncope            Anesthesia Plan    ASA 3       Plan - general       Airway plan will be natural airway          Induction: intravenous    Postoperative Plan: Postoperative administration of opioids is intended.    Pertinent diagnostic labs and testing reviewed    Informed Consent:    Anesthetic plan and risks discussed with patient.    Use of blood products discussed with: patient whom consented to blood products.

## 2022-08-07 NOTE — PROGRESS NOTES
Radiologist called to let me know that there was a tiny bubble of gas posterior to the cecum.  I reviewed the images personally as well.  Likely from biopsies or tattoo.    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  NPO. On Cetriaxone/Flagyl already and will continue. Follow bloodwork and exam.  Currently stable.    Conveyed the finding to the patient's hospitalist as well.

## 2022-08-07 NOTE — PROGRESS NOTES
Report received at bedside, pt care assumed, tele box on. Pt aaox4, no signs of distress noted at this time. Patient resting comfortably in bed. POC discussed with pt and verbalizes no questions. Pt c/o of 7/10 pain. Pt denies any additional needs at this time. Bed in lowest position, pt educated on fall risk and verbalized understanding, call light within reach, bed alarm plugged in and on.

## 2022-08-08 ENCOUNTER — APPOINTMENT (OUTPATIENT)
Dept: CARDIOLOGY | Facility: MEDICAL CENTER | Age: 72
DRG: 872 | End: 2022-08-08
Attending: HOSPITALIST
Payer: COMMERCIAL

## 2022-08-08 LAB
LV EJECT FRACT  99904: 65
LV EJECT FRACT MOD 2C 99903: 58.98
LV EJECT FRACT MOD 4C 99902: 65.22
LV EJECT FRACT MOD BP 99901: 63.4
PATHOLOGY CONSULT NOTE: NORMAL

## 2022-08-08 PROCEDURE — 700101 HCHG RX REV CODE 250: Performed by: HOSPITALIST

## 2022-08-08 PROCEDURE — 93306 TTE W/DOPPLER COMPLETE: CPT

## 2022-08-08 PROCEDURE — A9270 NON-COVERED ITEM OR SERVICE: HCPCS | Performed by: STUDENT IN AN ORGANIZED HEALTH CARE EDUCATION/TRAINING PROGRAM

## 2022-08-08 PROCEDURE — 700102 HCHG RX REV CODE 250 W/ 637 OVERRIDE(OP)

## 2022-08-08 PROCEDURE — A9270 NON-COVERED ITEM OR SERVICE: HCPCS

## 2022-08-08 PROCEDURE — 770020 HCHG ROOM/CARE - TELE (206)

## 2022-08-08 PROCEDURE — 700102 HCHG RX REV CODE 250 W/ 637 OVERRIDE(OP): Performed by: STUDENT IN AN ORGANIZED HEALTH CARE EDUCATION/TRAINING PROGRAM

## 2022-08-08 PROCEDURE — 700111 HCHG RX REV CODE 636 W/ 250 OVERRIDE (IP): Performed by: STUDENT IN AN ORGANIZED HEALTH CARE EDUCATION/TRAINING PROGRAM

## 2022-08-08 PROCEDURE — 99233 SBSQ HOSP IP/OBS HIGH 50: CPT | Performed by: STUDENT IN AN ORGANIZED HEALTH CARE EDUCATION/TRAINING PROGRAM

## 2022-08-08 PROCEDURE — 93306 TTE W/DOPPLER COMPLETE: CPT | Mod: 26 | Performed by: INTERNAL MEDICINE

## 2022-08-08 RX ORDER — HYDROMORPHONE HYDROCHLORIDE 1 MG/ML
0.5 INJECTION, SOLUTION INTRAMUSCULAR; INTRAVENOUS; SUBCUTANEOUS
Status: DISCONTINUED | OUTPATIENT
Start: 2022-08-08 | End: 2022-08-11

## 2022-08-08 RX ORDER — METRONIDAZOLE 500 MG/1
500 TABLET ORAL EVERY 8 HOURS
Status: COMPLETED | OUTPATIENT
Start: 2022-08-08 | End: 2022-08-13

## 2022-08-08 RX ORDER — CEFUROXIME AXETIL 500 MG/1
500 TABLET ORAL EVERY 12 HOURS
Status: COMPLETED | OUTPATIENT
Start: 2022-08-09 | End: 2022-08-13

## 2022-08-08 RX ORDER — OXYCODONE HYDROCHLORIDE 10 MG/1
10 TABLET ORAL
Status: DISCONTINUED | OUTPATIENT
Start: 2022-08-08 | End: 2022-08-12

## 2022-08-08 RX ORDER — SODIUM CHLORIDE 9 MG/ML
250 INJECTION, SOLUTION INTRAVENOUS ONCE
Status: DISCONTINUED | OUTPATIENT
Start: 2022-08-08 | End: 2022-08-08

## 2022-08-08 RX ADMIN — METOPROLOL TARTRATE 25 MG: 25 TABLET, FILM COATED ORAL at 17:10

## 2022-08-08 RX ADMIN — OXYCODONE HYDROCHLORIDE 10 MG: 10 TABLET ORAL at 01:01

## 2022-08-08 RX ADMIN — METRONIDAZOLE 500 MG: 5 INJECTION, SOLUTION INTRAVENOUS at 05:18

## 2022-08-08 RX ADMIN — HYDROMORPHONE HYDROCHLORIDE 0.5 MG: 1 INJECTION, SOLUTION INTRAMUSCULAR; INTRAVENOUS; SUBCUTANEOUS at 23:34

## 2022-08-08 RX ADMIN — METRONIDAZOLE 500 MG: 500 TABLET ORAL at 21:25

## 2022-08-08 RX ADMIN — ACETAMINOPHEN 650 MG: 325 TABLET, FILM COATED ORAL at 01:01

## 2022-08-08 RX ADMIN — OXYCODONE HYDROCHLORIDE 10 MG: 10 TABLET ORAL at 21:27

## 2022-08-08 RX ADMIN — METOPROLOL TARTRATE 25 MG: 25 TABLET, FILM COATED ORAL at 05:16

## 2022-08-08 RX ADMIN — METRONIDAZOLE 500 MG: 500 TABLET ORAL at 14:09

## 2022-08-08 RX ADMIN — OXYCODONE HYDROCHLORIDE 20 MG: 20 TABLET, FILM COATED, EXTENDED RELEASE ORAL at 05:16

## 2022-08-08 RX ADMIN — OXYCODONE HYDROCHLORIDE 20 MG: 20 TABLET, FILM COATED, EXTENDED RELEASE ORAL at 17:10

## 2022-08-08 RX ADMIN — OXYCODONE HYDROCHLORIDE 10 MG: 10 TABLET ORAL at 14:16

## 2022-08-08 ASSESSMENT — LIFESTYLE VARIABLES: SUBSTANCE_ABUSE: 0

## 2022-08-08 ASSESSMENT — ENCOUNTER SYMPTOMS
BLURRED VISION: 0
DOUBLE VISION: 0
VOMITING: 0
WHEEZING: 0
EYE PAIN: 0
WEIGHT LOSS: 1
SORE THROAT: 0
PALPITATIONS: 0
TREMORS: 0
MYALGIAS: 0
NECK PAIN: 0
POLYDIPSIA: 0
CLAUDICATION: 0
WEAKNESS: 0
PHOTOPHOBIA: 0
ABDOMINAL PAIN: 1
BACK PAIN: 0
HEMOPTYSIS: 0
BRUISES/BLEEDS EASILY: 0
HEARTBURN: 0
SINUS PAIN: 0
PND: 0
DIAPHORESIS: 0
SPUTUM PRODUCTION: 0
HEADACHES: 0
DIARRHEA: 0
SHORTNESS OF BREATH: 0
FEVER: 0
NAUSEA: 1
HALLUCINATIONS: 0
ORTHOPNEA: 0
FALLS: 0
CHILLS: 0
DIZZINESS: 0
FLANK PAIN: 0
CONSTIPATION: 0
BLOOD IN STOOL: 0
TINGLING: 0
COUGH: 0
STRIDOR: 0
DEPRESSION: 0

## 2022-08-08 ASSESSMENT — PAIN DESCRIPTION - PAIN TYPE: TYPE: ACUTE PAIN

## 2022-08-08 ASSESSMENT — FIBROSIS 4 INDEX: FIB4 SCORE: 0.57

## 2022-08-08 NOTE — PROGRESS NOTES
[facility] Medicine Daily Progress Note    Date of Service  8/7/2022    Chief Complaint  Everett Peters is a 72 y.o. male admitted 8/5/2022 with  hypertension, hyperlipidemia, depression and generalized anxiety disorder previously on Remeron and buspirone, gout, chronic back pain, does not follow regularly with physician who presented 8/5/2022 with nausea vomiting and abdominal pain. He has had weight loss, decreased p.o. intake, occasional constipation, dyspnea on exertion, lower extremity edema, progression of his chronic low back pain. Mr. Peters has been feeling weak and fatigued for the past few months that has been progressive.     Hospital Course  In the ED he was initially tachycardic up to 184 and SVT, was given IV fluid and diltiazem and converted to sinus rhythm, he is afebrile, respiratory rate from 16-32 he was placed on 10 L nonrebreather to maintain oxygen saturations when he arrived.  Initial work-up with leukocytosis 26,000, hemoglobin 6.9, platelets 965, sodium 131 potassium 3.0 chloride 91 bicarb 14 BUN 28 creatinine 1.47 normal liver function lactic acid 4.5 troponin T 29 proBNP 3728.  .  Chest abdomen pelvis without contrast shows large mass involving the cecum and ascending colon in keeping with primary colon cancer, multiple enlarged right lower quadrant mesenteric lymph nodes likely metastasis, several mildly prominent retroperitoneal lymph nodes indeterminate, nonobstructive left renal calculus, dilated CBD and intrahepatic bile duct could relate to postcholecystectomy.    Interval Problem Update  8/6: Had a long discussion with him and his wife today in regards to his prognosis, findings on CT scan of the abdomen.  I spoke with GI and surgery who will evaluate the patient.  They requested a cardiac consult since he was having ST depressions and SVT.  We will continue to monitor his electrolytes.  Cardiac echo is pending.  8/7/2022:  Patient went for colonoscopy today with gastroenterology  recommendations appreciated.  Patient has significant mass burden involving ileocecal valve in addition to mass proximal to anal verge.  Multiple biopsies were taken.  Repeat CT scan imaging of the abdomen showed small air bubble behind cecum.  Appreciate general surgery recommendations.  Keep patient n.p.o. for now.  Reevaluate in the morning.  Continue with antimicrobial ceftriaxone in addition to metronidazole.  Reevaluate patient in the morning.  We will adjust pain medications as appropriate.  I have discussed this patient's plan of care and discharge plan at IDT rounds today with Case Management, Nursing, Nursing leadership, and other members of the IDT team.    Consultants/Specialty  cardiology, general surgery and GI    Code Status  DNAR/DNI    Disposition  Patient is not medically cleared for discharge.   Anticipate discharge to to home with close outpatient follow-up.  I have placed the appropriate orders for post-discharge needs.    Review of Systems  Review of Systems   Constitutional: Positive for malaise/fatigue and weight loss. Negative for chills, diaphoresis and fever.   HENT: Negative for congestion, ear discharge, ear pain, hearing loss, nosebleeds, sinus pain, sore throat and tinnitus.    Eyes: Negative for blurred vision, double vision, photophobia and pain.   Respiratory: Negative for cough, hemoptysis, sputum production, shortness of breath, wheezing and stridor.    Cardiovascular: Negative for chest pain, palpitations, orthopnea, claudication, leg swelling and PND.   Gastrointestinal: Positive for abdominal pain and nausea. Negative for blood in stool, constipation, diarrhea, heartburn, melena and vomiting.   Genitourinary: Negative for dysuria, flank pain, frequency, hematuria and urgency.   Musculoskeletal: Negative for back pain, falls, joint pain, myalgias and neck pain.   Skin: Negative for itching and rash.   Neurological: Negative for dizziness, tingling, tremors, weakness and  headaches.   Endo/Heme/Allergies: Negative for environmental allergies and polydipsia. Does not bruise/bleed easily.   Psychiatric/Behavioral: Negative for depression, hallucinations, substance abuse and suicidal ideas.        Physical Exam  Temp:  [36.2 °C (97.1 °F)-37 °C (98.6 °F)] 36.8 °C (98.2 °F)  Pulse:  [] 92  Resp:  [15-22] 16  BP: ()/(49-77) 110/64  SpO2:  [91 %-100 %] 98 %    Physical Exam  Vitals and nursing note reviewed.   Constitutional:       General: He is not in acute distress.     Appearance: Normal appearance. He is not ill-appearing, toxic-appearing or diaphoretic.   HENT:      Head: Normocephalic and atraumatic.      Nose: No congestion or rhinorrhea.      Mouth/Throat:      Pharynx: No posterior oropharyngeal erythema.   Eyes:      General: No scleral icterus.        Right eye: No discharge.   Cardiovascular:      Rate and Rhythm: Normal rate and regular rhythm.      Pulses: Normal pulses.      Heart sounds: Normal heart sounds. No murmur heard.    No friction rub. No gallop.   Pulmonary:      Effort: Pulmonary effort is normal. No respiratory distress.      Breath sounds: Normal breath sounds. No stridor. No wheezing, rhonchi or rales.   Abdominal:      General: There is no distension.      Tenderness: There is no abdominal tenderness.      Comments: Hypoactive bowel sounds   Musculoskeletal:         General: No swelling, tenderness, deformity or signs of injury.      Cervical back: Normal range of motion.      Right lower leg: No edema.      Left lower leg: No edema.   Skin:     Capillary Refill: Capillary refill takes less than 2 seconds.      Coloration: Skin is not jaundiced or pale.      Findings: No bruising, erythema, lesion or rash.   Neurological:      General: No focal deficit present.      Mental Status: He is alert and oriented to person, place, and time.         Fluids    Intake/Output Summary (Last 24 hours) at 8/7/2022 7002  Last data filed at 8/7/2022 1102  Gross  per 24 hour   Intake 600 ml   Output 1002 ml   Net -402 ml       Laboratory  Recent Labs     08/05/22 2045 08/06/22 0245 08/07/22  0026   WBC 26.0* 20.8* 16.9*   RBC 3.30* 3.28* 3.25*   HEMOGLOBIN 6.9* 7.2* 7.2*   HEMATOCRIT 25.1* 25.5* 24.3*   MCV 76.1* 77.7* 74.8*   MCH 20.9* 22.0* 22.2*   MCHC 27.5* 28.2* 29.6*   RDW 51.8* 55.2* 50.4*   PLATELETCT 965* 631* 624*   MPV 8.1* 7.9* 8.2*     Recent Labs     08/05/22 2045 08/06/22 0245 08/07/22  0026   SODIUM 131* 133* 132*   POTASSIUM 3.0* 3.2* 3.3*   CHLORIDE 91* 96 94*   CO2 14* 20 22   GLUCOSE 225* 131* 102*   BUN 28* 24* 16   CREATININE 1.47* 1.00 0.77   CALCIUM 9.1 8.8 8.8     Recent Labs     08/05/22 2045   INR 1.21*               Imaging  CT-ABDOMEN-PELVIS WITH   Final Result      1.  Large cecal mass consistent with colon cancer.      2.  Metastatic lymphadenopathy in the right lower quadrant mesentery and possibly in the retroperitoneum.      3.  Small focus of extraluminal gas identified posterior to the large mass in the right lower quadrant.      4.  Intrahepatic and extra hepatic biliary duct dilatation possibly related to previous cholecystectomy and patient age. Recommend correlation with liver function tests.      5.  Nonobstructive renal stones.      6.  This was discussed with Dr. Bennett at 4:20 PM.      CT-CTA CHEST PULMONARY ARTERY W/ RECONS   Final Result         1. No CT evidence of pulmonary embolism.   2. Patchy right lower lung opacities, likely atelectasis.   3. No pleural effusion or pneumothorax.         DX-CHEST-PORTABLE (1 VIEW)   Final Result         1. No acute cardiopulmonary abnormalities are identified.      CT-CHEST,ABDOMEN,PELVIS W/O   Final Result      Limited exam due to lack of oral or IV contrast.      1. Large mass involving the cecum and ascending colon, in keeping with primary colon cancer.         2.  Multiple enlarged right lower quadrant mesenteric lymph nodes, likely metastasis. Several mildly prominent  retroperitoneal lymph nodes, indeterminate.      3. Nonobstructive left renal calculus.      4. Dilated CBD and intrahepatic bile duct could relate to post cholecystectomy status. Correlate with LFTs.      DX-CHEST-PORTABLE (1 VIEW)   Final Result         1. No acute cardiopulmonary abnormalities are identified.      EC-ECHOCARDIOGRAM COMPLETE W/O CONT    (Results Pending)        Assessment/Plan  * Malignant neoplasm of ascending colon (HCC)- (present on admission)  Assessment & Plan  CT shows large mass that is suspected primary colonic cancer.  GI and colorectal surgery has been consulted    Constipation  Assessment & Plan  Related to large right-sided mass  Continue bowel protocol  Patient will need clear bowels for colonoscopy    Advance care planning- (present on admission)  Assessment & Plan  I discussed advance care planning with the patient and family for at least 22 minutes, including diagnosis, prognosis, plan of care, risks and benefits of any therapies that could be offered, as well as alternatives including palliation and hospice, as appropriate.  DNR, okay with defibrillation.      Pain, chronic- (present on admission)  Assessment & Plan  Scheduled and as needed breakthrough pain medications    SVT (supraventricular tachycardia) (HCC)- (present on admission)  Assessment & Plan  Resolved with IV fluid and 1 dose of IV diltiazem in the ED.  Monitor on telemetry.      Elevated troponin- (present on admission)  Assessment & Plan  Likely secondary to tachyarrhythmia/SVT, no ongoing chest pain, no ST segment elevations or depressions.  Continue to trend troponin.  Monitor on telemetry.  Repeat EKG if he develops any chest pain.    MICHAEL (acute kidney injury) (HCC)- (present on admission)  Assessment & Plan  Resolved      Hypokalemia- (present on admission)  Assessment & Plan  P.o. replacement ordered, monitor    Anemia- (present on admission)  Assessment & Plan  Likley 2/2 chronic GI losses with large colonic  mass suspected to be colon Ca.  Transfuse hemoglobin less than 7  Serially monitor hemoglobin    Sepsis (HCC)- (present on admission)  Assessment & Plan  This is Sepsis Present on admission  SIRS criteria identified on my evaluation include: Tachycardia, with heart rate greater than 90 BPM, Tachypnea, with respirations greater than 20 per minute and Leukocytosis, with WBC greater than 12,000  Source is Suspected abdominal, possibly just gastroenteritis, symptoms of nausea, vomiting, abdominal pain. On C3  Sepsis protocol initiated  Fluid resuscitation ordered per protocol  Crystalloid Fluid Administration: Fluid resuscitation ordered per standard protocol - 30 mL/kg per current or ideal body weight  IV antibiotics as appropriate for source of sepsis  Reassessment: I have reassessed the patient's hemodynamic status             VTE prophylaxis: SCDs/TEDs    I have performed a physical exam and reviewed and updated ROS and Plan today (8/7/2022). In review of yesterday's note (8/6/2022), there are no changes except as documented above.

## 2022-08-08 NOTE — PROGRESS NOTES
Report received from Rn. Assumed pt care. Pt is resting in bed on 2.5L NC Pt A&O x 4. Fall precautions in place, call light and belongings within reach, bed in lowest position. No signs of distress.

## 2022-08-08 NOTE — PROGRESS NOTES
[facility] Medicine Daily Progress Note    Date of Service  8/8/2022    Chief Complaint  Everett Peters is a 72 y.o. male admitted 8/5/2022 with  hypertension, hyperlipidemia, depression and generalized anxiety disorder previously on Remeron and buspirone, gout, chronic back pain, does not follow regularly with physician who presented 8/5/2022 with nausea vomiting and abdominal pain. He has had weight loss, decreased p.o. intake, occasional constipation, dyspnea on exertion, lower extremity edema, progression of his chronic low back pain. Mr. Peters has been feeling weak and fatigued for the past few months that has been progressive.     Hospital Course  In the ED he was initially tachycardic up to 184 and SVT, was given IV fluid and diltiazem and converted to sinus rhythm, he is afebrile, respiratory rate from 16-32 he was placed on 10 L nonrebreather to maintain oxygen saturations when he arrived.  Initial work-up with leukocytosis 26,000, hemoglobin 6.9, platelets 965, sodium 131 potassium 3.0 chloride 91 bicarb 14 BUN 28 creatinine 1.47 normal liver function lactic acid 4.5 troponin T 29 proBNP 3728.  .  Chest abdomen pelvis without contrast shows large mass involving the cecum and ascending colon in keeping with primary colon cancer, multiple enlarged right lower quadrant mesenteric lymph nodes likely metastasis, several mildly prominent retroperitoneal lymph nodes indeterminate, nonobstructive left renal calculus, dilated CBD and intrahepatic bile duct could relate to postcholecystectomy.    Interval Problem Update  8/6: Had a long discussion with him and his wife today in regards to his prognosis, findings on CT scan of the abdomen.  I spoke with GI and surgery who will evaluate the patient.  They requested a cardiac consult since he was having ST depressions and SVT.  We will continue to monitor his electrolytes.  Cardiac echo is pending.  8/7/2022:  Patient went for colonoscopy today with gastroenterology  recommendations appreciated.  Patient has significant mass burden involving ileocecal valve in addition to mass proximal to anal verge.  Multiple biopsies were taken.  Repeat CT scan imaging of the abdomen showed small air bubble behind cecum.  Appreciate general surgery recommendations.  Keep patient n.p.o. for now.  Reevaluate in the morning.  Continue with antimicrobial ceftriaxone in addition to metronidazole.  Reevaluate patient in the morning.  We will adjust pain medications as appropriate.  8/8/2022:  Appreciate general surgery recommendations.  Patient is scheduled to undergo MRI on 3 Joann magnet with rectal cancer protocol.  Dr. Bennett general surgery is following.  Continue with pain control anticipate surgery during this hospitalization there was originally thought that surgery would be performed as outpatient however this may be untenable.  Patient is already required transfusion of blood products.  I have discussed this patient's plan of care and discharge plan at IDT rounds today with Case Management, Nursing, Nursing leadership, and other members of the IDT team.    Consultants/Specialty  cardiology, general surgery and GI    Code Status  DNAR/DNI    Disposition  Patient is not medically cleared for discharge.   Anticipate discharge to to home with close outpatient follow-up.  I have placed the appropriate orders for post-discharge needs.    Review of Systems  Review of Systems   Constitutional: Positive for malaise/fatigue and weight loss. Negative for chills, diaphoresis and fever.   HENT: Negative for congestion, ear discharge, ear pain, hearing loss, nosebleeds, sinus pain, sore throat and tinnitus.    Eyes: Negative for blurred vision, double vision, photophobia and pain.   Respiratory: Negative for cough, hemoptysis, sputum production, shortness of breath, wheezing and stridor.    Cardiovascular: Negative for chest pain, palpitations, orthopnea, claudication, leg swelling and PND.    Gastrointestinal: Positive for abdominal pain and nausea. Negative for blood in stool, constipation, diarrhea, heartburn, melena and vomiting.   Genitourinary: Negative for dysuria, flank pain, frequency, hematuria and urgency.   Musculoskeletal: Negative for back pain, falls, joint pain, myalgias and neck pain.   Skin: Negative for itching and rash.   Neurological: Negative for dizziness, tingling, tremors, weakness and headaches.   Endo/Heme/Allergies: Negative for environmental allergies and polydipsia. Does not bruise/bleed easily.   Psychiatric/Behavioral: Negative for depression, hallucinations, substance abuse and suicidal ideas.        Physical Exam  Temp:  [35.9 °C (96.7 °F)-36.7 °C (98 °F)] 36.7 °C (98 °F)  Pulse:  [78-97] 97  Resp:  [18] 18  BP: (105-122)/(61-68) 116/61  SpO2:  [91 %-98 %] 93 %    Physical Exam  Vitals and nursing note reviewed.   Constitutional:       General: He is not in acute distress.     Appearance: Normal appearance. He is not ill-appearing, toxic-appearing or diaphoretic.   HENT:      Head: Normocephalic and atraumatic.      Nose: No congestion or rhinorrhea.      Mouth/Throat:      Pharynx: No posterior oropharyngeal erythema.   Eyes:      General: No scleral icterus.        Right eye: No discharge.   Cardiovascular:      Rate and Rhythm: Normal rate and regular rhythm.      Pulses: Normal pulses.      Heart sounds: Normal heart sounds. No murmur heard.    No friction rub. No gallop.   Pulmonary:      Effort: Pulmonary effort is normal. No respiratory distress.      Breath sounds: Normal breath sounds. No stridor. No wheezing, rhonchi or rales.   Abdominal:      General: There is no distension.      Tenderness: There is no abdominal tenderness.      Comments: Hypoactive bowel sounds   Musculoskeletal:         General: No swelling, tenderness, deformity or signs of injury.      Cervical back: Normal range of motion.      Right lower leg: No edema.      Left lower leg: No edema.    Skin:     Capillary Refill: Capillary refill takes less than 2 seconds.      Coloration: Skin is not jaundiced or pale.      Findings: No bruising, erythema, lesion or rash.   Neurological:      General: No focal deficit present.      Mental Status: He is alert and oriented to person, place, and time.         Fluids    Intake/Output Summary (Last 24 hours) at 8/8/2022 1613  Last data filed at 8/8/2022 1300  Gross per 24 hour   Intake --   Output 1225 ml   Net -1225 ml       Laboratory  Recent Labs     08/05/22 2045 08/06/22 0245 08/07/22  0026   WBC 26.0* 20.8* 16.9*   RBC 3.30* 3.28* 3.25*   HEMOGLOBIN 6.9* 7.2* 7.2*   HEMATOCRIT 25.1* 25.5* 24.3*   MCV 76.1* 77.7* 74.8*   MCH 20.9* 22.0* 22.2*   MCHC 27.5* 28.2* 29.6*   RDW 51.8* 55.2* 50.4*   PLATELETCT 965* 631* 624*   MPV 8.1* 7.9* 8.2*     Recent Labs     08/05/22 2045 08/06/22 0245 08/07/22  0026   SODIUM 131* 133* 132*   POTASSIUM 3.0* 3.2* 3.3*   CHLORIDE 91* 96 94*   CO2 14* 20 22   GLUCOSE 225* 131* 102*   BUN 28* 24* 16   CREATININE 1.47* 1.00 0.77   CALCIUM 9.1 8.8 8.8     Recent Labs     08/05/22 2045   INR 1.21*               Imaging  EC-ECHOCARDIOGRAM COMPLETE W/O CONT   Final Result      CT-ABDOMEN-PELVIS WITH   Final Result      1.  Large cecal mass consistent with colon cancer.      2.  Metastatic lymphadenopathy in the right lower quadrant mesentery and possibly in the retroperitoneum.      3.  Small focus of extraluminal gas identified posterior to the large mass in the right lower quadrant.      4.  Intrahepatic and extra hepatic biliary duct dilatation possibly related to previous cholecystectomy and patient age. Recommend correlation with liver function tests.      5.  Nonobstructive renal stones.      6.  This was discussed with Dr. Bennett at 4:20 PM.      CT-CTA CHEST PULMONARY ARTERY W/ RECONS   Final Result         1. No CT evidence of pulmonary embolism.   2. Patchy right lower lung opacities, likely atelectasis.   3. No pleural  effusion or pneumothorax.         DX-CHEST-PORTABLE (1 VIEW)   Final Result         1. No acute cardiopulmonary abnormalities are identified.      CT-CHEST,ABDOMEN,PELVIS W/O   Final Result      Limited exam due to lack of oral or IV contrast.      1. Large mass involving the cecum and ascending colon, in keeping with primary colon cancer.         2.  Multiple enlarged right lower quadrant mesenteric lymph nodes, likely metastasis. Several mildly prominent retroperitoneal lymph nodes, indeterminate.      3. Nonobstructive left renal calculus.      4. Dilated CBD and intrahepatic bile duct could relate to post cholecystectomy status. Correlate with LFTs.      DX-CHEST-PORTABLE (1 VIEW)   Final Result         1. No acute cardiopulmonary abnormalities are identified.      MR-PELVIS-WITH & W/O AND SEQUENCES    (Results Pending)        Assessment/Plan  * Malignant neoplasm of ascending colon (HCC)- (present on admission)  Assessment & Plan  CT shows large mass that is suspected primary colonic cancer.  GI and colorectal surgery has been consulted  8/8/2022:  Biopsies presently pending continue present medical management.  General surgery Dr. Bennett anticipate surgery during this hospitalization originally was planned for possible surgery as outpatient.  Patient presently pending MRI with rectal protocol for staging and surgical intervention.  This will be performed on 3 Joann magnet MRI.  Outpatient location.    Constipation  Assessment & Plan  Related to large right-sided mass  Continue bowel protocol  Patient will need clear bowels for colonoscopy    Advance care planning- (present on admission)  Assessment & Plan  I discussed advance care planning with the patient and family for at least 22 minutes, including diagnosis, prognosis, plan of care, risks and benefits of any therapies that could be offered, as well as alternatives including palliation and hospice, as appropriate.  DNR, okay with  defibrillation.      Pain, chronic- (present on admission)  Assessment & Plan  Scheduled and as needed breakthrough pain medications    SVT (supraventricular tachycardia) ([facility])- (present on admission)  Assessment & Plan  Resolved with IV fluid and 1 dose of IV diltiazem in the ED.  Monitor on telemetry.      Elevated troponin- (present on admission)  Assessment & Plan  Likely secondary to tachyarrhythmia/SVT, no ongoing chest pain, no ST segment elevations or depressions.  Continue to trend troponin.  Monitor on telemetry.  Repeat EKG if he develops any chest pain.    MICHAEL (acute kidney injury) (HCC)- (present on admission)  Assessment & Plan  Resolved      Hypokalemia- (present on admission)  Assessment & Plan  P.o. replacement ordered, monitor    Anemia- (present on admission)  Assessment & Plan  Likley 2/2 chronic GI losses with large colonic mass suspected to be colon Ca.  Transfuse hemoglobin less than 7  Serially monitor hemoglobin  8/8/2022:  Continue present medical management patient did require transfusion of 1 unit PRBC.  Continue to hold anticoagulation at present.  Additionally there is concern of recent biopsies there is air bubbles behind cecum unclear as to whether this represents small perforation or otherwise.  Hemodynamically patient is hemoglobin remains above 7 at this point clarify when to reinitiate anticoagulation.    Sepsis (HCC)- (present on admission)  Assessment & Plan  This is Sepsis Present on admission  SIRS criteria identified on my evaluation include: Tachycardia, with heart rate greater than 90 BPM, Tachypnea, with respirations greater than 20 per minute and Leukocytosis, with WBC greater than 12,000  Source is Suspected abdominal, possibly just gastroenteritis, symptoms of nausea, vomiting, abdominal pain. On C3  Sepsis protocol initiated  Fluid resuscitation ordered per protocol  Crystalloid Fluid Administration: Fluid resuscitation ordered per standard protocol - 30 mL/kg per  current or ideal body weight  IV antibiotics as appropriate for source of sepsis  Reassessment: I have reassessed the patient's hemodynamic status         Please note that this dictation was created using voice recognition software. I have made every reasonable attempt to correct obvious errors, but I expect that there are errors of grammar and possibly context that I did not discover before finalizing the note.    VTE prophylaxis: SCDs/TEDs and pharmacologic prophylaxis contraindicated due to High risk of bleeding patient already received transfusion of 1 unit PRBC    I have performed a physical exam and reviewed and updated ROS and Plan today (8/8/2022). In review of yesterday's note (8/7/2022), there are no changes except as documented above.

## 2022-08-08 NOTE — CARE PLAN
Problem: Urinary - Renal Perfusion  Goal: Ability to achieve and maintain adequate renal perfusion and functioning will improve  Outcome: Progressing     Problem: Respiratory  Goal: Patient will achieve/maintain optimum respiratory ventilation and gas exchange  Outcome: Progressing   The patient is Stable - Low risk of patient condition declining or worsening    Shift Goals  Clinical Goals: monitor VS,  Patient Goals: rest  Family Goals: marsha    Progress made toward(s) clinical / shift goals:      Patient is not progressing towards the following goals:

## 2022-08-09 LAB
ANION GAP SERPL CALC-SCNC: 14 MMOL/L (ref 7–16)
BASOPHILS # BLD AUTO: 0.3 % (ref 0–1.8)
BASOPHILS # BLD: 0.04 K/UL (ref 0–0.12)
BUN SERPL-MCNC: 9 MG/DL (ref 8–22)
CALCIUM SERPL-MCNC: 8.8 MG/DL (ref 8.5–10.5)
CHLORIDE SERPL-SCNC: 91 MMOL/L (ref 96–112)
CO2 SERPL-SCNC: 24 MMOL/L (ref 20–33)
CREAT SERPL-MCNC: 0.65 MG/DL (ref 0.5–1.4)
EOSINOPHIL # BLD AUTO: 0.14 K/UL (ref 0–0.51)
EOSINOPHIL NFR BLD: 1.1 % (ref 0–6.9)
ERYTHROCYTE [DISTWIDTH] IN BLOOD BY AUTOMATED COUNT: 57.4 FL (ref 35.9–50)
GFR SERPLBLD CREATININE-BSD FMLA CKD-EPI: 100 ML/MIN/1.73 M 2
GLUCOSE SERPL-MCNC: 95 MG/DL (ref 65–99)
HCT VFR BLD AUTO: 26.5 % (ref 42–52)
HGB BLD-MCNC: 7.5 G/DL (ref 14–18)
IMM GRANULOCYTES # BLD AUTO: 0.1 K/UL (ref 0–0.11)
IMM GRANULOCYTES NFR BLD AUTO: 0.8 % (ref 0–0.9)
LYMPHOCYTES # BLD AUTO: 1.24 K/UL (ref 1–4.8)
LYMPHOCYTES NFR BLD: 9.3 % (ref 22–41)
MCH RBC QN AUTO: 22 PG (ref 27–33)
MCHC RBC AUTO-ENTMCNC: 28.3 G/DL (ref 33.7–35.3)
MCV RBC AUTO: 77.7 FL (ref 81.4–97.8)
MONOCYTES # BLD AUTO: 1.07 K/UL (ref 0–0.85)
MONOCYTES NFR BLD AUTO: 8 % (ref 0–13.4)
NEUTROPHILS # BLD AUTO: 10.71 K/UL (ref 1.82–7.42)
NEUTROPHILS NFR BLD: 80.5 % (ref 44–72)
NRBC # BLD AUTO: 0 K/UL
NRBC BLD-RTO: 0 /100 WBC
PLATELET # BLD AUTO: 557 K/UL (ref 164–446)
PMV BLD AUTO: 8.2 FL (ref 9–12.9)
POTASSIUM SERPL-SCNC: 3.3 MMOL/L (ref 3.6–5.5)
RBC # BLD AUTO: 3.41 M/UL (ref 4.7–6.1)
SODIUM SERPL-SCNC: 129 MMOL/L (ref 135–145)
WBC # BLD AUTO: 13.3 K/UL (ref 4.8–10.8)

## 2022-08-09 PROCEDURE — 770020 HCHG ROOM/CARE - TELE (206)

## 2022-08-09 PROCEDURE — 700102 HCHG RX REV CODE 250 W/ 637 OVERRIDE(OP): Performed by: STUDENT IN AN ORGANIZED HEALTH CARE EDUCATION/TRAINING PROGRAM

## 2022-08-09 PROCEDURE — 80048 BASIC METABOLIC PNL TOTAL CA: CPT

## 2022-08-09 PROCEDURE — 36415 COLL VENOUS BLD VENIPUNCTURE: CPT

## 2022-08-09 PROCEDURE — A9270 NON-COVERED ITEM OR SERVICE: HCPCS

## 2022-08-09 PROCEDURE — 85025 COMPLETE CBC W/AUTO DIFF WBC: CPT

## 2022-08-09 PROCEDURE — A9270 NON-COVERED ITEM OR SERVICE: HCPCS | Performed by: STUDENT IN AN ORGANIZED HEALTH CARE EDUCATION/TRAINING PROGRAM

## 2022-08-09 PROCEDURE — 700111 HCHG RX REV CODE 636 W/ 250 OVERRIDE (IP): Performed by: STUDENT IN AN ORGANIZED HEALTH CARE EDUCATION/TRAINING PROGRAM

## 2022-08-09 PROCEDURE — 700102 HCHG RX REV CODE 250 W/ 637 OVERRIDE(OP)

## 2022-08-09 PROCEDURE — 99233 SBSQ HOSP IP/OBS HIGH 50: CPT | Mod: GC | Performed by: INTERNAL MEDICINE

## 2022-08-09 RX ORDER — POTASSIUM CHLORIDE 20 MEQ/1
40 TABLET, EXTENDED RELEASE ORAL ONCE
Status: COMPLETED | OUTPATIENT
Start: 2022-08-09 | End: 2022-08-09

## 2022-08-09 RX ADMIN — OXYCODONE HYDROCHLORIDE 15 MG: 10 TABLET ORAL at 20:12

## 2022-08-09 RX ADMIN — OXYCODONE HYDROCHLORIDE 15 MG: 10 TABLET ORAL at 23:49

## 2022-08-09 RX ADMIN — POTASSIUM CHLORIDE 40 MEQ: 1500 TABLET, EXTENDED RELEASE ORAL at 18:44

## 2022-08-09 RX ADMIN — METOPROLOL TARTRATE 25 MG: 25 TABLET, FILM COATED ORAL at 17:00

## 2022-08-09 RX ADMIN — ACETAMINOPHEN 650 MG: 325 TABLET, FILM COATED ORAL at 20:13

## 2022-08-09 RX ADMIN — HYDROMORPHONE HYDROCHLORIDE 0.5 MG: 1 INJECTION, SOLUTION INTRAMUSCULAR; INTRAVENOUS; SUBCUTANEOUS at 00:21

## 2022-08-09 RX ADMIN — OXYCODONE HYDROCHLORIDE 15 MG: 10 TABLET ORAL at 11:30

## 2022-08-09 RX ADMIN — SENNOSIDES AND DOCUSATE SODIUM 2 TABLET: 50; 8.6 TABLET ORAL at 05:26

## 2022-08-09 RX ADMIN — OXYCODONE HYDROCHLORIDE 15 MG: 10 TABLET ORAL at 07:08

## 2022-08-09 RX ADMIN — METRONIDAZOLE 500 MG: 500 TABLET ORAL at 05:26

## 2022-08-09 RX ADMIN — METOPROLOL TARTRATE 25 MG: 25 TABLET, FILM COATED ORAL at 05:26

## 2022-08-09 RX ADMIN — OXYCODONE HYDROCHLORIDE 15 MG: 10 TABLET ORAL at 03:13

## 2022-08-09 RX ADMIN — OXYCODONE HYDROCHLORIDE 20 MG: 20 TABLET, FILM COATED, EXTENDED RELEASE ORAL at 16:58

## 2022-08-09 RX ADMIN — METRONIDAZOLE 500 MG: 500 TABLET ORAL at 20:12

## 2022-08-09 RX ADMIN — OXYCODONE HYDROCHLORIDE 20 MG: 20 TABLET, FILM COATED, EXTENDED RELEASE ORAL at 05:25

## 2022-08-09 RX ADMIN — CEFUROXIME AXETIL 500 MG: 500 TABLET ORAL at 16:58

## 2022-08-09 RX ADMIN — CEFUROXIME AXETIL 500 MG: 500 TABLET ORAL at 05:26

## 2022-08-09 RX ADMIN — METRONIDAZOLE 500 MG: 500 TABLET ORAL at 15:18

## 2022-08-09 RX ADMIN — SENNOSIDES AND DOCUSATE SODIUM 2 TABLET: 50; 8.6 TABLET ORAL at 17:02

## 2022-08-09 ASSESSMENT — ENCOUNTER SYMPTOMS
BACK PAIN: 1
NAUSEA: 0
DIZZINESS: 0
VOMITING: 0
NERVOUS/ANXIOUS: 1
COUGH: 0
EYE PAIN: 0
SHORTNESS OF BREATH: 0
ABDOMINAL PAIN: 1
PALPITATIONS: 0
FEVER: 0
TINGLING: 0
BLURRED VISION: 0
CHILLS: 0

## 2022-08-09 ASSESSMENT — PAIN DESCRIPTION - PAIN TYPE
TYPE: ACUTE PAIN

## 2022-08-09 NOTE — PROGRESS NOTES
Report received from Rn. Assumed pt care. Pt is c/o back pain, medicated per MAR. Pt A&O x 4. Fall precautions in place, call light and belongings within reach, bed in lowest position. No signs of distress.

## 2022-08-09 NOTE — PROGRESS NOTES
"Surgical Progress Note:    S:  - Feeling well  - Tolerating diet  - Passing gas/BMs  - Ambulating  - No chest pain, extremity pain, or difficulty breathing    Vitals:  /66   Pulse 83   Temp 36.4 °C (97.5 °F) (Temporal)   Resp 18   Ht 1.854 m (6' 1\")   Wt 85.9 kg (189 lb 6 oz)   SpO2 90%   BMI 24.99 kg/m²     I/O:   Intake/Output Summary (Last 24 hours) at 8/8/2022 1746  Last data filed at 8/8/2022 1300  Gross per 24 hour   Intake --   Output 1225 ml   Net -1225 ml       P/E:  Constitutional: Appears well, no distress  Head/Neck: Normocephalic, atraumatic, neck supple  Cardiovascular: Normal rate and rhythm  Pulmonary/Chest: Normal respiratory effort, no wheezes  Abdominal: Soft, mildly tender, no distension  Musculoskeletal: No deficits  Neurological:  Alert, oriented  Skin:  Warm and dry, no erythema  Extremities: No edema, no evidence of DVT    Labs:  Recent Labs     08/07/22  0026   WBC 16.9*   RBC 3.25*   HEMOGLOBIN 7.2*   HEMATOCRIT 24.3*   MCV 74.8*   MCH 22.2*   RDW 50.4*   PLATELETCT 624*   MPV 8.2*   NEUTSPOLYS 76.80*   LYMPHOCYTES 14.30*   MONOCYTES 8.00   EOSINOPHILS 0.10   BASOPHILS 0.20     Recent Labs     08/07/22  0026   SODIUM 132*   POTASSIUM 3.3*   CHLORIDE 94*   CO2 22   GLUCOSE 102*   BUN 16         A/P:   72-year-old male with new diagnosis of cecal mass in keeping with colon cancer without obstruction or significant bleeding.  On colonoscopy he was found to have a large cecal mass and the ileocecal valve was unable to be identified.  Biopsies were taken and the mass appeared to extend up towards the hepatic flexure and was tattooed distally.  A second mass was found in the rectum at approximately 10 cm from the anal verge that was biopsied.  Small bubble of air posterior to cecum after colonoscopy likely from tattoo.     The patient underwent a noncontrast CT chest abdomen and pelvis on August 5.  The patient had a CT for PE protocol today that did not demonstrate any evidence of " metastatic disease within the chest.  A CT scan of the abdomen and pelvis with IV contrast did not demonstrate any metastatic disease other then localized lymphnodes.  CEA 4.5.  The patient may also require a rectal cancer protocol MRI on the magno 3.0 as an outpatient for further staging of the rectal mass.     The patient will require surgical management for a right colectomy for the cecal mass and will require either a transanal minimally invasive surgery or a low anterior resection for the rectal mass (or possible chemo radiation).  We will await biopsies and completion of staging work-up prior to creating a plan for surgery. Surgery will be planned electively as an outpatient once the above information is available.     Cardiology commented that SVT would not increased perioperative     Due to [employer] insurance the patient needs to be cared for within that system for the above plan.        Yamila Bennett M.D.  [facility]  [phone]

## 2022-08-09 NOTE — CARE PLAN
Problem: Urinary - Renal Perfusion  Goal: Ability to achieve and maintain adequate renal perfusion and functioning will improve  Outcome: Progressing     Problem: Respiratory  Goal: Patient will achieve/maintain optimum respiratory ventilation and gas exchange  Outcome: Progressing   The patient is Stable - Low risk of patient condition declining or worsening    Shift Goals  Clinical Goals: Monitor VS  Patient Goals: rest  Family Goals: marsha    Progress made toward(s) clinical / shift goals:      Patient is not progressing towards the following goals:

## 2022-08-09 NOTE — DISCHARGE PLANNING
Case Management Discharge Planning    Admission Date: 8/5/2022  GMLOS: 3.5  ALOS: 4    6-Clicks ADL Score: 20  6-Clicks Mobility Score: 18      Anticipated Discharge Dispo:      DME Needed: No    Action(s) Taken: RN CM received a call from the patient's wife Gerda.  Gerda asked for clarification regarding the plan of care.  RN CM explained that surgical plans are pending biopsy results.  Dylan wanted to know the specifics behind surgical arrangements.  BRIE SMITH asked Dr. Zavala to follow up with Gerda to discuss.    Escalations Completed: Provider    Medically Clear: No    Next Steps: Case coordination to follow for medical clearance.  PT/OT evals are pending.    Barriers to Discharge: Medical clearance, surgical plan    Is the patient up for discharge tomorrow: No

## 2022-08-09 NOTE — PROGRESS NOTES
"Surgical Progress Note:    S:  - Feeling well  - Tolerating clears, no n/v  - Passing gas/BMs  - Pain well managed  - Ambulating  - No chest pain, extremity pain, or difficulty breathing    Vitals:  /65   Pulse 97   Temp 36.7 °C (98 °F) (Temporal)   Resp 18   Ht 1.854 m (6' 1\")   Wt 85.9 kg (189 lb 6 oz)   SpO2 93%   BMI 24.99 kg/m²     I/O:   Intake/Output Summary (Last 24 hours) at 8/8/2022 1746  Last data filed at 8/8/2022 1300  Gross per 24 hour   Intake --   Output 1225 ml   Net -1225 ml       P/E:  Constitutional: Appears well, no distress  Head/Neck: Normocephalic, atraumatic, neck supple  Cardiovascular: Normal rate and rhythm  Pulmonary/Chest: Normal respiratory effort, no wheezes  Abdominal: Soft, mildly tender, no distension  Musculoskeletal: No deficits  Neurological:  Alert, oriented  Skin:  Warm and dry, no erythema  Extremities: No edema, no evidence of DVT    Labs:  Recent Labs     08/05/22 2045 08/06/22 0245 08/07/22  0026   WBC 26.0* 20.8* 16.9*   RBC 3.30* 3.28* 3.25*   HEMOGLOBIN 6.9* 7.2* 7.2*   HEMATOCRIT 25.1* 25.5* 24.3*   MCV 76.1* 77.7* 74.8*   MCH 20.9* 22.0* 22.2*   RDW 51.8* 55.2* 50.4*   PLATELETCT 965* 631* 624*   MPV 8.1* 7.9* 8.2*   NEUTSPOLYS 89.10* 87.10* 76.80*   LYMPHOCYTES 6.40* 7.00* 14.30*   MONOCYTES 1.80 4.30 8.00   EOSINOPHILS 0.90 0.00 0.10   BASOPHILS 0.00 0.10 0.20   RBCMORPHOLO Present Present  --      Recent Labs     08/05/22 2045 08/06/22 0245 08/07/22  0026   SODIUM 131* 133* 132*   POTASSIUM 3.0* 3.2* 3.3*   CHLORIDE 91* 96 94*   CO2 14* 20 22   GLUCOSE 225* 131* 102*   BUN 28* 24* 16         A/P:   72-year-old male with new diagnosis of cecal mass in keeping with colon cancer without obstruction or significant bleeding.  On colonoscopy he was found to have a large cecal mass and the ileocecal valve was unable to be identified.  Biopsies were taken and the mass appeared to extend up towards the hepatic flexure and was tattooed distally.  A second " mass was found in the rectum at approximately 10 cm from the anal verge that was biopsied.  Small bubble of air posterior to cecum after colonoscopy likely from tattoo.     The patient underwent a noncontrast CT chest abdomen and pelvis on August 5.  The patient had a CT for PE protocol today that did not demonstrate any evidence of metastatic disease within the chest.  A CT scan of the abdomen and pelvis with IV contrast did not demonstrate any metastatic disease other then localized lymphnodes.  CEA 4.5.  The patient may also require a rectal cancer protocol MRI on the magno 3.0 as an outpatient for further staging of the rectal mass.     The patient will require surgical management for a right colectomy for the cecal mass and will require either a transanal minimally invasive surgery or a low anterior resection for the rectal mass.  We will await biopsies and completion of staging work-up prior to creating a plan for surgery. Surgery will be planned electively as an outpatient once the above information is available.     Cardiology commented that SVT would not increased perioperative         Yamila Bennett M.D.  [facility]  [phone]

## 2022-08-10 LAB
ALBUMIN SERPL BCP-MCNC: 2.9 G/DL (ref 3.2–4.9)
ALBUMIN/GLOB SERPL: 0.7 G/DL
ALP SERPL-CCNC: 415 U/L (ref 30–99)
ALT SERPL-CCNC: 10 U/L (ref 2–50)
ANION GAP SERPL CALC-SCNC: 13 MMOL/L (ref 7–16)
AST SERPL-CCNC: 15 U/L (ref 12–45)
BASOPHILS # BLD AUTO: 0.4 % (ref 0–1.8)
BASOPHILS # BLD: 0.05 K/UL (ref 0–0.12)
BILIRUB SERPL-MCNC: 0.6 MG/DL (ref 0.1–1.5)
BUN SERPL-MCNC: 10 MG/DL (ref 8–22)
CALCIUM SERPL-MCNC: 9 MG/DL (ref 8.5–10.5)
CHLORIDE SERPL-SCNC: 90 MMOL/L (ref 96–112)
CO2 SERPL-SCNC: 24 MMOL/L (ref 20–33)
CREAT SERPL-MCNC: 0.73 MG/DL (ref 0.5–1.4)
EOSINOPHIL # BLD AUTO: 0.13 K/UL (ref 0–0.51)
EOSINOPHIL NFR BLD: 0.9 % (ref 0–6.9)
ERYTHROCYTE [DISTWIDTH] IN BLOOD BY AUTOMATED COUNT: 57.8 FL (ref 35.9–50)
FERRITIN SERPL-MCNC: 125 NG/ML (ref 22–322)
GFR SERPLBLD CREATININE-BSD FMLA CKD-EPI: 97 ML/MIN/1.73 M 2
GLOBULIN SER CALC-MCNC: 4 G/DL (ref 1.9–3.5)
GLUCOSE SERPL-MCNC: 108 MG/DL (ref 65–99)
HCT VFR BLD AUTO: 26.1 % (ref 42–52)
HGB BLD-MCNC: 7.3 G/DL (ref 14–18)
IMM GRANULOCYTES # BLD AUTO: 0.08 K/UL (ref 0–0.11)
IMM GRANULOCYTES NFR BLD AUTO: 0.6 % (ref 0–0.9)
IRON SATN MFR SERPL: 10 % (ref 15–55)
IRON SERPL-MCNC: 19 UG/DL (ref 50–180)
LYMPHOCYTES # BLD AUTO: 1.47 K/UL (ref 1–4.8)
LYMPHOCYTES NFR BLD: 10.7 % (ref 22–41)
MAGNESIUM SERPL-MCNC: 1.6 MG/DL (ref 1.5–2.5)
MCH RBC QN AUTO: 21.5 PG (ref 27–33)
MCHC RBC AUTO-ENTMCNC: 28 G/DL (ref 33.7–35.3)
MCV RBC AUTO: 77 FL (ref 81.4–97.8)
MONOCYTES # BLD AUTO: 1.39 K/UL (ref 0–0.85)
MONOCYTES NFR BLD AUTO: 10.1 % (ref 0–13.4)
NEUTROPHILS # BLD AUTO: 10.58 K/UL (ref 1.82–7.42)
NEUTROPHILS NFR BLD: 77.3 % (ref 44–72)
NRBC # BLD AUTO: 0 K/UL
NRBC BLD-RTO: 0 /100 WBC
PHOSPHATE SERPL-MCNC: 2.4 MG/DL (ref 2.5–4.5)
PLATELET # BLD AUTO: 604 K/UL (ref 164–446)
PMV BLD AUTO: 9 FL (ref 9–12.9)
POTASSIUM SERPL-SCNC: 3.5 MMOL/L (ref 3.6–5.5)
PROT SERPL-MCNC: 6.9 G/DL (ref 6–8.2)
RBC # BLD AUTO: 3.39 M/UL (ref 4.7–6.1)
SODIUM SERPL-SCNC: 127 MMOL/L (ref 135–145)
TIBC SERPL-MCNC: 190 UG/DL (ref 250–450)
TSH SERPL DL<=0.005 MIU/L-ACNC: 3.61 UIU/ML (ref 0.38–5.33)
UIBC SERPL-MCNC: 171 UG/DL (ref 110–370)
VIT B12 SERPL-MCNC: 1751 PG/ML (ref 211–911)
WBC # BLD AUTO: 13.7 K/UL (ref 4.8–10.8)

## 2022-08-10 PROCEDURE — 99233 SBSQ HOSP IP/OBS HIGH 50: CPT | Mod: GC | Performed by: INTERNAL MEDICINE

## 2022-08-10 PROCEDURE — 770020 HCHG ROOM/CARE - TELE (206)

## 2022-08-10 PROCEDURE — 82728 ASSAY OF FERRITIN: CPT

## 2022-08-10 PROCEDURE — A9270 NON-COVERED ITEM OR SERVICE: HCPCS | Performed by: STUDENT IN AN ORGANIZED HEALTH CARE EDUCATION/TRAINING PROGRAM

## 2022-08-10 PROCEDURE — 83550 IRON BINDING TEST: CPT

## 2022-08-10 PROCEDURE — 83735 ASSAY OF MAGNESIUM: CPT

## 2022-08-10 PROCEDURE — 97166 OT EVAL MOD COMPLEX 45 MIN: CPT

## 2022-08-10 PROCEDURE — 36415 COLL VENOUS BLD VENIPUNCTURE: CPT

## 2022-08-10 PROCEDURE — 84443 ASSAY THYROID STIM HORMONE: CPT

## 2022-08-10 PROCEDURE — 700102 HCHG RX REV CODE 250 W/ 637 OVERRIDE(OP): Performed by: STUDENT IN AN ORGANIZED HEALTH CARE EDUCATION/TRAINING PROGRAM

## 2022-08-10 PROCEDURE — 83540 ASSAY OF IRON: CPT

## 2022-08-10 PROCEDURE — 84100 ASSAY OF PHOSPHORUS: CPT

## 2022-08-10 PROCEDURE — 85025 COMPLETE CBC W/AUTO DIFF WBC: CPT

## 2022-08-10 PROCEDURE — 700111 HCHG RX REV CODE 636 W/ 250 OVERRIDE (IP): Performed by: STUDENT IN AN ORGANIZED HEALTH CARE EDUCATION/TRAINING PROGRAM

## 2022-08-10 PROCEDURE — 700102 HCHG RX REV CODE 250 W/ 637 OVERRIDE(OP)

## 2022-08-10 PROCEDURE — 80053 COMPREHEN METABOLIC PANEL: CPT

## 2022-08-10 PROCEDURE — 97162 PT EVAL MOD COMPLEX 30 MIN: CPT

## 2022-08-10 PROCEDURE — 82607 VITAMIN B-12: CPT

## 2022-08-10 PROCEDURE — A9270 NON-COVERED ITEM OR SERVICE: HCPCS

## 2022-08-10 RX ORDER — POTASSIUM CHLORIDE 20 MEQ/1
40 TABLET, EXTENDED RELEASE ORAL ONCE
Status: COMPLETED | OUTPATIENT
Start: 2022-08-10 | End: 2022-08-10

## 2022-08-10 RX ADMIN — OXYCODONE HYDROCHLORIDE 15 MG: 10 TABLET ORAL at 14:55

## 2022-08-10 RX ADMIN — METOPROLOL TARTRATE 25 MG: 25 TABLET, FILM COATED ORAL at 17:43

## 2022-08-10 RX ADMIN — CEFUROXIME AXETIL 500 MG: 500 TABLET ORAL at 17:47

## 2022-08-10 RX ADMIN — OXYCODONE HYDROCHLORIDE 20 MG: 20 TABLET, FILM COATED, EXTENDED RELEASE ORAL at 04:11

## 2022-08-10 RX ADMIN — OXYCODONE HYDROCHLORIDE 20 MG: 20 TABLET, FILM COATED, EXTENDED RELEASE ORAL at 17:43

## 2022-08-10 RX ADMIN — SENNOSIDES AND DOCUSATE SODIUM 2 TABLET: 50; 8.6 TABLET ORAL at 04:12

## 2022-08-10 RX ADMIN — METRONIDAZOLE 500 MG: 500 TABLET ORAL at 04:12

## 2022-08-10 RX ADMIN — METOPROLOL TARTRATE 25 MG: 25 TABLET, FILM COATED ORAL at 04:12

## 2022-08-10 RX ADMIN — POTASSIUM CHLORIDE 40 MEQ: 1500 TABLET, EXTENDED RELEASE ORAL at 08:02

## 2022-08-10 RX ADMIN — METRONIDAZOLE 500 MG: 500 TABLET ORAL at 13:46

## 2022-08-10 RX ADMIN — METRONIDAZOLE 500 MG: 500 TABLET ORAL at 20:58

## 2022-08-10 RX ADMIN — CEFUROXIME AXETIL 500 MG: 500 TABLET ORAL at 04:12

## 2022-08-10 RX ADMIN — OXYCODONE HYDROCHLORIDE 15 MG: 10 TABLET ORAL at 11:33

## 2022-08-10 RX ADMIN — HYDROMORPHONE HYDROCHLORIDE 0.5 MG: 1 INJECTION, SOLUTION INTRAMUSCULAR; INTRAVENOUS; SUBCUTANEOUS at 08:02

## 2022-08-10 RX ADMIN — OXYCODONE HYDROCHLORIDE 15 MG: 10 TABLET ORAL at 20:58

## 2022-08-10 ASSESSMENT — ENCOUNTER SYMPTOMS
CHILLS: 0
NAUSEA: 0
BLURRED VISION: 0
PALPITATIONS: 0
ABDOMINAL PAIN: 1
EYE PAIN: 0
COUGH: 0
VOMITING: 0
NERVOUS/ANXIOUS: 1
SHORTNESS OF BREATH: 0
DIZZINESS: 0
BACK PAIN: 1
FEVER: 0
TINGLING: 0

## 2022-08-10 ASSESSMENT — GAIT ASSESSMENTS
DISTANCE (FEET): 15
GAIT LEVEL OF ASSIST: CONTACT GUARD ASSIST
ASSISTIVE DEVICE: FRONT WHEEL WALKER

## 2022-08-10 ASSESSMENT — FIBROSIS 4 INDEX
FIB4 SCORE: 0.56
FIB4 SCORE: 0.56

## 2022-08-10 ASSESSMENT — COGNITIVE AND FUNCTIONAL STATUS - GENERAL
CLIMB 3 TO 5 STEPS WITH RAILING: A LOT
SUGGESTED CMS G CODE MODIFIER DAILY ACTIVITY: CK
PERSONAL GROOMING: A LITTLE
DRESSING REGULAR LOWER BODY CLOTHING: A LOT
TOILETING: A LITTLE
WALKING IN HOSPITAL ROOM: A LOT
DRESSING REGULAR UPPER BODY CLOTHING: A LITTLE
DAILY ACTIVITIY SCORE: 17
MOBILITY SCORE: 15
SUGGESTED CMS G CODE MODIFIER MOBILITY: CK
MOVING TO AND FROM BED TO CHAIR: A LOT
MOVING FROM LYING ON BACK TO SITTING ON SIDE OF FLAT BED: A LOT
STANDING UP FROM CHAIR USING ARMS: A LITTLE
HELP NEEDED FOR BATHING: A LOT

## 2022-08-10 ASSESSMENT — PAIN DESCRIPTION - PAIN TYPE
TYPE: ACUTE PAIN

## 2022-08-10 ASSESSMENT — ACTIVITIES OF DAILY LIVING (ADL): TOILETING: INDEPENDENT

## 2022-08-10 NOTE — PROGRESS NOTES
[facility] Internal Medicine Daily Progress Note    Date of Service  8/9/2022    [facility] Team: [employer] IM White Team   Attending: Harshal Dawson M.d.  Senior Resident: Dr. Cardona  Intern:  Dr. Zavala  Contact Number: [phone]    Chief Complaint  Nausea, vomiting, abdominal pain    Hospital Course  73 y/o man with hx of HTN, HLD, depression and generalized anxiety disorder previously on Remeron and buspirone, gout, and chronic back pain admitted 8/5/2022 with nausea, vomiting, and abdominal pain. Found to be in SVT in ED with HR up to 184. Resolved after IV fluids and diltiazem. Labs significant for wbc 06498, hgb 6.9, platelets 965, bicarb 14, BUN 28, creatinine 1.47, BNP 3728, and troponin 29. CT abdomen showed large mass in cecum and ascending colon with enlarged RLQ mesenteric lymph nodes. Colonoscopy done which showed significant mass burden involving ileocecal valve in addition to mass proximal to anal verge. Surgery recommending MRI on 3 Joann magnet with rectal cancer protocol and outpatient f/u. Pending PT eval for DC planning.     Interval Problem Update  Continues to have abdominal and back pain which is controlled with medication. States he feels unsteady and unable to walk to the bathroom on his own currently. Would like wife with him for any medical updates. Pending PT eval.     I have discussed this patient's plan of care and discharge plan at IDT rounds today with Case Management, Nursing, Nursing leadership, and other members of the IDT team.    Consultants/Specialty  general surgery and GI    Code Status  DNAR/DNI    Disposition  Patient is not medically cleared for discharge.   Anticipate discharge to to home with close outpatient follow-up.  I have placed the appropriate orders for post-discharge needs.    Review of Systems  Review of Systems   Constitutional: Negative for chills and fever.   HENT: Negative for ear pain and hearing loss.    Eyes: Negative for blurred vision and pain.   Respiratory: Negative  for cough and shortness of breath.    Cardiovascular: Negative for chest pain and palpitations.   Gastrointestinal: Positive for abdominal pain. Negative for nausea and vomiting.   Genitourinary: Negative for dysuria and frequency.   Musculoskeletal: Positive for back pain.   Skin: Negative for itching and rash.   Neurological: Negative for dizziness and tingling.   Psychiatric/Behavioral: The patient is nervous/anxious.         Physical Exam  Temp:  [36 °C (96.8 °F)-36.4 °C (97.5 °F)] 36 °C (96.8 °F)  Pulse:  [] 101  Resp:  [16-18] 17  BP: ()/(60-71) 122/65  SpO2:  [90 %-98 %] 98 %    Physical Exam  Constitutional:       Appearance: Normal appearance.   HENT:      Head: Normocephalic and atraumatic.      Nose: Nose normal.      Mouth/Throat:      Mouth: Mucous membranes are moist.      Pharynx: Oropharynx is clear.   Eyes:      Extraocular Movements: Extraocular movements intact.      Conjunctiva/sclera: Conjunctivae normal.   Cardiovascular:      Rate and Rhythm: Normal rate and regular rhythm.      Pulses: Normal pulses.      Heart sounds: Normal heart sounds.   Pulmonary:      Effort: Pulmonary effort is normal.      Breath sounds: Normal breath sounds.   Abdominal:      General: Abdomen is flat. Bowel sounds are normal.      Tenderness: There is abdominal tenderness (Diffuse but more in RLQ). There is no guarding or rebound.   Musculoskeletal:      Cervical back: Normal range of motion and neck supple.   Skin:     General: Skin is warm and dry.      Capillary Refill: Capillary refill takes less than 2 seconds.   Neurological:      General: No focal deficit present.      Mental Status: He is alert and oriented to person, place, and time.         Fluids    Intake/Output Summary (Last 24 hours) at 8/9/2022 1729  Last data filed at 8/9/2022 1100  Gross per 24 hour   Intake 120 ml   Output 1700 ml   Net -1580 ml       Laboratory  Recent Labs     08/07/22  0026 08/09/22  0752   WBC 16.9* 13.3*   RBC 3.25*  3.41*   HEMOGLOBIN 7.2* 7.5*   HEMATOCRIT 24.3* 26.5*   MCV 74.8* 77.7*   MCH 22.2* 22.0*   MCHC 29.6* 28.3*   RDW 50.4* 57.4*   PLATELETCT 624* 557*   MPV 8.2* 8.2*     Recent Labs     08/07/22  0026 08/09/22  0752   SODIUM 132* 129*   POTASSIUM 3.3* 3.3*   CHLORIDE 94* 91*   CO2 22 24   GLUCOSE 102* 95   BUN 16 9   CREATININE 0.77 0.65   CALCIUM 8.8 8.8                   Imaging  EC-ECHOCARDIOGRAM COMPLETE W/O CONT   Final Result      CT-ABDOMEN-PELVIS WITH   Final Result      1.  Large cecal mass consistent with colon cancer.      2.  Metastatic lymphadenopathy in the right lower quadrant mesentery and possibly in the retroperitoneum.      3.  Small focus of extraluminal gas identified posterior to the large mass in the right lower quadrant.      4.  Intrahepatic and extra hepatic biliary duct dilatation possibly related to previous cholecystectomy and patient age. Recommend correlation with liver function tests.      5.  Nonobstructive renal stones.      6.  This was discussed with Dr. Bennett at 4:20 PM.      CT-CTA CHEST PULMONARY ARTERY W/ RECONS   Final Result         1. No CT evidence of pulmonary embolism.   2. Patchy right lower lung opacities, likely atelectasis.   3. No pleural effusion or pneumothorax.         DX-CHEST-PORTABLE (1 VIEW)   Final Result         1. No acute cardiopulmonary abnormalities are identified.      CT-CHEST,ABDOMEN,PELVIS W/O   Final Result      Limited exam due to lack of oral or IV contrast.      1. Large mass involving the cecum and ascending colon, in keeping with primary colon cancer.         2.  Multiple enlarged right lower quadrant mesenteric lymph nodes, likely metastasis. Several mildly prominent retroperitoneal lymph nodes, indeterminate.      3. Nonobstructive left renal calculus.      4. Dilated CBD and intrahepatic bile duct could relate to post cholecystectomy status. Correlate with LFTs.      DX-CHEST-PORTABLE (1 VIEW)   Final Result         1. No acute  cardiopulmonary abnormalities are identified.      MR-PELVIS-WITH & W/O AND SEQUENCES    (Results Pending)        Assessment/Plan  Problem Representation:    * Malignant neoplasm of ascending colon (HCC)- (present on admission)  Assessment & Plan  CT shows large mass that is suspected primary colonic cancer.  GI and colorectal surgery has been consulted  8/8/2022:  Biopsies presently pending continue present medical management.  General surgery Dr. Bennett anticipate possible surgery as outpatient. Patient presently pending MRI with rectal protocol for staging and surgical intervention. This will be performed on 3 Joann magnet MRI, outpatient location.  Can advance diet    Constipation  Assessment & Plan  Related to large right-sided mass  Continue bowel protocol  Advance diet as tolerated    Pain, chronic- (present on admission)  Assessment & Plan  Scheduled and as needed breakthrough pain medications    SVT (supraventricular tachycardia) (HCC)- (present on admission)  Assessment & Plan  Resolved with IV fluid and 1 dose of IV diltiazem in the ED.  Telemetry monitoring    Elevated troponin- (present on admission)  Assessment & Plan  Likely secondary to tachyarrhythmia/SVT, no ongoing chest pain, no ST segment elevations or depressions.  Troponin trend: 29, 18, 27, 24  Repeat EKG if he develops any chest pain    MICHAEL (acute kidney injury) (HCC)- (present on admission)  Assessment & Plan  Resolved    Hypokalemia- (present on admission)  Assessment & Plan  P.o. replacement ordered, monitor    Anemia- (present on admission)  Assessment & Plan  Likley 2/2 chronic GI losses with large colonic mass suspected to be colon Ca.  Transfuse hemoglobin less than 7  Serially monitor hemoglobin  8/8/2022:  Continue present medical management patient did require transfusion of 1 unit PRBC.  Continue to hold anticoagulation at present.  Additionally there is concern of recent biopsies there is air bubbles behind cecum unclear as to  whether this represents small perforation or otherwise.  Hemodynamically patient is hemoglobin remains above 7 at this point clarify when to reinitiate anticoagulation.  Iron, ferritin, B12 labs pending    Sepsis (HCC)- (present on admission)  Assessment & Plan  This is Sepsis Present on admission  SIRS criteria identified on my evaluation include: Tachycardia, with heart rate greater than 90 BPM, Tachypnea, with respirations greater than 20 per minute and Leukocytosis, with WBC greater than 12,000  Source is Suspected abdominal, possibly just gastroenteritis, symptoms of nausea, vomiting, abdominal pain. On C3 and flagyl       VTE prophylaxis: SCDs/TEDs    I have performed a physical exam and reviewed and updated ROS and Plan today (8/9/2022). In review of yesterday's note (8/8/2022), there are no changes except as documented above.

## 2022-08-10 NOTE — HOSPITAL COURSE
73 y/o man with hx of HTN, HLD, depression and generalized anxiety disorder previously on Remeron and buspirone, gout, and chronic back pain admitted 8/5/2022 with nausea, vomiting, and abdominal pain. Found to be in supraventricular tachycardia in ED with HR up to 184. Resolved after IV fluids and diltiazem. Labs significant for wbc 60143, hgb 6.9, platelets 965, bicarb 14, BUN 28, creatinine 1.47, BNP 3728, and troponin 29. CT abdomen showed large mass in cecum and ascending colon with enlarged RLQ mesenteric lymph nodes. Colonoscopy done showed significant mass burden involving ileocecal valve in addition to mass proximal to anal verge. 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 Rectal mass biopsy noting multiple fragments of tubular adenoma with high-grade dysplasia. Surgery recommending MRI on 3 Joann magnet with rectal cancer protocol for staging to guide surgical management. Oncology has been consulted and noted the different treatment plans depending on the stage, which we are unable to know at this time without this MRI. While case management and team have been working with team, palliative team was consulted to guide planning. Amidst integrating planning between teams, patient and wife decision making and considering possibilities of transfer, patient's right lower leg was noted to be swollen. Occlusive deep venous thrombosis was noted. He is now s/p IVC filter placed for it 8/12. Hemoglobin has been nearing transfusion requiring. As secondary to iron deficiency anemia in the setting of GI bleeding from colon and rectal tumors, IV iron started (to total 5 days). Pending PT eval for DC planning

## 2022-08-10 NOTE — THERAPY
Physical Therapy Contact Therapy     Patient Name: Everett Peters  Age:  72 y.o., Sex:  male  Medical Record #: [id number]  Today's Date: 8/10/2022    Attempted PT eval, pt with increased pain with all mobility, including turning. Pt very polite and pleasant and is willing to attempt eval tomorrow/next available date.    Naty Andujar, PT, DPT  [phone]

## 2022-08-10 NOTE — CARE PLAN
The patient is Stable - Low risk of patient condition declining or worsening    Shift Goals  Clinical Goals: continue supportive measures, Monitor VS, pain manaement  Patient Goals: Pain management, rest and comfort  Family Goals: marsha    Progress made toward(s) clinical / shift goals:      Problem: Pain - Standard  Goal: Alleviation of pain or a reduction in pain to the patient’s comfort goal  Outcome: Progressing    Pain managed PRN pain medication - effective    Problem: Knowledge Deficit - Standard  Goal: Patient and family/care givers will demonstrate understanding of plan of care, disease process/condition, diagnostic tests and medications  Outcome: Progressing     Patient understands plan of care.     Patient is not progressing towards the following goals:

## 2022-08-10 NOTE — PROGRESS NOTES
[facility] Internal Medicine Daily Progress Note    Date of Service  8/10/2022    [facility] Team: [facility] IM White Team   Attending: Harshal Dawson M.d.  Senior Resident: Dr. Cardona  Intern:  Dr. Zavala  Contact Number: [phone]    Chief Complaint  Nausea, vomiting, abdominal pain    Hospital Course  73 y/o man with hx of HTN, HLD, depression and generalized anxiety disorder previously on Remeron and buspirone, gout, and chronic back pain admitted 8/5/2022 with nausea, vomiting, and abdominal pain. Found to be in SVT in ED with HR up to 184. Resolved after IV fluids and diltiazem. Labs significant for wbc 67480, hgb 6.9, platelets 965, bicarb 14, BUN 28, creatinine 1.47, BNP 3728, and troponin 29. CT abdomen showed large mass in cecum and ascending colon with enlarged RLQ mesenteric lymph nodes. Colonoscopy done which showed significant mass burden involving ileocecal valve in addition to mass proximal to anal verge. Surgery recommending MRI on 3 Joann magnet with rectal cancer protocol and outpatient f/u. Biopsy showed invasive poorly differentiated adenocarcinoma and tubular adenoma with high grade dysplasia. Pending oncology, palliative, and PT eval.    Interval Problem Update  States abdominal pain has improved after bowel movements. BM has been mildly loose. Biopsy results are back, will reach out to oncology and palliative.     I have discussed this patient's plan of care and discharge plan at IDT rounds today with Case Management, Nursing, Nursing leadership, and other members of the IDT team.    Consultants/Specialty  general surgery, GI, oncology, and palliative care    Code Status  DNAR/DNI    Disposition  Patient is not medically cleared for discharge.   Anticipate discharge to to home with close outpatient follow-up.  I have placed the appropriate orders for post-discharge needs.    Review of Systems  Review of Systems   Constitutional:  Negative for chills and fever.   HENT:  Negative for ear pain and hearing loss.     Eyes:  Negative for blurred vision and pain.   Respiratory:  Negative for cough and shortness of breath.    Cardiovascular:  Negative for chest pain and palpitations.   Gastrointestinal:  Positive for abdominal pain. Negative for nausea and vomiting.   Genitourinary:  Negative for dysuria and frequency.   Musculoskeletal:  Positive for back pain.   Skin:  Negative for itching and rash.   Neurological:  Negative for dizziness and tingling.   Psychiatric/Behavioral:  The patient is nervous/anxious.       Physical Exam  Temp:  [36 °C (96.8 °F)-36.7 °C (98 °F)] 36.1 °C (97 °F)  Pulse:  [] 94  Resp:  [16-18] 16  BP: ()/(52-71) 114/60  SpO2:  [91 %-98 %] 98 %    Physical Exam  Constitutional:       Appearance: Normal appearance.   HENT:      Head: Normocephalic and atraumatic.      Nose: Nose normal.      Mouth/Throat:      Mouth: Mucous membranes are moist.      Pharynx: Oropharynx is clear.   Eyes:      Extraocular Movements: Extraocular movements intact.      Conjunctiva/sclera: Conjunctivae normal.   Cardiovascular:      Rate and Rhythm: Normal rate and regular rhythm.      Pulses: Normal pulses.      Heart sounds: Normal heart sounds.   Pulmonary:      Effort: Pulmonary effort is normal.      Breath sounds: Normal breath sounds.   Abdominal:      General: Abdomen is flat. Bowel sounds are normal.      Tenderness: There is abdominal tenderness (Diffuse but more in RLQ). There is no guarding or rebound.   Musculoskeletal:      Cervical back: Normal range of motion and neck supple.   Skin:     General: Skin is warm and dry.      Capillary Refill: Capillary refill takes less than 2 seconds.   Neurological:      General: No focal deficit present.      Mental Status: He is alert and oriented to person, place, and time.       Fluids    Intake/Output Summary (Last 24 hours) at 8/10/2022 1110  Last data filed at 8/10/2022 0600  Gross per 24 hour   Intake --   Output 1165 ml   Net -1165 ml          Laboratory  Recent Labs     08/09/22  0752 08/10/22  0143   WBC 13.3* 13.7*   RBC 3.41* 3.39*   HEMOGLOBIN 7.5* 7.3*   HEMATOCRIT 26.5* 26.1*   MCV 77.7* 77.0*   MCH 22.0* 21.5*   MCHC 28.3* 28.0*   RDW 57.4* 57.8*   PLATELETCT 557* 604*   MPV 8.2* 9.0       Recent Labs     08/09/22  0752 08/10/22  0143   SODIUM 129* 127*   POTASSIUM 3.3* 3.5*   CHLORIDE 91* 90*   CO2 24 24   GLUCOSE 95 108*   BUN 9 10   CREATININE 0.65 0.73   CALCIUM 8.8 9.0                     Imaging  EC-ECHOCARDIOGRAM COMPLETE W/O CONT   Final Result      CT-ABDOMEN-PELVIS WITH   Final Result      1.  Large cecal mass consistent with colon cancer.      2.  Metastatic lymphadenopathy in the right lower quadrant mesentery and possibly in the retroperitoneum.      3.  Small focus of extraluminal gas identified posterior to the large mass in the right lower quadrant.      4.  Intrahepatic and extra hepatic biliary duct dilatation possibly related to previous cholecystectomy and patient age. Recommend correlation with liver function tests.      5.  Nonobstructive renal stones.      6.  This was discussed with Dr. Bennett at 4:20 PM.      CT-CTA CHEST PULMONARY ARTERY W/ RECONS   Final Result         1. No CT evidence of pulmonary embolism.   2. Patchy right lower lung opacities, likely atelectasis.   3. No pleural effusion or pneumothorax.         DX-CHEST-PORTABLE (1 VIEW)   Final Result         1. No acute cardiopulmonary abnormalities are identified.      CT-CHEST,ABDOMEN,PELVIS W/O   Final Result      Limited exam due to lack of oral or IV contrast.      1. Large mass involving the cecum and ascending colon, in keeping with primary colon cancer.         2.  Multiple enlarged right lower quadrant mesenteric lymph nodes, likely metastasis. Several mildly prominent retroperitoneal lymph nodes, indeterminate.      3. Nonobstructive left renal calculus.      4. Dilated CBD and intrahepatic bile duct could relate to post cholecystectomy  status. Correlate with LFTs.      DX-CHEST-PORTABLE (1 VIEW)   Final Result         1. No acute cardiopulmonary abnormalities are identified.      MR-PELVIS-WITH & W/O AND SEQUENCES    (Results Pending)        Assessment/Plan  Problem Representation:    * Malignant neoplasm of ascending colon (HCC)- (present on admission)  Assessment & Plan  CT shows large mass that is suspected primary colonic cancer.  GI and colorectal surgery has been consulted  8/8/2022:  Biopsies showed invasive poorly differentiated adenocarcinoma of cecal mass and tubular adenoma with high grade dysplasia of rectal mass  General surgery Dr. Bennett following (pending MRI with rectal protocol for staging and surgical intervention. This will be performed on 3 Joann magnet MRI, outpatient location)  Oncology and palliative consults placed  Can advance diet    Constipation  Assessment & Plan  Related to large right-sided mass  Continue bowel protocol  Advance diet as tolerated    Pain, chronic- (present on admission)  Assessment & Plan  Scheduled and as needed breakthrough pain medications    SVT (supraventricular tachycardia) (HCC)- (present on admission)  Assessment & Plan  Resolved with IV fluid and 1 dose of IV diltiazem in the ED.  Telemetry monitoring    Elevated troponin- (present on admission)  Assessment & Plan  Likely secondary to tachyarrhythmia/SVT, no ongoing chest pain, no ST segment elevations or depressions.  Troponin trend: 29, 18, 27, 24  Repeat EKG if he develops any chest pain    MICHAEL (acute kidney injury) (HCC)- (present on admission)  Assessment & Plan  Resolved    Hypokalemia- (present on admission)  Assessment & Plan  P.o. replacement ordered, monitor    Anemia- (present on admission)  Assessment & Plan  [place]ley 2/2 chronic GI losses with large colonic mass suspected to be colon Ca  Transfuse hemoglobin less than 7  Serially monitor hemoglobin  8/8/2022:  Continue medical management, patient did require transfusion of 1  unit PRBC.  Continue to hold anticoagulation for now  Iron 19, ferritin 125, B12 1751    Sepsis (HCC)- (present on admission)  Assessment & Plan  This is Sepsis Present on admission  SIRS criteria identified on my evaluation include: Tachycardia, with heart rate greater than 90 BPM, Tachypnea, with respirations greater than 20 per minute and Leukocytosis, with WBC greater than 12,000  Source is Suspected abdominal, possibly just gastroenteritis, symptoms of nausea, vomiting, abdominal pain. On C3 and flagyl       VTE prophylaxis: SCDs/TEDs    I have performed a physical exam and reviewed and updated ROS and Plan today (8/10/2022). In review of yesterday's note (8/9/2022), there are no changes except as documented above.

## 2022-08-10 NOTE — THERAPY
"Occupational Therapy   Initial Evaluation     Patient Name: Everett Peters  Age:  72 y.o., Sex:  male  Medical Record #: [id number]  Today's Date: 8/10/2022     Precautions  Precautions: (P) Fall Risk    Assessment  Patient is 72 y.o. male who presents to [facility] for N/V and abdominal pain. PMH includes HTN, HLD, depression and anxiety disorder. Pt found to have malignant mass on ascending colon. Pt primarily limited by pain in R knee and back during session. Pt required min A for standing grooming, toileting, and functional mobility w/ FWW. Pt demo'd impaired balance, functional mobility, and activity tolerance. Will continue to follow.     Plan    Recommend Occupational Therapy 3 times per week until therapy goals are met for the following treatments:  Adaptive Equipment, Neuro Re-Education / Balance, Self Care/Activities of Daily Living, Therapeutic Activities, and Therapeutic Exercises.    DC Equipment Recommendations: (P) Tub Transfer Bench  Discharge Recommendations: (P) Recommend post-acute placement for additional occupational therapy services prior to discharge home     Subjective    \"I have fallen a few times in the past month\"     Objective       08/10/22 1631   Charge Group   OT Evaluation OT Evaluation Mod   Total Time Spent   OT Time Spent Yes   OT Evaluation (Minutes) 35   Initial Contact Note    Initial Contact Note Order Received and Verified, Occupational Therapy Evaluation in Progress with Full Report to Follow.   Prior Living Situation   Prior Services None   Housing / Facility 1 Story Apartment / Condo   Bathroom Set up Bathtub / Shower Combination   Equipment Owned None   Lives with - Patient's Self Care Capacity Spouse   Comments Pts spouse present, reports she is retired and can assist as needed. 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)   Precautions   Precautions Fall Risk   Vitals   O2 (LPM) 0   O2 Delivery Device None - Room Air   Pain 0 - 10 Group   Therapist Pain Assessment Post Activity Pain Same as Prior to Activity;Nurse Notified  (no c/o pain during session)   Cognition    Cognition / Consciousness WDL   Level of Consciousness Alert   Comments very pleasent and cooperative   Active ROM Upper Body   Active ROM Upper Body  WDL   Strength Upper Body   Upper Body Strength  WDL   Coordination Upper Body   Coordination WDL   Balance Assessment   Sitting Balance (Static) Fair   Sitting Balance (Dynamic) Fair -   Standing Balance (Static) Fair -   Standing Balance (Dynamic) Poor +   Weight Shift Sitting Good   Weight Shift Standing Fair   Comments w/ FWW   Bed Mobility    Supine to Sit Minimal Assist  (HOB raised, railing used)   Sit to Supine Moderate Assist   Scooting Minimal Assist   ADL Assessment   Grooming Supervision;Seated   Upper Body Dressing Minimal Assist   Lower Body Dressing Maximal Assist   Toileting Minimal Assist  (voided standing w/ urinal)   How much help from another person does the patient currently need...   Putting on and taking off regular lower body clothing? 2   Bathing (including washing, rinsing, and drying)? 2   Toileting, which includes using a toilet, bedpan, or urinal? 3   Putting on and taking off regular upper body clothing? 3   Taking care of personal grooming such as brushing teeth? 3   Eating meals? 4   6 Clicks Daily Activity Score 17   Functional Mobility   Sit to Stand Minimal Assist   Bed, Chair, Wheelchair Transfer Minimal Assist   Mobility bathroom distance w/ FWW   Activity Tolerance   Sitting Edge of Bed 20 min   Standing  10 min total   Patient / Family Goals   Patient / Family Goal #1 Spouse wishes for pt to get stronger   Short Term Goals   Short Term Goal # 1 Pt will demo standing grooming w/ SPV   Short Term Goal # 2 Pt will demo toilet txf w/ SPV   Short Term Goal # 3 Pt will demo LB dressing w/ SPV   Education Group   Role of Occupational Therapist Patient Response Patient;Acceptance;Explanation;Demonstration;Verbal Demonstration;Action Demonstration   Problem List   Problem List Decreased Homemaking Skills;Decreased Active Daily Living Skills;Decreased Functional Mobility;Decreased Activity Tolerance;Impaired Postural Control / Balance;Safety Awareness Deficits / Cognition   Anticipated Discharge Equipment and Recommendations   DC Equipment Recommendations Tub Transfer Bench   Discharge Recommendations Recommend post-acute placement for additional occupational therapy services prior to discharge home   Interdisciplinary Plan of Care Collaboration   IDT Collaboration with  Nursing;Physical Therapist   Patient Position at End of Therapy In Bed;Call Light within Reach;Tray Table within Reach;Phone within Reach;Family / Friend in Room   Collaboration Comments report given   Session Information   Date / Session Number  8/10, 1 (1/3, 8/16)   Priority 2

## 2022-08-10 NOTE — HOSPITAL COURSE
71 y/o man with hx of HTN, HLD, depression and generalized anxiety disorder previously on Remeron and buspirone, gout, and chronic back pain admitted 8/5/2022 with nausea, vomiting, and abdominal pain. Found to be in SVT in ED with HR up to 184. Resolved after IV fluids and diltiazem. Labs significant for wbc 05607, hgb 6.9, platelets 965, bicarb 14, BUN 28, creatinine 1.47, BNP 3728, and troponin 29. CT abdomen showed large mass in cecum and ascending colon with enlarged RLQ mesenteric lymph nodes. Colonoscopy done which showed significant mass burden involving ileocecal valve in addition to mass proximal to anal verge. Surgery recommending MRI on 3 Joann magnet with rectal cancer protocol and outpatient f/u. Biopsy showed invasive poorly differentiated adenocarcinoma and tubular adenoma with high grade dysplasia. Oncology has seen with recs given. Pending palliative eval. PT suggests post acute care placement. Now with DVT in R peroneal. IVC filter placed 8/12. Pt and wife deciding on hospice vs treatment ([facility] has accepted pt if treatment is pursued)

## 2022-08-11 ENCOUNTER — APPOINTMENT (OUTPATIENT)
Dept: RADIOLOGY | Facility: MEDICAL CENTER | Age: 72
DRG: 872 | End: 2022-08-11
Attending: STUDENT IN AN ORGANIZED HEALTH CARE EDUCATION/TRAINING PROGRAM
Payer: COMMERCIAL

## 2022-08-11 PROBLEM — E87.20 LACTIC ACID ACIDOSIS: Status: ACTIVE | Noted: 2022-08-11

## 2022-08-11 PROBLEM — I82.451 ACUTE DEEP VEIN THROMBOSIS (DVT) OF RIGHT PERONEAL VEIN (HCC): Status: ACTIVE | Noted: 2022-08-11

## 2022-08-11 LAB
ALBUMIN SERPL BCP-MCNC: 3.2 G/DL (ref 3.2–4.9)
ALBUMIN/GLOB SERPL: 0.7 G/DL
ALP SERPL-CCNC: 349 U/L (ref 30–99)
ALT SERPL-CCNC: 11 U/L (ref 2–50)
ANION GAP SERPL CALC-SCNC: 14 MMOL/L (ref 7–16)
ANISOCYTOSIS BLD QL SMEAR: ABNORMAL
AST SERPL-CCNC: 16 U/L (ref 12–45)
BACTERIA BLD CULT: NORMAL
BACTERIA BLD CULT: NORMAL
BASOPHILS # BLD AUTO: 0 % (ref 0–1.8)
BASOPHILS # BLD: 0 K/UL (ref 0–0.12)
BILIRUB SERPL-MCNC: 0.5 MG/DL (ref 0.1–1.5)
BUN SERPL-MCNC: 10 MG/DL (ref 8–22)
CALCIUM SERPL-MCNC: 9.3 MG/DL (ref 8.5–10.5)
CHLORIDE SERPL-SCNC: 91 MMOL/L (ref 96–112)
CO2 SERPL-SCNC: 24 MMOL/L (ref 20–33)
CREAT SERPL-MCNC: 0.67 MG/DL (ref 0.5–1.4)
CRP SERPL HS-MCNC: 9.79 MG/DL (ref 0–0.75)
EOSINOPHIL # BLD AUTO: 0 K/UL (ref 0–0.51)
EOSINOPHIL NFR BLD: 0 % (ref 0–6.9)
ERYTHROCYTE [DISTWIDTH] IN BLOOD BY AUTOMATED COUNT: 58.5 FL (ref 35.9–50)
ERYTHROCYTE [SEDIMENTATION RATE] IN BLOOD BY WESTERGREN METHOD: 90 MM/HOUR (ref 0–20)
GFR SERPLBLD CREATININE-BSD FMLA CKD-EPI: 99 ML/MIN/1.73 M 2
GLOBULIN SER CALC-MCNC: 4.4 G/DL (ref 1.9–3.5)
GLUCOSE SERPL-MCNC: 100 MG/DL (ref 65–99)
HCT VFR BLD AUTO: 26.7 % (ref 42–52)
HGB BLD-MCNC: 7.5 G/DL (ref 14–18)
HYPOCHROMIA BLD QL SMEAR: ABNORMAL
LACTATE SERPL-SCNC: 2.5 MMOL/L (ref 0.5–2)
LYMPHOCYTES # BLD AUTO: 0.72 K/UL (ref 1–4.8)
LYMPHOCYTES NFR BLD: 5.2 % (ref 22–41)
MANUAL DIFF BLD: NORMAL
MCH RBC QN AUTO: 21.8 PG (ref 27–33)
MCHC RBC AUTO-ENTMCNC: 28.1 G/DL (ref 33.7–35.3)
MCV RBC AUTO: 77.6 FL (ref 81.4–97.8)
MICROCYTES BLD QL SMEAR: ABNORMAL
MONOCYTES # BLD AUTO: 0.72 K/UL (ref 0–0.85)
MONOCYTES NFR BLD AUTO: 5.2 % (ref 0–13.4)
MORPHOLOGY BLD-IMP: NORMAL
MYELOCYTES NFR BLD MANUAL: 0.9 %
NEUTROPHILS # BLD AUTO: 12.24 K/UL (ref 1.82–7.42)
NEUTROPHILS NFR BLD: 88.7 % (ref 44–72)
NRBC # BLD AUTO: 0 K/UL
NRBC BLD-RTO: 0 /100 WBC
PLATELET # BLD AUTO: 550 K/UL (ref 164–446)
PLATELET BLD QL SMEAR: NORMAL
PMV BLD AUTO: 8 FL (ref 9–12.9)
POIKILOCYTOSIS BLD QL SMEAR: NORMAL
POLYCHROMASIA BLD QL SMEAR: NORMAL
POTASSIUM SERPL-SCNC: 3.5 MMOL/L (ref 3.6–5.5)
PREALB SERPL-MCNC: 7.7 MG/DL (ref 18–38)
PROCALCITONIN SERPL-MCNC: 0.58 NG/ML
PROT SERPL-MCNC: 7.6 G/DL (ref 6–8.2)
RBC # BLD AUTO: 3.44 M/UL (ref 4.7–6.1)
RBC BLD AUTO: PRESENT
SIGNIFICANT IND 70042: NORMAL
SIGNIFICANT IND 70042: NORMAL
SITE SITE: NORMAL
SITE SITE: NORMAL
SODIUM SERPL-SCNC: 129 MMOL/L (ref 135–145)
SOURCE SOURCE: NORMAL
SOURCE SOURCE: NORMAL
STOMATOCYTES BLD QL SMEAR: NORMAL
WBC # BLD AUTO: 13.8 K/UL (ref 4.8–10.8)

## 2022-08-11 PROCEDURE — 85652 RBC SED RATE AUTOMATED: CPT

## 2022-08-11 PROCEDURE — 700102 HCHG RX REV CODE 250 W/ 637 OVERRIDE(OP): Performed by: STUDENT IN AN ORGANIZED HEALTH CARE EDUCATION/TRAINING PROGRAM

## 2022-08-11 PROCEDURE — 700105 HCHG RX REV CODE 258: Performed by: INTERNAL MEDICINE

## 2022-08-11 PROCEDURE — 700111 HCHG RX REV CODE 636 W/ 250 OVERRIDE (IP): Performed by: INTERNAL MEDICINE

## 2022-08-11 PROCEDURE — 770020 HCHG ROOM/CARE - TELE (206)

## 2022-08-11 PROCEDURE — 84134 ASSAY OF PREALBUMIN: CPT

## 2022-08-11 PROCEDURE — 73564 X-RAY EXAM KNEE 4 OR MORE: CPT | Mod: RT

## 2022-08-11 PROCEDURE — 700102 HCHG RX REV CODE 250 W/ 637 OVERRIDE(OP)

## 2022-08-11 PROCEDURE — 93970 EXTREMITY STUDY: CPT | Mod: 26 | Performed by: INTERNAL MEDICINE

## 2022-08-11 PROCEDURE — A9270 NON-COVERED ITEM OR SERVICE: HCPCS | Performed by: STUDENT IN AN ORGANIZED HEALTH CARE EDUCATION/TRAINING PROGRAM

## 2022-08-11 PROCEDURE — 36415 COLL VENOUS BLD VENIPUNCTURE: CPT

## 2022-08-11 PROCEDURE — 84145 PROCALCITONIN (PCT): CPT

## 2022-08-11 PROCEDURE — 93970 EXTREMITY STUDY: CPT

## 2022-08-11 PROCEDURE — 86431 RHEUMATOID FACTOR QUANT: CPT

## 2022-08-11 PROCEDURE — 80053 COMPREHEN METABOLIC PANEL: CPT

## 2022-08-11 PROCEDURE — 86140 C-REACTIVE PROTEIN: CPT

## 2022-08-11 PROCEDURE — 99233 SBSQ HOSP IP/OBS HIGH 50: CPT | Mod: GC | Performed by: INTERNAL MEDICINE

## 2022-08-11 PROCEDURE — 85007 BL SMEAR W/DIFF WBC COUNT: CPT

## 2022-08-11 PROCEDURE — A9270 NON-COVERED ITEM OR SERVICE: HCPCS

## 2022-08-11 PROCEDURE — 85025 COMPLETE CBC W/AUTO DIFF WBC: CPT

## 2022-08-11 PROCEDURE — 700105 HCHG RX REV CODE 258: Performed by: STUDENT IN AN ORGANIZED HEALTH CARE EDUCATION/TRAINING PROGRAM

## 2022-08-11 PROCEDURE — 86038 ANTINUCLEAR ANTIBODIES: CPT

## 2022-08-11 PROCEDURE — 83605 ASSAY OF LACTIC ACID: CPT

## 2022-08-11 RX ORDER — SODIUM CHLORIDE, SODIUM LACTATE, POTASSIUM CHLORIDE, CALCIUM CHLORIDE 600; 310; 30; 20 MG/100ML; MG/100ML; MG/100ML; MG/100ML
INJECTION, SOLUTION INTRAVENOUS CONTINUOUS
Status: DISCONTINUED | OUTPATIENT
Start: 2022-08-11 | End: 2022-08-16

## 2022-08-11 RX ADMIN — METRONIDAZOLE 500 MG: 500 TABLET ORAL at 22:34

## 2022-08-11 RX ADMIN — METRONIDAZOLE 500 MG: 500 TABLET ORAL at 15:13

## 2022-08-11 RX ADMIN — CEFUROXIME AXETIL 500 MG: 500 TABLET ORAL at 04:58

## 2022-08-11 RX ADMIN — SENNOSIDES AND DOCUSATE SODIUM 2 TABLET: 50; 8.6 TABLET ORAL at 17:30

## 2022-08-11 RX ADMIN — SODIUM CHLORIDE 125 MG: 9 INJECTION, SOLUTION INTRAVENOUS at 13:38

## 2022-08-11 RX ADMIN — METOPROLOL TARTRATE 25 MG: 25 TABLET, FILM COATED ORAL at 17:30

## 2022-08-11 RX ADMIN — CEFUROXIME AXETIL 500 MG: 500 TABLET ORAL at 17:30

## 2022-08-11 RX ADMIN — OXYCODONE HYDROCHLORIDE 10 MG: 10 TABLET ORAL at 13:45

## 2022-08-11 RX ADMIN — METRONIDAZOLE 500 MG: 500 TABLET ORAL at 04:59

## 2022-08-11 RX ADMIN — OXYCODONE HYDROCHLORIDE 15 MG: 10 TABLET ORAL at 09:17

## 2022-08-11 RX ADMIN — METOPROLOL TARTRATE 25 MG: 25 TABLET, FILM COATED ORAL at 04:59

## 2022-08-11 RX ADMIN — OXYCODONE HYDROCHLORIDE 20 MG: 20 TABLET, FILM COATED, EXTENDED RELEASE ORAL at 04:59

## 2022-08-11 RX ADMIN — OXYCODONE HYDROCHLORIDE 20 MG: 20 TABLET, FILM COATED, EXTENDED RELEASE ORAL at 17:30

## 2022-08-11 RX ADMIN — OXYCODONE HYDROCHLORIDE 15 MG: 10 TABLET ORAL at 00:58

## 2022-08-11 RX ADMIN — SODIUM CHLORIDE, POTASSIUM CHLORIDE, SODIUM LACTATE AND CALCIUM CHLORIDE: 600; 310; 30; 20 INJECTION, SOLUTION INTRAVENOUS at 18:26

## 2022-08-11 ASSESSMENT — ENCOUNTER SYMPTOMS
DIZZINESS: 0
BLURRED VISION: 0
SHORTNESS OF BREATH: 0
EYE PAIN: 0
ABDOMINAL PAIN: 1
VOMITING: 0
TINGLING: 0
BACK PAIN: 1
NERVOUS/ANXIOUS: 1
COUGH: 0
CHILLS: 0
PALPITATIONS: 0
FEVER: 0
NAUSEA: 0

## 2022-08-11 ASSESSMENT — PAIN DESCRIPTION - PAIN TYPE
TYPE: ACUTE PAIN

## 2022-08-11 ASSESSMENT — FIBROSIS 4 INDEX: FIB4 SCORE: 0.62

## 2022-08-11 NOTE — PROGRESS NOTES
[facility] Internal Medicine Daily Progress Note    Date of Service  8/11/2022    [facility] Team: [facility] IM White Team   Attending: Harshal Dawson M.d.  Senior Resident: Dr. Cardona  Intern:  Dr. Zavala  Contact Number: [phone]    Chief Complaint  Nausea, vomiting, abdominal pain    Hospital Course  71 y/o man with hx of HTN, HLD, depression and generalized anxiety disorder previously on Remeron and buspirone, gout, and chronic back pain admitted 8/5/2022 with nausea, vomiting, and abdominal pain. Found to be in SVT in ED with HR up to 184. Resolved after IV fluids and diltiazem. Labs significant for wbc 19429, hgb 6.9, platelets 965, bicarb 14, BUN 28, creatinine 1.47, BNP 3728, and troponin 29. CT abdomen showed large mass in cecum and ascending colon with enlarged RLQ mesenteric lymph nodes. Colonoscopy done which showed significant mass burden involving ileocecal valve in addition to mass proximal to anal verge. Surgery recommending MRI on 3 Joann magnet with rectal cancer protocol and outpatient f/u. Biopsy showed invasive poorly differentiated adenocarcinoma and tubular adenoma with high grade dysplasia. Pending oncology, palliative, and PT eval.    Interval Problem Update  States abdominal and back pain was severe last night but controlled with medication and not moving. Notes having swollen knees (R>L) for which ice packs have helped in the past. Oncology to see pt. Updated pt and wife of new findings and current plan.    I have discussed this patient's plan of care and discharge plan at IDT rounds today with Case Management, Nursing, Nursing leadership, and other members of the IDT team.    Consultants/Specialty  general surgery, GI, oncology, and palliative care    Code Status  DNAR/DNI    Disposition  Patient is not medically cleared for discharge.   Anticipate discharge to to home with close outpatient follow-up.  I have placed the appropriate orders for post-discharge needs.    Review of Systems  Review of Systems    Constitutional:  Negative for chills and fever.   HENT:  Negative for ear pain and hearing loss.    Eyes:  Negative for blurred vision and pain.   Respiratory:  Negative for cough and shortness of breath.    Cardiovascular:  Negative for chest pain and palpitations.   Gastrointestinal:  Positive for abdominal pain. Negative for nausea and vomiting.   Genitourinary:  Negative for dysuria and frequency.   Musculoskeletal:  Positive for back pain and joint pain.   Skin:  Negative for itching and rash.   Neurological:  Negative for dizziness and tingling.   Psychiatric/Behavioral:  The patient is nervous/anxious.       Physical Exam  Temp:  [36.1 °C (97 °F)-37.5 °C (99.5 °F)] 36.9 °C (98.4 °F)  Pulse:  [80-98] 85  Resp:  [12-18] 16  BP: ()/(46-62) 107/58  SpO2:  [90 %-98 %] 90 %    Physical Exam  Constitutional:       Appearance: Normal appearance.   HENT:      Head: Normocephalic and atraumatic.      Nose: Nose normal.      Mouth/Throat:      Mouth: Mucous membranes are moist.      Pharynx: Oropharynx is clear.   Eyes:      Extraocular Movements: Extraocular movements intact.      Conjunctiva/sclera: Conjunctivae normal.   Cardiovascular:      Rate and Rhythm: Normal rate and regular rhythm.      Pulses: Normal pulses.      Heart sounds: Normal heart sounds.   Pulmonary:      Effort: Pulmonary effort is normal.      Breath sounds: Normal breath sounds.   Abdominal:      General: Abdomen is flat. Bowel sounds are normal.      Tenderness: There is abdominal tenderness (Diffuse but more in RLQ). There is no guarding or rebound.   Musculoskeletal:         General: Swelling (In knee R>L) present.      Cervical back: Normal range of motion and neck supple.      Comments: Passive ROM intact. Limited active ROM due to pain   Skin:     General: Skin is warm and dry.      Capillary Refill: Capillary refill takes less than 2 seconds.   Neurological:      General: No focal deficit present.      Mental Status: He is alert  and oriented to person, place, and time.       Fluids    Intake/Output Summary (Last 24 hours) at 8/11/2022 0859  Last data filed at 8/11/2022 0400  Gross per 24 hour   Intake --   Output 875 ml   Net -875 ml         Laboratory  Recent Labs     08/09/22  0752 08/10/22  0143 08/11/22  0621   WBC 13.3* 13.7* 13.8*   RBC 3.41* 3.39* 3.44*   HEMOGLOBIN 7.5* 7.3* 7.5*   HEMATOCRIT 26.5* 26.1* 26.7*   MCV 77.7* 77.0* 77.6*   MCH 22.0* 21.5* 21.8*   MCHC 28.3* 28.0* 28.1*   RDW 57.4* 57.8* 58.5*   PLATELETCT 557* 604* 550*   MPV 8.2* 9.0 8.0*       Recent Labs     08/09/22  0752 08/10/22  0143 08/11/22  0621   SODIUM 129* 127* 129*   POTASSIUM 3.3* 3.5* 3.5*   CHLORIDE 91* 90* 91*   CO2 24 24 24   GLUCOSE 95 108* 100*   BUN 9 10 10   CREATININE 0.65 0.73 0.67   CALCIUM 8.8 9.0 9.3                     Imaging  US-EXTREMITY VENOUS LOWER BILAT   Final Result      DX-KNEE COMPLETE 4+ RIGHT   Final Result      1.  No evidence of acute fracture or dislocation.   2.  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   3.  Moderate joint effusion.   4.  Chondrocalcinosis.         EC-ECHOCARDIOGRAM COMPLETE W/O CONT   Final Result      CT-ABDOMEN-PELVIS WITH   Final Result      1.  Large cecal mass consistent with colon cancer.      2.  Metastatic lymphadenopathy in the right lower quadrant mesentery and possibly in the retroperitoneum.      3.  Small focus of extraluminal gas identified posterior to the large mass in the right lower quadrant.      4.  Intrahepatic and extra hepatic biliary duct dilatation possibly related to previous cholecystectomy and patient age. Recommend correlation with liver function tests.      5.  Nonobstructive renal stones.      6.  This was discussed with Dr. Bennett at 4:20 PM.      CT-CTA CHEST PULMONARY ARTERY W/ RECONS   Final Result         1. No CT evidence of pulmonary embolism.   2. Patchy right lower lung opacities, likely atelectasis.   3. No pleural effusion or pneumothorax.          DX-CHEST-PORTABLE (1 VIEW)   Final Result         1. No acute cardiopulmonary abnormalities are identified.      CT-CHEST,ABDOMEN,PELVIS W/O   Final Result      Limited exam due to lack of oral or IV contrast.      1. Large mass involving the cecum and ascending colon, in keeping with primary colon cancer.         2.  Multiple enlarged right lower quadrant mesenteric lymph nodes, likely metastasis. Several mildly prominent retroperitoneal lymph nodes, indeterminate.      3. Nonobstructive left renal calculus.      4. Dilated CBD and intrahepatic bile duct could relate to post cholecystectomy status. Correlate with LFTs.      DX-CHEST-PORTABLE (1 VIEW)   Final Result         1. No acute cardiopulmonary abnormalities are identified.      MR-PELVIS-WITH & W/O AND SEQUENCES    (Results Pending)        Assessment/Plan  Problem Representation:    * Malignant neoplasm of ascending colon (HCC)- (present on admission)  Assessment & Plan  CT shows large mass that is suspected primary colonic cancer.  GI and colorectal surgery has been consulted  8/8/2022:  Biopsies showed invasive poorly differentiated adenocarcinoma of cecal mass and tubular adenoma with high grade dysplasia of rectal mass  General surgery Dr. Bennett following (pending MRI with rectal protocol for staging and surgical intervention. This will be performed on 3 Joann magnet MRI, outpatient location)  Oncology and palliative consults placed  Can advance diet  Discussed with pt and his wife   -Pt wife concerned about pt not being able to handle transportation to [place] facility   -Will need rectal mass protocol MRI for staging then surgery f/u   -Will speak with case management about possible transfer    Lactic acid acidosis  Assessment & Plan  Lactic acid 2.5, procal 0.57  Will give LR 125cc/h  F/u lactic acid    Acute deep vein thrombosis (DVT) of right peroneal vein (HCC)  Assessment & Plan  Pt with bilateral knee swelling, R>L  Doppler US showed acute,  occlusive DVT in one of the paired proximal and mid peroneal veins  Due to borderline hemoglobin and increased risk of bleeding, will defer AC  IR consulted for IVC filter placement    Constipation  Assessment & Plan  Related to large right-sided mass  Continue bowel protocol  Advance diet as tolerated    Pain, chronic- (present on admission)  Assessment & Plan  Scheduled and as needed breakthrough pain medications  R knee xray showed no evidence of acute fracture but moderate joint effusion and mild to moderate tricompartmental osteoarthritis  Continue ice packs    SVT (supraventricular tachycardia) ([facility])- (present on admission)  Assessment & Plan  Resolved with IV fluid and 1 dose of IV diltiazem in the ED.  Telemetry monitoring    Elevated troponin- (present on admission)  Assessment & Plan  Likely secondary to tachyarrhythmia/SVT, no ongoing chest pain, no ST segment elevations or depressions.  Troponin trend: 29, 18, 27, 24  Repeat EKG if he develops any chest pain    MICHAEL (acute kidney injury) ([facility])- (present on admission)  Assessment & Plan  Resolved    Hypokalemia- (present on admission)  Assessment & Plan  P.o. replacement ordered, monitor    Anemia- (present on admission)  Assessment & Plan  [place]ley 2/2 chronic GI losses with large colonic mass suspected to be colon Ca  Transfuse hemoglobin less than 7  Serially monitor hemoglobin  8/8/2022:  Continue medical management, patient did require transfusion of 1 unit PRBC.  Continue to hold anticoagulation for now  Iron 19, ferritin 125, B12 1751  Start iron infusions per oncology    Sepsis ([facility])- (present on admission)  Assessment & Plan  This is Sepsis Present on admission  SIRS criteria identified on my evaluation include: Tachycardia, with heart rate greater than 90 BPM, Tachypnea, with respirations greater than 20 per minute and Leukocytosis, with WBC greater than 12,000  Source is Suspected abdominal, possibly just gastroenteritis, symptoms of nausea,  vomiting, abdominal pain. On C3 and flagyl     VTE prophylaxis: SCDs/TEDs    I have performed a physical exam and reviewed and updated ROS and Plan today (8/11/2022). In review of yesterday's note (8/10/2022), there are no changes except as documented above.

## 2022-08-11 NOTE — DIETARY
"Nutrition services: Day 6 of admit.  Everett Peters is a 72 y.o. male with admitting DX of Sepsis     Consult received for calorie count      Assessment:  Height: 185.4 cm (6' 1\")  Weight: 86 kg (189 lb 9.5 oz)  Body mass index is 25.01 kg/m²., BMI classification: overweight  Diet/Intake: clears    Evaluation:   Pt with adenocarcinoma and tubular adenoma per biopsy. Consult for oncology and Palliative.   On nursing admit screen, pt reported poor PO PTA. No report of wt loss, though.   Pt NPO or clear liquids x 5 days. Initiation of calorie count is not appropriate at this time due to current order for clear liquids. Clear liquids are nutritionally inadequate even with good intake. If appropriate, calorie count can be initiated after diet is > clears and PO is consistently < 50%. Pt may benefit from addition of Boost Breeze supplements for additional protein.     Malnutrition Risk: criteria not met    Recommendations/Plan:  Advance beyond clears when medically feasible.   Encourage PO intake > 50%   Record intake of meals and supplements as a percentage of total intake in MAR   Monitor weight.       RD will follow.  "

## 2022-08-11 NOTE — CONSULTS
DATE OF SERVICE:  08/11/2022     INPATIENT ONCOLOGY CONSULTATION     REASON FOR CONSULTATION:  New diagnosis of synchronous colon and rectal   cancers.     HISTORY OF PRESENT ILLNESS:  The patient is a very nice 72-year-old man with a   history of hypertension, hyperlipidemia, depression, anxiety, gout, and   chronic back pain, who has now been diagnosed with concurrent or synchronous   rectal and colon cancers.  We have been asked to consult in the case.     The patient actually lives in the [facility].  His medical insurance is   through the [employer].  They are actually just visiting this area.    They visit here somewhat frequently.  He recently has been extremely weak.    He has had multiple falls and got to the point where he could even get up on   his own.  He has lost maybe 20 pounds of weight over the last several months   and has had decreased appetite.  Because of these symptoms, he was brought to   the emergency room on 8/5.  In the emergency room, he was found to have a   supraventricular tachycardia with a heart rate at around 184.  His blood work   showed a white count of 26, hemoglobin 6.9, platelets of 965 with a BUN of 28,   creatinine 1.47.  He had a noncontrasted CT scan of the chest, abdomen and   pelvis, which showed a large mass in the cecum as well as multiple right lower   quadrant mesenteric lymph nodes that were enlarged as well as some mildly   prominent retroperitoneal lymph nodes.     He was admitted for further care.  He was transfused 1 unit of blood and given   IV fluid hydration.  He had a GI consult with Dr. Jansen.  He performed a   colonoscopy on 8/7/2022.  This returned showing a 10-12 cm ulcerated mass in   the cecum.  He had a lipoma in the sigmoid colon that measured 5 cm.  There   was also a rectal mass that measured 4 cm, about 10 cm from the anal verge.    The biopsy of the cecal mass showed invasive grade III adenocarcinoma with   intact MMR.  The  biopsy of the rectal mass showed at least a tubular adenoma   with some high-grade dysplasia (although clinically the suspicion is that he   still has rectal cancer).     He had a consult with Dr. Yamila Bennett from rectal surgery.  She   recommended doing an outpatient rectal MRI for consideration of staging of the rectal tumor, and   then to consider surgical intervention.  She did order a CEA, which returned   at 4.5.     He had further staging with a CT angiogram of the chest on 8/7, which was   negative for pulmonary embolism, showed some patchy right lower lobe   infiltrate versus atelectasis, but no metastatic disease.  He underwent a   repeat CT scan of the abdomen and pelvis with contrast on 8/7, which   redemonstrated the large cecal mass.  It also showed the right lower quadrant   mesenteric lymphadenopathy measuring up to 5 cm.  There was also some   questionably enlarged retroperitoneal lymph nodes.     At this point, I have been asked to consult in the case.     PAST MEDICAL HISTORY:  1.  Hypertension.  2.  Hyperlipidemia.  3.  Depression.  4.  Anxiety.  5.  Gout.  6.  Chronic low back pain with degenerative disk disease.     PAST SURGICAL HISTORY:  1.  Cholecystectomy.  2.  Nasal surgery.     ALLERGIES:  ASPIRIN, WHICH CAUSES NAUSEA AND VOMITING.     MEDICATIONS:  Here in the hospital:  1.  Cefuroxime 500 mg p.o. q.12 hours, last dose on 8/13.  2.  Flagyl 500 mg p.o. q.8 hours, last dose 8/13.  3.  Metoprolol 25 mg b.i.d.  4.  OxyContin 20 mg q.12 hours.  5.  Bowel regimen p.r.n.  6.  Zofran p.r.n.     FAMILY HISTORY:  He has no family history of cancers.     SOCIAL HISTORY:  He was born in [place], [place].  He lives in the   [facility].  His wife's name is Maddy.  She was present for the entire   interview over the phone.  He has 2 children from a previous relationship.  He   is a retired occupational therapist.  He has a history of smoking cigarettes   for 10 years of 1 pack per  day, but quit 20 years ago.  He does not drink any   alcohol.  He does not use marijuana or any other drugs.     REVIEW OF SYSTEMS:  Positive for elements listed above including weight loss,   fatigue and global weakness.  He has trouble with frequent heartburn.  He has   had abdominal pain off and on for the past 3 weeks.  He has had some trouble   with constipation.  He has dark stools occasionally, but he says this is only   when he takes Pepto-Bismol.  He has some urinary hesitancy.  He has had some   swelling in his feet for the past several months.  He gets swelling in the   knees as well as sometimes in the joints of the hands.  He has chronic back   pain for many years.  He has neuropathy in the feet with numbness from the   knees down to the feet, which is constant for the past 11 years.  Otherwise,   complete review of systems per the AMA and CMS criteria is negative.     PHYSICAL EXAMINATION:  VITAL SIGNS:  His height is 6 feet 1 inch, his weight is 189 pounds.  His   T-max is 99.5, pulse of 85, blood pressure 107/58, respirations 16, satting   93% on room air.  GENERAL:  No acute distress, pleasant gentleman, appears stated age.  ECOG   equals 3.  Weak and fatigued.  HEENT:  NCAT.  Sclerae are anicteric.  Conjunctivae clear.  Oropharynx clear   without erythema, exudate or discharge.  NECK:  Supple, nontender, no JVP, no carotid bruits, no thyromegaly.  CHEST:  Clear to auscultation and percussion bilaterally.  No wheeze, rales or   rhonchi.  CARDIOVASCULAR:  Regular rate and rhythm.  No murmurs, gallops or rubs.    Normal S1, S2.  ABDOMEN:  Soft, nontender, somewhat distended, obese, difficult to appreciate   any masses.  No guarding or rebound.  Normoactive bowel sounds.  LYMPH NODES:  No cervical, supraclavicular, infraclavicular, axillary or   inguinal lymphadenopathy.  EXTREMITIES:  No cyanosis, clubbing or edema.  He has some swelling of the   joints of the knees, which are being iced.  He has a  global weakness of the   leg muscles.  He has some decreased range of motion related to pain.  SKIN:  No rashes, bruising, petechiae, ulcerations, or nonhealing wounds.  NEUROLOGIC:  Cranial nerves II-XII are intact.  He has intact sensation to   light touch throughout.  He moves all 4 extremities appropriately.  He has   decreased sensation, which is symmetric in the bilateral legs from the knees   down to the feet.  He has 2/5 strength of the muscles of the hips and knees,   with 3/5 strength with muscles of the ankle including dorsiflexion and   plantarflexion.     LABORATORY DATA:  White blood cell count 13.8, hemoglobin 7.5, hematocrit   26.7%, platelets 550, MCV 78 with 89% neutrophils, 5% lymphocytes, 5%   monocytes, 0% eosinophils, 0% basophils.  He had interim input sodium 129,   potassium 3.5, chloride 91, CO2 of 24, BUN 10, creatinine 0.67, glucose 100,   calcium 9.3, AST 16, ALT 11, alkaline phosphatase 349, bilirubin 0.5, albumin   3.2, protein 7.6, B12 of 1751.  TSH 3.6.  Ferritin 125.  Serum iron of 19,   TIBC 190, saturation 10% with a CEA of 4.5.     ASSESSMENT AND PLAN:  The patient is a very nice 72-year-old man with a   medical history listed above, who has now been diagnosed with a synchronous   separate malignancies including a right cecum colon cancer that was grade III   with intact MMR measuring up to 10 cm in size.  He also has a 4 cm rectal   tumor and about 10 cm from the anal verge (biopsies were nondiagnostic, but   clinically suspicious for rectal cancer).  His CEA is 4.5.  He had CT scan of   the chest, abdomen and pelvis that showed no metastatic disease, although he   does have right lower quadrant lymphadenopathy in the mesentery measuring up   to 5 cm, and some borderline enlarged right retroperitoneal lymph nodes.  We   have been asked to consult in the case.     At this point, his case is a very complex on a number of different levels.    The first level of complexity is the  fact that he has a poor performance   status, which makes treatments very difficult.  His ECOG is 3.  He likely   would not be a candidate for upfront chemotherapy with this type of   performance status.  Some of this could be based on poor nutrition, his   presenting anemia, iron deficiency, and limitations related to pain.  I think   he has had some gradual decline and debility because of his pain limitations   and is now fairly weak.     Another level of complexity is the fact that he lives in [place] actually   and not in [place].  He has insurance through [employer], and he would typically need   to see a [employer] provider for all of his treatments.  He does have a primary   care doctor in the [place] area.  I recommended that they follow up with   his doctor in [place] and get in as quickly as possible, versus going down   to an emergency room in the [place] area, in an effort to establish with an   oncologist locally, who is with the [employer] system.  They need to be seen in   the [employer] system as many of the treatments including more imaging scans and   surgery and potential adjuvant therapies could be quite expensive. They talked   about possibly staying in this area and switching insurances which could be   done, but that will likely delay his further treatments.  They could move   forward with treatment in this area, but the [employer] insurance would likely not   pay for it and they would be stuck with large bills, and there is a chance   that the next insurance would not cover it as well.  I think the best thing   would be for them to go back to their home in [place] and pursue further   treatment in that area.     His treatment is complex in the fact that he has two separate synchronous   cancers, one being a colon cancer, which looks suspicious for at least stage   III disease with lymph node involvement.  He also has a rectal tumor, which I   believe is a rectal cancer as well.  This needs to undergo  further staging   including a rectal MRI.  I think he would also benefit from a PET/CT scan   given his overall complexity, to rule out any distant metastatic disease,   which would change the approach altogether.     I talked to him and his wife about the treatment of colon cancer and rectal   cancer separately as well as in conjunction with each other.  Typically for   colon cancer, we would just surgically remove it and then consider adjuvant   chemotherapy in the postoperative setting.  For stage II or higher rectal cancer, we typically   treat with neoadjuvant radiation (plus/minus chemotherapy concurrently), and   then follow with surgical removal, and then consider further adjuvant   chemotherapy after that.  For stage I rectal cancer, one could consider upfront surgical resection followed by   assessing the need for adjuvant therapy postoperatively.  He would need the   rectal MRI to really see if this is a stage I rectal cancer or whether it   stage II or higher.     For his rectal cancer, if it is stage II or higher, I do not think he would   tolerate a prolonged course of concurrent chemotherapy and radiation over 5-6   weeks, which is the traditional therapy.  He may be a candidate for something   like short course radiation, which is given over 5 days neoadjuvantly,   followed by doing the surgery for both the rectal cancer and colon cancer   about a week later.  This may be his best overall option given his debility.    He can then hopefully make some recovery in the weeks after surgery and then   we could consider whether he would be a candidate for further adjuvant   chemotherapy after that.  Again, his case is very complex.     At this point for his iron deficiency, I would give him treatment of IV iron.    We will go ahead and order this.  Otherwise, he needs to get out of the   hospital whenever he is medically stable.  He needs to eventually see his   primary provider in the [facility] down in  [place] and get plugged in   with cancer treatment team there.     We did talk about the option of a purely palliative/comfort approach for his   care.  If somebody felt they were not strong enough and did not want to pursue any   treatments, then he could just be a comfort care patient.  This would include   going home with just hospice.  I think given his overall debility, he may only   live a couple of months.  He may be at high risk for bowel obstruction, which   may cause him to end up back in the emergency room, requiring emergent   surgery.  We talked about some of these issues.     He has trouble with lot of swollen joints.  He has some anemia of chronic   disease. I do wonder if he has some other underlying rheumatologic disorder.    All of this may just be osteoarthritis.  I think ordering an ESR, rheumatoid   factor, VENITA may be helpful.  We will order these tests as well.     At this point, I have given them a lot of information I think about.  I will   follow up with them probably tomorrow just to see if they have any other   questions.     Thank you very much referral of this very nice gentleman.        ______________________________  MD MIHAELA Khan/AMARILIS/ARIC    DD:  08/11/2022 12:00  DT:  08/11/2022 14:20    Job#:  [id number]   yes...

## 2022-08-11 NOTE — PROGRESS NOTES
Report received from [facility] shift RN, assumed patient care. Patient is calmly resting in bed, VSS, no signs of distress, even and unlabored breathing noted. Pt on room air. Tele box on and in place/medical. Plan of care discussed with patient, repeat back understanding obtained. Patient has call light within reach, fall precautions in place. Will continue to monitor.

## 2022-08-11 NOTE — THERAPY
Physical Therapy   Initial Evaluation     Patient Name: Everett Peters  Age:  72 y.o., Sex:  male  Medical Record #: [id number]  Today's Date: 8/10/2022     Precautions  Precautions: Fall Risk    Assessment  Patient is 72 y.o. male presenting for n/v and abdominal pain 2/2 ileocecal mass, found to have invasive adenocarcinoma and tubular adenoma w/ a PMH of gout, chronic back pain, HTN and HLD.  He lives w/ his wife (present throughout) who is available to assist the pt at home w/ ADL's/IADL's as needed (see flowsheet for home set up and PLOF).  The pt is currently presents w/ R knee and low back pain, and generalized weakness limiting his functional independence.  He required Sisi for LE positioning and UE leverage during bed mobility w/ HOB elevated and no rails, along w/ Sisi for extension during STS EOB w/ FWW.  The pt performed gait 15' using FWW CGA via narrow LUCAS, slow nikki, limited toe clearance, and antalgic RLE, no LOB observed and distance limited by pain and fatigue.  Recommend placement given pt's need for assistance w/ mobility tasks and high risk of future falls.  Will follow for acute PT needs.     Plan    Recommend Physical Therapy 3 times per week until therapy goals are met for the following treatments:  Bed Mobility, Community Re-integration, Equipment, Gait Training, Manual Therapy, Neuro Re-Education / Balance, Orthotics Training, Self Care/Home Evaluation, Stair Training, Therapeutic Activities, and Therapeutic Exercises    DC Equipment Recommendations: Unable to determine at this time  Discharge Recommendations: Recommend post-acute placement for additional physical therapy services prior to discharge home       Subjective/Objective       08/10/22 0878   Prior Living Situation   Prior Services Home-Independent   Housing / Facility 1 Story Apartment / Condo   Steps Into Home 6   Steps In Home 0   Equipment Owned None   Lives with - Patient's Self Care Capacity Spouse   Comments Pt lives w/ his  wife (present throughout) who is able to assist at home w/ ADLs/IADLs as needed   Prior Level of Functional Mobility   Bed Mobility Independent   Transfer Status Independent   Ambulation Independent   Distance Ambulation (Feet)   (community distances)   Assistive Devices Used None   Stairs Independent   History of Falls   History of Falls Yes   Date of Last Fall   (pt reports multiple recent falls d/t LOB, not resultant injuries)   Cognition    Cognition / Consciousness WDL   Level of Consciousness Alert   Comments pt pleasant and cooperative   Passive ROM Lower Body   Passive ROM Lower Body WDL   Active ROM Lower Body    Active ROM Lower Body  WDL   Comments observed during functional mobility   Strength Lower Body   Lower Body Strength  X   Comments generalized weakness BLE (L>R 2/2 pain)   Sensation Lower Body   Lower Extremity Sensation   WDL   Comments sensation intact to LT/DP BLE   Coordination Lower Body    Coordination Lower Body  WDL   Vision   Vision Comments pt denies any recent changes in vision   Balance Assessment   Sitting Balance (Static) Fair   Sitting Balance (Dynamic) Fair   Standing Balance (Static) Fair -   Standing Balance (Dynamic) Fair -   Weight Shift Sitting Fair   Weight Shift Standing Fair   Comments stand w/ FWW   Gait Analysis   Gait Level Of Assist Contact Guard Assist   Assistive Device Front Wheel Walker   Distance (Feet) 15   # of Times Distance was Traveled 1   Deviation Decreased Base Of Support;Bradykinetic;Decreased Toe Off;Antalgic   Weight Bearing Status no restrictions   Vision Deficits Impacting Mobility denies   Comments distance limited by weakness/fatigue   Bed Mobility    Supine to Sit Minimal Assist   Sit to Supine Minimal Assist   Scooting Minimal Assist   Comments HOB elevated, no rails   Functional Mobility   Sit to Stand Minimal Assist   Bed, Chair, Wheelchair Transfer Minimal Assist   Transfer Method Stand Step   Mobility STS eob w/ FWW, eob<>foot of bed    Activity Tolerance   Sitting Edge of Bed 20min   Standing 10min   Edema / Skin Assessment   Edema / Skin  Not Assessed   Short Term Goals    Short Term Goal # 1 Pt will demo supine<>sit eob w/ HOB flat and no rails spv in 6 visits for independence w/ bed mobility   Short Term Goal # 2 Pt will demo stand step txr eob<>chair w/ FWW spv in 6 visits for independence w/ OOB mobility.   Short Term Goal # 3 Pt will demo gait >150' using FWW spv in a moving environment in 6 visits for household ambulation.   Short Term Goal # 4 Pt will demo ability to navigate 6 stairs w/ UE support spv in 6 visits for access to his home environment.   Education Group   Education Provided Role of Physical Therapist   Role of Physical Therapist Patient Response Patient;Acceptance;Explanation;Demonstration;Action Demonstration   Additional Comments pt and spouse receptive of edu provided   Problem List    Problems Pain;Impaired Bed Mobility;Impaired Transfers;Impaired Ambulation;Functional Strength Deficit;Impaired Balance;Decreased Activity Tolerance   Anticipated Discharge Equipment and Recommendations   DC Equipment Recommendations Unable to determine at this time   Discharge Recommendations Recommend post-acute placement for additional physical therapy services prior to discharge home   Interdisciplinary Plan of Care Collaboration   IDT Collaboration with  Nursing;Occupational Therapist   Patient Position at End of Therapy Seated;Bed Alarm On;Call Light within Reach;Tray Table within Reach;Phone within Reach   Collaboration Comments regarding outcome of tx session   Session Information   Date / Session Number  8/10- 1 (1/3, 8/16)      Pt found to have CT chest findings suggestive of lung CA with metastasis to lymph nodes. Pt notably has 80 pack year smoking history.  - s/p biopsy, pending pathology results  - Per Dr. Day, Unfractionated Heparin for DVT ppx tonight 11/24  - NPO for IR chemoport placement tomorrow 11/25  - CEA 1.5 today  - Continue to f/u with Dr. Jones and Dr. Day for recs

## 2022-08-11 NOTE — CARE PLAN
The patient is Stable - Low risk of patient condition declining or worsening    Shift Goals  Clinical Goals: VS, I/O, pain  Patient Goals: rest/ comfort  Family Goals: marsha    Progress made toward(s) clinical / shift goals:  Pt a&o x 4, pain fairly well controlled. Requiring pain meds q3-4 hours. VSS, continent of urine. No BM overnight. Scattered abrasions and bruising. Did not get oob d/t pain. Pt resting in bed with call light in reach.      Problem: Pain - Standard  Goal: Alleviation of pain or a reduction in pain to the patient’s comfort goal  Outcome: Progressing     Problem: Knowledge Deficit - Standard  Goal: Patient and family/care givers will demonstrate understanding of plan of care, disease process/condition, diagnostic tests and medications  Outcome: Progressing     Problem: Fluid Volume  Goal: Fluid volume balance will be maintained  Outcome: Progressing     Problem: Skin Integrity  Goal: Skin integrity is maintained or improved  Outcome: Progressing     Problem: Fall Risk  Goal: Patient will remain free from falls  Outcome: Progressing

## 2022-08-12 ENCOUNTER — APPOINTMENT (OUTPATIENT)
Dept: RADIOLOGY | Facility: MEDICAL CENTER | Age: 72
DRG: 872 | End: 2022-08-12
Attending: STUDENT IN AN ORGANIZED HEALTH CARE EDUCATION/TRAINING PROGRAM
Payer: COMMERCIAL

## 2022-08-12 LAB
ALBUMIN SERPL BCP-MCNC: 3.1 G/DL (ref 3.2–4.9)
ALBUMIN/GLOB SERPL: 0.8 G/DL
ALP SERPL-CCNC: 281 U/L (ref 30–99)
ALT SERPL-CCNC: 8 U/L (ref 2–50)
ANION GAP SERPL CALC-SCNC: 13 MMOL/L (ref 7–16)
AST SERPL-CCNC: 12 U/L (ref 12–45)
BASOPHILS # BLD AUTO: 0.4 % (ref 0–1.8)
BASOPHILS # BLD: 0.05 K/UL (ref 0–0.12)
BILIRUB SERPL-MCNC: 0.5 MG/DL (ref 0.1–1.5)
BUN SERPL-MCNC: 10 MG/DL (ref 8–22)
CALCIUM SERPL-MCNC: 8.9 MG/DL (ref 8.5–10.5)
CHLORIDE SERPL-SCNC: 92 MMOL/L (ref 96–112)
CO2 SERPL-SCNC: 26 MMOL/L (ref 20–33)
CREAT SERPL-MCNC: 0.65 MG/DL (ref 0.5–1.4)
EOSINOPHIL # BLD AUTO: 0.11 K/UL (ref 0–0.51)
EOSINOPHIL NFR BLD: 0.9 % (ref 0–6.9)
ERYTHROCYTE [DISTWIDTH] IN BLOOD BY AUTOMATED COUNT: 59.1 FL (ref 35.9–50)
GFR SERPLBLD CREATININE-BSD FMLA CKD-EPI: 100 ML/MIN/1.73 M 2
GLOBULIN SER CALC-MCNC: 4 G/DL (ref 1.9–3.5)
GLUCOSE SERPL-MCNC: 104 MG/DL (ref 65–99)
HCT VFR BLD AUTO: 25 % (ref 42–52)
HGB BLD-MCNC: 7.2 G/DL (ref 14–18)
IMM GRANULOCYTES # BLD AUTO: 0.09 K/UL (ref 0–0.11)
IMM GRANULOCYTES NFR BLD AUTO: 0.7 % (ref 0–0.9)
INR PPP: 1.16 (ref 0.87–1.13)
LACTATE SERPL-SCNC: 1.4 MMOL/L (ref 0.5–2)
LYMPHOCYTES # BLD AUTO: 0.92 K/UL (ref 1–4.8)
LYMPHOCYTES NFR BLD: 7.5 % (ref 22–41)
MCH RBC QN AUTO: 22.2 PG (ref 27–33)
MCHC RBC AUTO-ENTMCNC: 28.8 G/DL (ref 33.7–35.3)
MCV RBC AUTO: 76.9 FL (ref 81.4–97.8)
MONOCYTES # BLD AUTO: 1.43 K/UL (ref 0–0.85)
MONOCYTES NFR BLD AUTO: 11.6 % (ref 0–13.4)
NEUTROPHILS # BLD AUTO: 9.69 K/UL (ref 1.82–7.42)
NEUTROPHILS NFR BLD: 78.9 % (ref 44–72)
NRBC # BLD AUTO: 0 K/UL
NRBC BLD-RTO: 0 /100 WBC
PLATELET # BLD AUTO: 507 K/UL (ref 164–446)
PMV BLD AUTO: 8.1 FL (ref 9–12.9)
POTASSIUM SERPL-SCNC: 3.8 MMOL/L (ref 3.6–5.5)
PROT SERPL-MCNC: 7.1 G/DL (ref 6–8.2)
PROTHROMBIN TIME: 14.6 SEC (ref 12–14.6)
RBC # BLD AUTO: 3.25 M/UL (ref 4.7–6.1)
RHEUMATOID FACT SER IA-ACNC: 20 IU/ML (ref 0–14)
SODIUM SERPL-SCNC: 131 MMOL/L (ref 135–145)
WBC # BLD AUTO: 12.3 K/UL (ref 4.8–10.8)

## 2022-08-12 PROCEDURE — 700102 HCHG RX REV CODE 250 W/ 637 OVERRIDE(OP): Performed by: STUDENT IN AN ORGANIZED HEALTH CARE EDUCATION/TRAINING PROGRAM

## 2022-08-12 PROCEDURE — 700102 HCHG RX REV CODE 250 W/ 637 OVERRIDE(OP)

## 2022-08-12 PROCEDURE — 85025 COMPLETE CBC W/AUTO DIFF WBC: CPT

## 2022-08-12 PROCEDURE — 700102 HCHG RX REV CODE 250 W/ 637 OVERRIDE(OP): Performed by: NURSE PRACTITIONER

## 2022-08-12 PROCEDURE — 700111 HCHG RX REV CODE 636 W/ 250 OVERRIDE (IP)

## 2022-08-12 PROCEDURE — 83605 ASSAY OF LACTIC ACID: CPT

## 2022-08-12 PROCEDURE — 700105 HCHG RX REV CODE 258: Performed by: INTERNAL MEDICINE

## 2022-08-12 PROCEDURE — C1880 VENA CAVA FILTER: HCPCS

## 2022-08-12 PROCEDURE — 74018 RADEX ABDOMEN 1 VIEW: CPT

## 2022-08-12 PROCEDURE — 85610 PROTHROMBIN TIME: CPT

## 2022-08-12 PROCEDURE — 770020 HCHG ROOM/CARE - TELE (206)

## 2022-08-12 PROCEDURE — 99497 ADVNCD CARE PLAN 30 MIN: CPT | Performed by: NURSE PRACTITIONER

## 2022-08-12 PROCEDURE — 06H03DZ INSERTION OF INTRALUMINAL DEVICE INTO INFERIOR VENA CAVA, PERCUTANEOUS APPROACH: ICD-10-PCS | Performed by: RADIOLOGY

## 2022-08-12 PROCEDURE — A9270 NON-COVERED ITEM OR SERVICE: HCPCS | Performed by: STUDENT IN AN ORGANIZED HEALTH CARE EDUCATION/TRAINING PROGRAM

## 2022-08-12 PROCEDURE — A9270 NON-COVERED ITEM OR SERVICE: HCPCS

## 2022-08-12 PROCEDURE — 99233 SBSQ HOSP IP/OBS HIGH 50: CPT | Mod: GC | Performed by: INTERNAL MEDICINE

## 2022-08-12 PROCEDURE — A9270 NON-COVERED ITEM OR SERVICE: HCPCS | Performed by: NURSE PRACTITIONER

## 2022-08-12 PROCEDURE — 700105 HCHG RX REV CODE 258: Performed by: STUDENT IN AN ORGANIZED HEALTH CARE EDUCATION/TRAINING PROGRAM

## 2022-08-12 PROCEDURE — 99498 ADVNCD CARE PLAN ADDL 30 MIN: CPT | Performed by: NURSE PRACTITIONER

## 2022-08-12 PROCEDURE — 80053 COMPREHEN METABOLIC PANEL: CPT

## 2022-08-12 PROCEDURE — 36415 COLL VENOUS BLD VENIPUNCTURE: CPT

## 2022-08-12 PROCEDURE — 700117 HCHG RX CONTRAST REV CODE 255: Performed by: STUDENT IN AN ORGANIZED HEALTH CARE EDUCATION/TRAINING PROGRAM

## 2022-08-12 PROCEDURE — 700111 HCHG RX REV CODE 636 W/ 250 OVERRIDE (IP): Performed by: INTERNAL MEDICINE

## 2022-08-12 RX ORDER — HYDROMORPHONE HYDROCHLORIDE 1 MG/ML
0.5 INJECTION, SOLUTION INTRAMUSCULAR; INTRAVENOUS; SUBCUTANEOUS
Status: DISCONTINUED | OUTPATIENT
Start: 2022-08-12 | End: 2022-08-13

## 2022-08-12 RX ORDER — MIDAZOLAM HYDROCHLORIDE 1 MG/ML
.5-2 INJECTION INTRAMUSCULAR; INTRAVENOUS PRN
Status: ACTIVE | OUTPATIENT
Start: 2022-08-12 | End: 2022-08-12

## 2022-08-12 RX ORDER — OXYCODONE HYDROCHLORIDE 5 MG/1
5 TABLET ORAL
Status: DISCONTINUED | OUTPATIENT
Start: 2022-08-12 | End: 2022-08-12

## 2022-08-12 RX ORDER — SODIUM CHLORIDE 9 MG/ML
500 INJECTION, SOLUTION INTRAVENOUS
Status: ACTIVE | OUTPATIENT
Start: 2022-08-12 | End: 2022-08-12

## 2022-08-12 RX ORDER — OXYCODONE HYDROCHLORIDE 10 MG/1
10 TABLET ORAL
Status: DISCONTINUED | OUTPATIENT
Start: 2022-08-12 | End: 2022-08-13

## 2022-08-12 RX ORDER — ONDANSETRON 2 MG/ML
4 INJECTION INTRAMUSCULAR; INTRAVENOUS PRN
Status: ACTIVE | OUTPATIENT
Start: 2022-08-12 | End: 2022-08-12

## 2022-08-12 RX ORDER — MIDAZOLAM HYDROCHLORIDE 1 MG/ML
INJECTION INTRAMUSCULAR; INTRAVENOUS
Status: COMPLETED
Start: 2022-08-12 | End: 2022-08-12

## 2022-08-12 RX ORDER — ALLOPURINOL 100 MG/1
100 TABLET ORAL 2 TIMES DAILY
Status: DISCONTINUED | OUTPATIENT
Start: 2022-08-12 | End: 2022-08-13

## 2022-08-12 RX ORDER — IODIXANOL 270 MG/ML
30 INJECTION, SOLUTION INTRAVASCULAR ONCE
Status: COMPLETED | OUTPATIENT
Start: 2022-08-12 | End: 2022-08-12

## 2022-08-12 RX ADMIN — OXYCODONE HYDROCHLORIDE 20 MG: 20 TABLET, FILM COATED, EXTENDED RELEASE ORAL at 17:19

## 2022-08-12 RX ADMIN — OXYCODONE HYDROCHLORIDE 10 MG: 10 TABLET ORAL at 22:13

## 2022-08-12 RX ADMIN — ALLOPURINOL 100 MG: 100 TABLET ORAL at 17:20

## 2022-08-12 RX ADMIN — MIDAZOLAM HYDROCHLORIDE 1 MG: 1 INJECTION, SOLUTION INTRAMUSCULAR; INTRAVENOUS at 13:01

## 2022-08-12 RX ADMIN — METRONIDAZOLE 500 MG: 500 TABLET ORAL at 14:47

## 2022-08-12 RX ADMIN — ALLOPURINOL 100 MG: 100 TABLET ORAL at 11:09

## 2022-08-12 RX ADMIN — CEFUROXIME AXETIL 500 MG: 500 TABLET ORAL at 06:26

## 2022-08-12 RX ADMIN — OXYCODONE HYDROCHLORIDE 10 MG: 10 TABLET ORAL at 14:47

## 2022-08-12 RX ADMIN — SODIUM CHLORIDE 125 MG: 9 INJECTION, SOLUTION INTRAVENOUS at 08:45

## 2022-08-12 RX ADMIN — METOPROLOL TARTRATE 25 MG: 25 TABLET, FILM COATED ORAL at 06:26

## 2022-08-12 RX ADMIN — IODIXANOL 30 ML: 270 INJECTION, SOLUTION INTRAVASCULAR at 13:45

## 2022-08-12 RX ADMIN — SODIUM CHLORIDE, POTASSIUM CHLORIDE, SODIUM LACTATE AND CALCIUM CHLORIDE: 600; 310; 30; 20 INJECTION, SOLUTION INTRAVENOUS at 15:03

## 2022-08-12 RX ADMIN — OXYCODONE HYDROCHLORIDE 20 MG: 20 TABLET, FILM COATED, EXTENDED RELEASE ORAL at 06:26

## 2022-08-12 RX ADMIN — OXYCODONE HYDROCHLORIDE 10 MG: 10 TABLET ORAL at 00:51

## 2022-08-12 RX ADMIN — METRONIDAZOLE 500 MG: 500 TABLET ORAL at 22:13

## 2022-08-12 RX ADMIN — METOPROLOL TARTRATE 25 MG: 25 TABLET, FILM COATED ORAL at 17:19

## 2022-08-12 RX ADMIN — FENTANYL CITRATE 50 MCG: 50 INJECTION, SOLUTION INTRAMUSCULAR; INTRAVENOUS at 13:01

## 2022-08-12 RX ADMIN — MIDAZOLAM HYDROCHLORIDE 1 MG: 1 INJECTION INTRAMUSCULAR; INTRAVENOUS at 13:01

## 2022-08-12 RX ADMIN — SODIUM CHLORIDE, POTASSIUM CHLORIDE, SODIUM LACTATE AND CALCIUM CHLORIDE: 600; 310; 30; 20 INJECTION, SOLUTION INTRAVENOUS at 03:02

## 2022-08-12 RX ADMIN — METRONIDAZOLE 500 MG: 500 TABLET ORAL at 06:26

## 2022-08-12 RX ADMIN — CEFUROXIME AXETIL 500 MG: 500 TABLET ORAL at 17:22

## 2022-08-12 ASSESSMENT — ENCOUNTER SYMPTOMS
CONSTIPATION: 1
HEARTBURN: 1
SHORTNESS OF BREATH: 0
VOMITING: 0
DIARRHEA: 0
ABDOMINAL PAIN: 1
EYE REDNESS: 0
SINUS PAIN: 0
EYE DISCHARGE: 0
TINGLING: 0
NERVOUS/ANXIOUS: 1
BACK PAIN: 1
EYE PAIN: 0
BLURRED VISION: 0
PALPITATIONS: 0
NAUSEA: 0
HEMOPTYSIS: 0
DIZZINESS: 0
CHILLS: 0
COUGH: 0
FEVER: 0
SPUTUM PRODUCTION: 0

## 2022-08-12 ASSESSMENT — PAIN DESCRIPTION - PAIN TYPE
TYPE: ACUTE PAIN
TYPE: ACUTE PAIN
TYPE: CHRONIC PAIN

## 2022-08-12 ASSESSMENT — FIBROSIS 4 INDEX
FIB4 SCORE: 0.59
FIB4 SCORE: 0.59

## 2022-08-12 NOTE — DISCHARGE PLANNING
Case Management Discharge Planning    Admission Date: 8/5/2022  GMLOS: 3.5  ALOS: 7    6-Clicks ADL Score: 17  6-Clicks Mobility Score: 15  PT and/or OT Eval ordered: NA  Post-acute Referrals Ordered: NA  Post-acute Choice Obtained: NA  Has referral(s) been sent to post-acute provider:  NA      Anticipated Discharge Dispo:  Transfer to Acute Hospital PAR [place]     DME Needed: No    Action(s) Taken: Met with pt and wife together with Dr. Dawson , Dr. Cardona and Dr. Zavala. Per [facility] team , pt needs to be transferred to [facility] for MRI and treatment. Pt needs MRI Magno 3.0 for staging of rectal cancer but per Dr. Dawson, surgeon is not PAR with insurance. And per Dr. Koenig's note, treatment and follow up needs to be with a [place] provider.     Wife and pt prefer to stay here in [place] and wants to know if there is a facility that can do the MRI. SARAH talked to MRI supervisor Candido Brito who confirmed that MRI Magno 3.0 for rectal cancer staging is available as an outpt at [facility] but he confirmed that it makes sense that if pt needs treatment and follow up with [place] providers then agreeable with transfer.      Message sent to Dr. Koenig and nurse for Dr. Bennett to call CM back so I can clarify need to transfer.    Received a message from Dr. Roshan Ruffin who is covering for Dr. Bennett. He said that none of the surgeons are PAR with [place] insurance in [place]. They can only get emergent procedure approved and pt needs preop therapy. That is why the MRI magno 3.0 is important because it is needed for staging and therefore will let the doctor decide what preop treatment is needed.    Addendum:  At 1320 CM met with wife again and explained to her that the MRI Magno 3.0 is available here in [place] however, pt needs follow up by a [place] provider so it would be best to transfer to a [place] facility. Wife asked me if they can transfer to another Medicare Managed Care program like [facility] so  CM advised her to call [facility] and Medicare to see if they can help her. Explained that this  is not an expert on this.     Wife finally agreed to transfer to [facility].     Notified Marian at [facility]. Apparently [facility] CM Gerald Toro and stated that pt can be dc home for outpt follow up  however pt is unable to walk, has a DVT. So CM asked MD Zavala to pursue transfer to [facility] and notified Gunjan at [facility] as well. CM also left VM for Gerald Toro at [facility].     Escalations Completed: Yes    Medically Clear: No    Next Steps: follow up with transfer center and with [facility]    Barriers to Discharge: Medical clearance and Pending Procedures. Possible transfer to [facility] Facility    Is the patient up for discharge tomorrow: Hopefully

## 2022-08-12 NOTE — PROGRESS NOTES
Oncology/Hematology Progress Note               Author: Alessandro Koenig M.D.    Cc: Synchronous colon and rectal cancers Date & Time created: 8/12/2022  11:09 AM     Interval History:  He is doing okay.  He has the same ongoing fatigue and weakness.  He had the ultrasound of the legs which actually showed a right distal DVT in the mid peroneal vein.  They have him set up to do an IVC filter today.      PMHx, PSurgHx, SocHx, FAMHx:  All reviewed and no changes      Review of Systems:  Review of Systems   Constitutional:  Positive for malaise/fatigue. Negative for chills and fever.   HENT:  Negative for congestion, nosebleeds and sinus pain.    Eyes:  Negative for pain, discharge and redness.   Respiratory:  Negative for cough, hemoptysis, sputum production and shortness of breath.    Cardiovascular:  Positive for leg swelling. Negative for chest pain and palpitations.   Gastrointestinal:  Positive for abdominal pain, constipation and heartburn. Negative for diarrhea, nausea and vomiting.   Genitourinary:  Positive for frequency. Negative for dysuria and urgency.   Musculoskeletal:  Positive for back pain.   Skin:  Negative for itching and rash.     Physical Exam:  Physical Exam  Vitals reviewed.   Constitutional:       General: He is not in acute distress.     Appearance: He is not toxic-appearing.   HENT:      Head: Normocephalic and atraumatic.      Mouth/Throat:      Mouth: Mucous membranes are moist.      Pharynx: Oropharynx is clear. No oropharyngeal exudate.   Eyes:      General:         Right eye: No discharge.         Left eye: No discharge.      Extraocular Movements: Extraocular movements intact.      Conjunctiva/sclera: Conjunctivae normal.   Cardiovascular:      Rate and Rhythm: Normal rate and regular rhythm.      Heart sounds: Normal heart sounds. No murmur heard.    No gallop.   Pulmonary:      Effort: Pulmonary effort is normal. No respiratory distress.      Breath sounds: Normal breath sounds. No  wheezing or rales.   Abdominal:      General: Abdomen is flat. Bowel sounds are normal. There is no distension.      Palpations: Abdomen is soft.      Tenderness: There is no abdominal tenderness. There is no guarding.   Musculoskeletal:         General: No tenderness. Normal range of motion.      Cervical back: Normal range of motion and neck supple. No rigidity or tenderness.      Right lower leg: Edema present.      Left lower leg: No edema.   Skin:     General: Skin is warm and dry.      Coloration: Skin is not jaundiced.      Findings: No bruising or lesion.   Neurological:      General: No focal deficit present.      Mental Status: He is alert and oriented to person, place, and time. Mental status is at baseline.   Psychiatric:         Mood and Affect: Mood normal.         Thought Content: Thought content normal.         Judgment: Judgment normal.       Labs:          Recent Labs     08/10/22  0143 08/11/22  0621 08/12/22  0014   SODIUM 127* 129* 131*   POTASSIUM 3.5* 3.5* 3.8   CHLORIDE 90* 91* 92*   CO2 24 24 26   BUN 10 10 10   CREATININE 0.73 0.67 0.65   MAGNESIUM 1.6  --   --    PHOSPHORUS 2.4*  --   --    CALCIUM 9.0 9.3 8.9     Recent Labs     08/10/22  0143 08/11/22  0621 08/12/22  0014   ALTSGPT 10 11 8   ASTSGOT 15 16 12   ALKPHOSPHAT 415* 349* 281*   TBILIRUBIN 0.6 0.5 0.5   PREALBUMIN  --  7.7*  --    GLUCOSE 108* 100* 104*     Recent Labs     08/10/22  0143 08/11/22  0621 08/12/22  0014   RBC 3.39* 3.44* 3.25*   HEMOGLOBIN 7.3* 7.5* 7.2*   HEMATOCRIT 26.1* 26.7* 25.0*   PLATELETCT 604* 550* 507*   PROTHROMBTM  --   --  14.6   INR  --   --  1.16*   IRON 19*  --   --    FERRITIN 125.0  --   --    TOTIRONBC 190*  --   --      Recent Labs     08/10/22  0143 08/11/22  0621 08/12/22  0014   WBC 13.7* 13.8* 12.3*   NEUTSPOLYS 77.30* 88.70* 78.90*   LYMPHOCYTES 10.70* 5.20* 7.50*   MONOCYTES 10.10 5.20 11.60   EOSINOPHILS 0.90 0.00 0.90   BASOPHILS 0.40 0.00 0.40   ASTSGOT 15 16 12   ALTSGPT 10 11 8    ALKPHOSPHAT 415* 349* 281*   TBILIRUBIN 0.6 0.5 0.5     Recent Labs     08/10/22  0143 22  0621 22  0014   SODIUM 127* 129* 131*   POTASSIUM 3.5* 3.5* 3.8   CHLORIDE 90* 91* 92*   CO2 24 24 26   GLUCOSE 108* 100* 104*   BUN 10 10 10   CREATININE 0.73 0.67 0.65   CALCIUM 9.0 9.3 8.9     Hemodynamics:  Temp (24hrs), Av.9 °C (98.4 °F), Min:36.2 °C (97.1 °F), Max:37.2 °C (99 °F)  Temperature: 37 °C (98.6 °F)  Pulse  Av.3  Min: 71  Max: 206   Blood Pressure : (!) 98/54     Respiratory:    Respiration: 16, Pulse Oximetry: 93 %           Fluids:    Intake/Output Summary (Last 24 hours) at 2022 1109  Last data filed at 2022 0853  Gross per 24 hour   Intake 360 ml   Output 1150 ml   Net -790 ml     Weight: 86 kg (189 lb 9.5 oz)  GI/Nutrition:  Orders Placed This Encounter   Procedures    Diet NPO Restrict to: Sips with Medications     Standing Status:   Standing     Number of Occurrences:   8     Order Specific Question:   Diet NPO Restrict to:     Answer:   Sips with Medications [3]     Medical Decision Making, by Problem:  Active Hospital Problems    Diagnosis     *Malignant neoplasm of ascending colon (HCC) [C18.2]     Acute deep vein thrombosis (DVT) of right peroneal vein (HCC) [I82.451]     Lactic acid acidosis [E87.2]     Anemia [D64.9]     Hypokalemia [E87.6]     MICHAEL (acute kidney injury) (HCC) [N17.9]     Elevated troponin [R77.8]     SVT (supraventricular tachycardia) (HCC) [I47.1]     Pain, chronic [G89.29]     Constipation [K59.00]     Sepsis (HCC) [A41.9]        Plan:  Colon cancer--he has been diagnosed with a 10 cm mass in the right cecum.  Biopsy showed grade 3 adenocarcinoma with intact MMR     He has been seen by Dr. Bennett of colorectal surgery.  His staging CT scan of the chest abdomen pelvis showed no metastatic disease.  He does likely have at least stage III disease, with some right lower quadrant mesenteric lymphadenopathy measuring up to 5 cm in size.  His CEA is  4.5.    --Complex case given his poor performance status  --Complex case given the fact that he has both a colon cancer as well as a separate rectal cancer  --Complex case given the fact that he resides in [place], and has [employer] insurance  --Dr. Bennett has recommended a rectal MRI to stage the rectal cancer.  Based on the staging of the rectal cancer he may be a candidate for upfront surgical resection of both tumors, followed by consideration of adjuvant therapies down the road.    2.  Rectal cancer--he had a 4 cm rectal mass which was biopsied, but nondiagnostic (although this is highly clinically suspicious for rectal cancer).    --Recommend rectal MRI for staging  --If stage I, then upfront surgical resection would be the treatment  --If stage II or higher, traditionally these patients are treated with neoadjuvant therapy prior to surgical resection.  Neoadjuvant therapy can be a long course radiation with combined chemotherapy and those who are medically fit to tolerate.  Other options in patients who are less medically fit would be a short course 5-day radiation treatment without chemotherapy in the neoadjuvant upfront setting, followed by immediate surgical resection.  --Currently poor performance status, and I do not think he would tolerate prolonged chemo and radiation as a neoadjuvant modality  --Complex case given his poor performance status  --Complex case given the fact that he has both a colon cancer as well as a separate rectal cancer  --Complex case given the fact that he resides in [place], and has [employer] insurance    3.  Right lower extremity DVT--likely related to active malignancy, and immobility.  He had significant GI bleeding which prompted his admission, and is not a good candidate for anticoagulation.  --Not a good candidate for anticoagulation  --He will have an IVC filter placed    4.  Iron deficiency anemia--this is related to GI bleeding from his colon and rectal  tumors.  --Started on IV iron Ferrlecit, which she will receive daily for a total of about 5 days.  --This should help rapidly replace his iron deficiency  --Hopefully this will allow his body to start producing red blood cells better, and limit ongoing anemia      At this point I will sign off on the case.  There is no need for active medical oncology intervention at this point in his care.  It sounds like the primary medical team will consider a transfer of care to a [facility] if possible.  If not he will need to be medically stabilized, and then sent for outpatient work-up--which might be best done in [place] through a [place] provider.  As an outpatient he would likely need a rectal MRI, a PET CT scan, and then determination of upfront surgery versus radiation.      Please call me if I can be of any other assistance or answer any other questions.            Quality-Core Measures   Reviewed items::  Radiology images reviewed, Labs reviewed and Medications reviewed  Souza catheter::  No Souza  DVT prophylaxis pharmacological::  Contraindicated - High bleeding risk

## 2022-08-12 NOTE — PROGRESS NOTES
[facility] Internal Medicine Daily Progress Note    Date of Service  8/12/2022    [facility] Team: [facility] IM White Team   Attending: Harshal Dawson M.d.  Senior Resident: Dr. Cardona  Intern:  Dr. Zavala  Contact Number: [phone]    Chief Complaint  Nausea, vomiting, abdominal pain    Hospital Course  71 y/o man with hx of HTN, HLD, depression and generalized anxiety disorder previously on Remeron and buspirone, gout, and chronic back pain admitted 8/5/2022 with nausea, vomiting, and abdominal pain. Found to be in SVT in ED with HR up to 184. Resolved after IV fluids and diltiazem. Labs significant for wbc 42578, hgb 6.9, platelets 965, bicarb 14, BUN 28, creatinine 1.47, BNP 3728, and troponin 29. CT abdomen showed large mass in cecum and ascending colon with enlarged RLQ mesenteric lymph nodes. Colonoscopy done which showed significant mass burden involving ileocecal valve in addition to mass proximal to anal verge. Surgery recommending MRI on 3 Joann magnet with rectal cancer protocol and outpatient f/u. Biopsy showed invasive poorly differentiated adenocarcinoma and tubular adenoma with high grade dysplasia. Oncology has seen with recs given. Pending palliative eval. PT suggests post acute care placement. Now with DVT in R peroneal.     Interval Problem Update  Had 7/10 back pain last night. Pending IVC filter placement today. Spoke with wife with medicine team along with case management about next steps in regards to treatment. Case management following up on transfer logistics.     I have discussed this patient's plan of care and discharge plan at IDT rounds today with Case Management, Nursing, Nursing leadership, and other members of the IDT team.    Consultants/Specialty  general surgery, GI, oncology, and palliative care    Code Status  DNAR/DNI    Disposition  Patient is not medically cleared for discharge.   Anticipate discharge to to home with close outpatient follow-up.  I have placed the appropriate orders for  post-discharge needs.    Review of Systems  Review of Systems   Constitutional:  Negative for chills and fever.   HENT:  Negative for ear pain and hearing loss.    Eyes:  Negative for blurred vision and pain.   Respiratory:  Negative for cough and shortness of breath.    Cardiovascular:  Negative for chest pain and palpitations.   Gastrointestinal:  Positive for abdominal pain. Negative for nausea and vomiting.   Genitourinary:  Negative for dysuria and frequency.   Musculoskeletal:  Positive for back pain and joint pain.   Skin:  Negative for itching and rash.   Neurological:  Negative for dizziness and tingling.   Psychiatric/Behavioral:  The patient is nervous/anxious.       Physical Exam  Temp:  [36.2 °C (97.1 °F)-37.2 °C (99 °F)] 37 °C (98.6 °F)  Pulse:  [87-96] 92  Resp:  [16-18] 16  BP: ()/(52-63) 98/54  SpO2:  [92 %-97 %] 93 %    Physical Exam  Constitutional:       Appearance: Normal appearance.   HENT:      Head: Normocephalic and atraumatic.      Nose: Nose normal.      Mouth/Throat:      Mouth: Mucous membranes are moist.      Pharynx: Oropharynx is clear.   Eyes:      Extraocular Movements: Extraocular movements intact.      Conjunctiva/sclera: Conjunctivae normal.   Cardiovascular:      Rate and Rhythm: Normal rate and regular rhythm.      Pulses: Normal pulses.      Heart sounds: Normal heart sounds.   Pulmonary:      Effort: Pulmonary effort is normal.      Breath sounds: Normal breath sounds.   Abdominal:      General: Abdomen is flat. Bowel sounds are normal.      Tenderness: There is abdominal tenderness (Diffuse but more in RLQ). There is no guarding or rebound.   Musculoskeletal:         General: Swelling (In knee R>L) present.      Cervical back: Normal range of motion and neck supple.      Comments: Passive ROM intact. Limited active ROM due to pain   Skin:     General: Skin is warm and dry.      Capillary Refill: Capillary refill takes less than 2 seconds.   Neurological:      General:  No focal deficit present.      Mental Status: He is alert and oriented to person, place, and time.       Fluids    Intake/Output Summary (Last 24 hours) at 8/12/2022 1005  Last data filed at 8/12/2022 0853  Gross per 24 hour   Intake 360 ml   Output 1150 ml   Net -790 ml         Laboratory  Recent Labs     08/10/22  0143 08/11/22 0621 08/12/22  0014   WBC 13.7* 13.8* 12.3*   RBC 3.39* 3.44* 3.25*   HEMOGLOBIN 7.3* 7.5* 7.2*   HEMATOCRIT 26.1* 26.7* 25.0*   MCV 77.0* 77.6* 76.9*   MCH 21.5* 21.8* 22.2*   MCHC 28.0* 28.1* 28.8*   RDW 57.8* 58.5* 59.1*   PLATELETCT 604* 550* 507*   MPV 9.0 8.0* 8.1*       Recent Labs     08/10/22  0143 08/11/22 0621 08/12/22  0014   SODIUM 127* 129* 131*   POTASSIUM 3.5* 3.5* 3.8   CHLORIDE 90* 91* 92*   CO2 24 24 26   GLUCOSE 108* 100* 104*   BUN 10 10 10   CREATININE 0.73 0.67 0.65   CALCIUM 9.0 9.3 8.9       Recent Labs     08/12/22  0014   INR 1.16*               Imaging  US-EXTREMITY VENOUS LOWER BILAT   Final Result      DX-KNEE COMPLETE 4+ RIGHT   Final Result      1.  No evidence of acute fracture or dislocation.   2.  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   3.  Moderate joint effusion.   4.  Chondrocalcinosis.         EC-ECHOCARDIOGRAM COMPLETE W/O CONT   Final Result      CT-ABDOMEN-PELVIS WITH   Final Result      1.  Large cecal mass consistent with colon cancer.      2.  Metastatic lymphadenopathy in the right lower quadrant mesentery and possibly in the retroperitoneum.      3.  Small focus of extraluminal gas identified posterior to the large mass in the right lower quadrant.      4.  Intrahepatic and extra hepatic biliary duct dilatation possibly related to previous cholecystectomy and patient age. Recommend correlation with liver function tests.      5.  Nonobstructive renal stones.      6.  This was discussed with Dr. Bennett at 4:20 PM.      CT-CTA CHEST PULMONARY ARTERY W/ RECONS   Final Result         1. No CT evidence of pulmonary embolism.   2.  Patchy right lower lung opacities, likely atelectasis.   3. No pleural effusion or pneumothorax.         DX-CHEST-PORTABLE (1 VIEW)   Final Result         1. No acute cardiopulmonary abnormalities are identified.      CT-CHEST,ABDOMEN,PELVIS W/O   Final Result      Limited exam due to lack of oral or IV contrast.      1. Large mass involving the cecum and ascending colon, in keeping with primary colon cancer.         2.  Multiple enlarged right lower quadrant mesenteric lymph nodes, likely metastasis. Several mildly prominent retroperitoneal lymph nodes, indeterminate.      3. Nonobstructive left renal calculus.      4. Dilated CBD and intrahepatic bile duct could relate to post cholecystectomy status. Correlate with LFTs.      DX-CHEST-PORTABLE (1 VIEW)   Final Result         1. No acute cardiopulmonary abnormalities are identified.      MR-PELVIS-WITH & W/O AND SEQUENCES    (Results Pending)   IR-INSERT IVC FILTER WITH IG & SI    (Results Pending)   [id number] VIEW    (Results Pending)        Assessment/Plan  Problem Representation:    * Malignant neoplasm of ascending colon (HCC)- (present on admission)  Assessment & Plan  CT shows large mass that is suspected primary colonic cancer.  GI and colorectal surgery has been consulted  8/8/2022:  Biopsies showed invasive poorly differentiated adenocarcinoma of cecal mass and tubular adenoma with high grade dysplasia of rectal mass  General surgery Dr. Bennett following (pending MRI with rectal protocol for staging and surgical intervention. This will be performed on 3 Joann magnet MRI, outpatient location)  Oncology following, appreciate recs  Pending palliative consult  Can advance diet  Discussed with pt and his wife   -Pt wife concerned about pt not being able to handle transportation to [facility] facility   -After further discussion, pt wife agreeable to transfer to [facility]   -Will need rectal mass protocol MRI for staging then surgery f/u   -Will call transfer  [place]    Lactic acid acidosis  Assessment & Plan  Lactic acid 2.5, procal 0.57  Will give LR 125cc/h  F/u lactic acid    Acute deep vein thrombosis (DVT) of right peroneal vein (HCC)  Assessment & Plan  Pt with bilateral knee swelling, R>L  Doppler US showed acute, occlusive DVT in one of the paired proximal and mid peroneal veins  Due to borderline hemoglobin and increased risk of bleeding, will defer AC  IR consulted for IVC filter placement   -Risks and benefits provided, pt and wife agrees with plan for IVC filter   -IVC filter planned for 8/12    Constipation  Assessment & Plan  Related to large right-sided mass  Continue bowel protocol  KUB ordered    Pain, chronic- (present on admission)  Assessment & Plan  Scheduled and as needed breakthrough pain medications  R knee xray showed no evidence of acute fracture but moderate joint effusion and mild to moderate tricompartmental osteoarthritis  Continue ice packs  Start Allopurinol 100mg bid    SVT (supraventricular tachycardia) (HCC)- (present on admission)  Assessment & Plan  Resolved with IV fluid and 1 dose of IV diltiazem in the ED  Continue Lopressor 25mg bid  Telemetry monitoring, no further episodes of SVT    Elevated troponin- (present on admission)  Assessment & Plan  Likely secondary to tachyarrhythmia/SVT, no ongoing chest pain, no ST segment elevations or depressions.  Troponin trend: 29, 18, 27, 24  Repeat EKG if he develops any chest pain    MICHAEL (acute kidney injury) (HCC)- (present on admission)  Assessment & Plan  Resolved    Hypokalemia- (present on admission)  Assessment & Plan  P.o. replacement ordered, monitor    Anemia- (present on admission)  Assessment & Plan  [place] 2/2 chronic GI losses with large colonic mass suspected to be colon Ca  Transfuse hemoglobin less than 7  Serially monitor hemoglobin  8/8/2022:  Continue medical management, patient did require transfusion of 1 unit PRBC.  Continue to hold anticoagulation for now  Iron 19,  ferritin 125, B12 1751  Start iron infusions per oncology    Sepsis (HCC)- (present on admission)  Assessment & Plan  This is Sepsis Present on admission  SIRS criteria identified on my evaluation include: Tachycardia, with heart rate greater than 90 BPM, Tachypnea, with respirations greater than 20 per minute and Leukocytosis, with WBC greater than 12,000  Source is Suspected abdominal, possibly just gastroenteritis, symptoms of nausea, vomiting, abdominal pain. On C3 and flagyl     VTE prophylaxis: SCDs/TEDs    I have performed a physical exam and reviewed and updated ROS and Plan today (8/12/2022). In review of yesterday's note (8/11/2022), there are no changes except as documented above.

## 2022-08-12 NOTE — PROGRESS NOTES
Patient brought to IR for IVC filter. Patient AAOx4, respirations even and unlabored. Dr. Wheeler at bedside with consent. Patient assisted to bed and attached to NiBP, O2, SpO2, Co2, and EKG.     1301 1 mg of Versed and 50 mcg of Fentanyl given IVP     1302 Time out done for procedure to begin    1309 Filter placed    1309 Procedure End    1312 Called report Stephanie to [id number]. Patient transport with RN at bedside.     [employer]--[employer]--IVC Filter  LOT: [id number]  REF: [id number]  EXP: 07.13.2025

## 2022-08-12 NOTE — DISCHARGE PLANNING
note:  Talked to Marian at the transfer center who said that she has not been able to reach any [facility] to initiate transfer.     CM met with pt @3627 and also wife on the phone. Explained that transfer center unable to reach [facility] at this time but will follow up tomorrow.     Wife will follow up with CM tomorrow.

## 2022-08-12 NOTE — CARE PLAN
The patient is Watcher - Medium risk of patient condition declining or worsening    Shift Goals  Clinical Goals: NPO at 0000, pain management, normalize diagnostic labs, decrease sxs of infection, IVC filter placement  Patient Goals: Pain management, rest, comfort  Family Goals: MORENO. No family present.    Progress made toward(s) clinical / shift goals:      Problem: Pain - Standard  Goal: Alleviation of pain or a reduction in pain to the patient’s comfort goal  Outcome: Progressing     Problem: Hemodynamics  Goal: Patient's hemodynamics, fluid balance and neurologic status will be stable or improve  Outcome: Progressing     Problem: Fluid Volume  Goal: Fluid volume balance will be maintained  Outcome: Progressing     Problem: Urinary - Renal Perfusion  Goal: Ability to achieve and maintain adequate renal perfusion and functioning will improve  Outcome: Progressing     Problem: Physical Regulation  Goal: Diagnostic test results will improve  Outcome: Progressing     Problem: Physical Regulation  Goal: Signs and symptoms of infection will decrease  Outcome: Progressing     Problem: Skin Integrity  Goal: Skin integrity is maintained or improved  Outcome: Progressing     Problem: Fall Risk  Goal: Patient will remain free from falls  Outcome: Progressing       Patient is not progressing towards the following goals: NA

## 2022-08-12 NOTE — ASSESSMENT & PLAN NOTE
Pt with bilateral knee swelling, R>L. Doppler US showed acute, occlusive DVT in one of the paired proximal and mid peroneal veins.     -Due to borderline hemoglobin and increased risk of bleeding, will defer AC   -IVC filter placed on 8/12

## 2022-08-12 NOTE — PROGRESS NOTES
Report received from [facility] shift RN, assumed patient care. Patient is calmly resting in bed, no signs of distress, even and unlabored breathing noted. Pt on room air. Tele box on and in place. Patient has call light within reach, fall precautions in place. Patient remains safe and stable, will continue to monitor.

## 2022-08-12 NOTE — CONSULTS
Reason for Palliative Care Consult: Advance Care Planning    Consulted by: Dr. Kanu Zavala    HPI: Everett Peters 72-year-old male past medical history of hypertension, hyperlipidemia, depression, gout, chronic back pain following injury, and generalized anxiety disorder admitted 8/5/2022 palpitations, progressive nausea, vomiting, abdominal pain, constipation, and dark stools and weight loss.  He was found to be in SVT.  Work-up has revealed synchronous separate malignancies including right cecum colon cancer grade III (biopsy 8/7/2022 revealed invasive poorly differentiated adenocarcinoma) and rectal cancer (non-diagnostic rectal mass biopsy however clinically suspicious for rectal cancer).  Patient has right lower quadrant lymphadenopathy and some borderline enlarged retroperitoneal lymph nodes.      Further work-up recommended including MRI test/3.0 for staging of rectal cancer.  The patient is from the [facility] and insurance is with [facility] making further work-up in this area difficult.  If patient pursues cancer work-up and treatment has been recommended he transfer or return to [place].  This test is available outpatient at [facility].  Patient underwent IVC filter placement 8/12/2022 due to development of right distal DVT in the mid peroneal vein.  Poor candidate for anticoagulation therapy.  [facility] Dr. Koenig consulted 8/11/2022 and options have been discussed.  Treatment is complex as he is facing 2 separate synchronous cancers.  Patient with poor performance status/ECOG 3.  Per oncology limitations could be based on poor nutrition, iron deficiency anemia, and limitations related to pain.  Per his wife he has been gradually declining since March 2022.    Additional consults:   Hematology and oncology  Colorectal surgery  Gastroenterology  Cardiac electrophysiology  General surgery    Assessment:  Neuro: Awake, alert, and oriented x4.  Dyspnea: Yes; present on exertion.  Last BM:  "08/10/22    Pain: Yes; chronic back pain related to injury.  Pain has become progressively worse since May making it a 9-10/2010 in severity which patient describes as \"a Christian experience.\"  He reports at times he felt like it was a divine act just to move from his apartment living room to the bathroom and back.  He has severe low back and abdominal pain.  He is requesting pain medication now.  He reports that oxycodone \"is not worth its weight.\"  He has no history of any substance use disorder.  I have requested with Dr. Cardona to assist with pain management.  She is agreeable.  I increased availability and frequency of oxycodone and hydromorphone.  I can see patient tomorrow for pain and symptom management consult.  Depression: Yes; positive history.  Mood appropriate for situation today.  Dementia: No       Living situation & psychosocial: Patient and his wife of 32 years María are originally from the [place] area.  They both worked in  where they met.  They had 1 child who  at age 2.  Due to this tragedy they relocated to the [facility] to be closer to family.  Patient has no familial history of cancer.  His wife however has lost most of her family members aside from 1 sister to cancer in the last 5 to 7 years.  Reports she has PTSD associated with hospitals due to the death of her child and recent bouts of caring for family members.    Patient and his wife have split time between the [facility] and [place].  They come to [place] when the weather is uncomfortably hot in [place].  They have living space in both areas.      Spiritual:  Is Scientologist or spirituality important for coping with this illness? Yes   Has a  or spiritual provider visit been requested? Yes; strong Jainism bria.  He reports he is not scared of dying but somewhat scared of the unknown.  He is asking to speak with the  privately.  His wife will go home.  Order placed and discussed with " "chaplain [place].  Sources of ed/meaning: Trudy, spending time with his wife María who he describes as \"the brains and the beauty.\"    Palliative Performance Scale: 40%    Healthcare Directive Information:  Advance Directive: None   DPOA: No; next of kin is patient's wife María Peters [phone]  POLST: None; recommended completion prior to DC once disposition is determined.     Code Status: DNR/DNI      Discussion: Met with patient and his wife María at patient's bedside. Introduced myself and explained the role of palliative care.  Patient slept during some of her visit and when  shared some background story about their family, lost to cancer, and loss of their child.  María is very insightful into her 's illness and asks me and him.  Appropriate questions.  She reports he has been visibly getting ill since March 2022.  She and he both joked about him \"going Alex Avila\" meaning he does not want to burden his wife with feeling poorly or not being able to care for himself.  Patient described this as \"humiliating.\"    They are grappling with whether to pursue disease modifying treatment versus hospice care.  They are very familiar with hospice care as they have cared for many family members through the end of their lives on María side.  María expressed she is already concerned that patient has one cancer that is stage III live alone two separate cancers to deal with.  She is very concerned with her 's ability to tolerate transfer/transport back to [place].  He is concerned with how family will cope with his illness considering the large losses the family has suffered in recent years.    María asked her  if he is considering hospice because that is what he is wants or because of barriers including insurance and burden on others.  He was unable to answer this.  I reassured them that they have further time to think about this and that we are here to " support them in their decision process and ensure we give them all the adequate information to make informed decisions.  At this time patient's wife patient if he wanted some time alone and he confirmed that he did.  Confirm that I would send a  and asked his nurse for pain medication.  They denied having questions or needs at this time.    Provided therapeutic communication including open-ended questions, therapeutic presence/silence, reflective listening, and validation of thoughts/feelings throughout encounter. Provided palliative care contact information and encouraged patient/María to call with any questions or needs.     Outcome: Patient and wife would like to hold off on discharge to [place] at this time.  They are considering hospice care in the [facility] if a reasonable option.  He did not wish to make any final decision today.    Plan: [place] back regarding serious illness planning and pain and symptom management consult tomorrow.    Updated: Dr. Cardona and BRIE Brown.    Thank you for allowing Palliative Care to participate in this patient's care. Please call our team with questions and/or additional needs.    Total visit time was 60 minutes discussing advance care planning.     Danay Acevedo A.P.R.N.  Palliative Care Nurse Practitioner  [phone]

## 2022-08-12 NOTE — PROGRESS NOTES
Palliative Care   Dr. Cardona requesting patient be prioritized as disposition heavily influenced by goals of care discussion.  Confirmed we will triage to see sometime patient today.    Danay Acevedo, CHRISTIANO.  Palliative Care Nurse Practitioner  [phone]

## 2022-08-12 NOTE — DISCHARGE PLANNING
note:  KATHLEEN FERRARI notified CM that wife has concerns about pt being transferred to a [facility] facility that is far from [place].    LVM for Gerald Toro RN CM at [facility].   [phone].

## 2022-08-12 NOTE — OR SURGEON
Immediate Post- Operative Note        Findings: DVT cAN'T aNTICOAGULATE      Procedure(s): IVC FILTER PLACEMENT WITH FLUORO      Estimated Blood Loss: Less than 5 ml        Complications: None            8/12/2022     1:11 PM     Masood Wheeler M.D.

## 2022-08-12 NOTE — DISCHARGE PLANNING
Met with pt and wife. They said that pt was independent with ADLs and IADLs. He was slowly getting weaker and progressed to unable to walk, fatigue and needed to use a walker. Pt today said he could not even ambulate to the bathroom.     Initially they were oppossed to being transferred to a [place] facility but eventually agreed. RTOC and MD notified.     Care Transition Team Assessment    Information Source  Orientation Level: Oriented X4  Information Given By: Patient, Spouse  Who is responsible for making decisions for patient? : Patient    Readmission Evaluation  Is this a readmission?: No    Elopement Risk  Legal Hold: No  Ambulatory or Self Mobile in Wheelchair: Yes  Disoriented: No  Psychiatric Symptoms: None  History of Wandering: No  Elopement this Admit: No  Vocalizing Wanting to Leave: No  Displays Behaviors, Body Language Wanting to Leave: No-Not at Risk for Elopement  Elopement Risk: Not at Risk for Elopement    Interdisciplinary Discharge Planning  Does Admitting Nurse Feel This Could be a Complex Discharge?: No  Primary Care Physician: Dr Jose Myles at [facility]  Lives with - Patient's Self Care Capacity: Spouse  Patient or legal guardian wants to designate a caregiver: No  Support Systems: Friends / Neighbors, Spouse / Significant Other  Housing / Facility: 1 Story Apartment / Condo  Do You Take your Prescribed Medications Regularly: Yes  Able to Return to Previous ADL's: Future Time w/Therapy  Mobility Issues: Yes  Prior Services: None  Patient Prefers to be Discharged to:: [facility]  Assistance Needed: Yes  Durable Medical Equipment: Walker    Discharge Preparedness  What is your plan after discharge?: Other (comment) ([facility]/facility)  What are your discharge supports?: Spouse  Prior Functional Level: Ambulatory, Independent with Activities of Daily Living, Independent with Medication Management  Difficulity with ADLs: Bathing, Dressing, Toileting, Walking  Difficulity with IADLs:  Cooking, Driving, Laundry, Shopping    Functional Assesment  Prior Functional Level: Ambulatory, Independent with Activities of Daily Living, Independent with Medication Management    Finances  Financial Barriers to Discharge: No  Prescription Coverage: Yes    Vision / Hearing Impairment  Vision Impairment : No  Hearing Impairment : No    Values / Beliefs / Concerns  Values / Beliefs Concerns : No    Advance Directive  Advance Directive?: None    Domestic Abuse  Have you ever been the victim of abuse or violence?: No  Physical Abuse or Sexual Abuse: No  Verbal Abuse or Emotional Abuse: No  Possible Abuse/Neglect Reported to:: Not Applicable         Discharge Risks or Barriers  Discharge risks or barriers?: Complex medical needs, Other (comment)  Patient risk factors: Complex medical needs    Anticipated Discharge Information  Discharge Disposition: D/T to another type of health care inst. (45)

## 2022-08-12 NOTE — DISCHARGE PLANNING
note:  Met with pt to discuss transfer to [facility]. Pt requested for CM to wait until wife comes.     Per Gunjan at RTOC, Dr. Cardona initiated transfer to [place].

## 2022-08-12 NOTE — PROGRESS NOTES
Bedside report received and patient care assumed. Pt is resting in bed, A&Ox4, with no complaints of pain, and is on RA. Tele box on. All fall precautions are in place, belongings at bedside table.  Pt was updated on POC, no questions or concerns. Pt educated on use of call light for assistance.

## 2022-08-13 LAB
ALBUMIN SERPL BCP-MCNC: 2.7 G/DL (ref 3.2–4.9)
ALBUMIN/GLOB SERPL: 0.7 G/DL
ALP SERPL-CCNC: 217 U/L (ref 30–99)
ALT SERPL-CCNC: 6 U/L (ref 2–50)
ANION GAP SERPL CALC-SCNC: 9 MMOL/L (ref 7–16)
AST SERPL-CCNC: 12 U/L (ref 12–45)
BASOPHILS # BLD AUTO: 0.5 % (ref 0–1.8)
BASOPHILS # BLD: 0.04 K/UL (ref 0–0.12)
BILIRUB SERPL-MCNC: 0.4 MG/DL (ref 0.1–1.5)
BUN SERPL-MCNC: 9 MG/DL (ref 8–22)
CALCIUM SERPL-MCNC: 8.5 MG/DL (ref 8.5–10.5)
CHLORIDE SERPL-SCNC: 98 MMOL/L (ref 96–112)
CO2 SERPL-SCNC: 25 MMOL/L (ref 20–33)
CREAT SERPL-MCNC: 0.61 MG/DL (ref 0.5–1.4)
EOSINOPHIL # BLD AUTO: 0.17 K/UL (ref 0–0.51)
EOSINOPHIL NFR BLD: 2 % (ref 0–6.9)
ERYTHROCYTE [DISTWIDTH] IN BLOOD BY AUTOMATED COUNT: 62.4 FL (ref 35.9–50)
GFR SERPLBLD CREATININE-BSD FMLA CKD-EPI: 102 ML/MIN/1.73 M 2
GLOBULIN SER CALC-MCNC: 3.7 G/DL (ref 1.9–3.5)
GLUCOSE SERPL-MCNC: 96 MG/DL (ref 65–99)
HCT VFR BLD AUTO: 25.2 % (ref 42–52)
HGB BLD-MCNC: 7.1 G/DL (ref 14–18)
IMM GRANULOCYTES # BLD AUTO: 0.04 K/UL (ref 0–0.11)
IMM GRANULOCYTES NFR BLD AUTO: 0.5 % (ref 0–0.9)
LYMPHOCYTES # BLD AUTO: 0.78 K/UL (ref 1–4.8)
LYMPHOCYTES NFR BLD: 9.1 % (ref 22–41)
MCH RBC QN AUTO: 22.2 PG (ref 27–33)
MCHC RBC AUTO-ENTMCNC: 28.2 G/DL (ref 33.7–35.3)
MCV RBC AUTO: 78.8 FL (ref 81.4–97.8)
MONOCYTES # BLD AUTO: 1.12 K/UL (ref 0–0.85)
MONOCYTES NFR BLD AUTO: 13 % (ref 0–13.4)
NEUTROPHILS # BLD AUTO: 6.44 K/UL (ref 1.82–7.42)
NEUTROPHILS NFR BLD: 74.9 % (ref 44–72)
NRBC # BLD AUTO: 0 K/UL
NRBC BLD-RTO: 0 /100 WBC
NUCLEAR IGG SER QL IA: NORMAL
PLATELET # BLD AUTO: 426 K/UL (ref 164–446)
PMV BLD AUTO: 8.2 FL (ref 9–12.9)
POTASSIUM SERPL-SCNC: 3.4 MMOL/L (ref 3.6–5.5)
PROT SERPL-MCNC: 6.4 G/DL (ref 6–8.2)
RBC # BLD AUTO: 3.2 M/UL (ref 4.7–6.1)
SODIUM SERPL-SCNC: 132 MMOL/L (ref 135–145)
WBC # BLD AUTO: 8.6 K/UL (ref 4.8–10.8)

## 2022-08-13 PROCEDURE — 700102 HCHG RX REV CODE 250 W/ 637 OVERRIDE(OP): Performed by: STUDENT IN AN ORGANIZED HEALTH CARE EDUCATION/TRAINING PROGRAM

## 2022-08-13 PROCEDURE — A9270 NON-COVERED ITEM OR SERVICE: HCPCS | Performed by: STUDENT IN AN ORGANIZED HEALTH CARE EDUCATION/TRAINING PROGRAM

## 2022-08-13 PROCEDURE — 700105 HCHG RX REV CODE 258: Performed by: STUDENT IN AN ORGANIZED HEALTH CARE EDUCATION/TRAINING PROGRAM

## 2022-08-13 PROCEDURE — 36415 COLL VENOUS BLD VENIPUNCTURE: CPT

## 2022-08-13 PROCEDURE — A9270 NON-COVERED ITEM OR SERVICE: HCPCS | Performed by: NURSE PRACTITIONER

## 2022-08-13 PROCEDURE — 700105 HCHG RX REV CODE 258: Performed by: INTERNAL MEDICINE

## 2022-08-13 PROCEDURE — 99222 1ST HOSP IP/OBS MODERATE 55: CPT | Mod: 25 | Performed by: NURSE PRACTITIONER

## 2022-08-13 PROCEDURE — 700111 HCHG RX REV CODE 636 W/ 250 OVERRIDE (IP): Performed by: NURSE PRACTITIONER

## 2022-08-13 PROCEDURE — 700111 HCHG RX REV CODE 636 W/ 250 OVERRIDE (IP): Performed by: INTERNAL MEDICINE

## 2022-08-13 PROCEDURE — 99497 ADVNCD CARE PLAN 30 MIN: CPT | Mod: 25 | Performed by: NURSE PRACTITIONER

## 2022-08-13 PROCEDURE — 80053 COMPREHEN METABOLIC PANEL: CPT

## 2022-08-13 PROCEDURE — 85025 COMPLETE CBC W/AUTO DIFF WBC: CPT

## 2022-08-13 PROCEDURE — A9270 NON-COVERED ITEM OR SERVICE: HCPCS

## 2022-08-13 PROCEDURE — 99233 SBSQ HOSP IP/OBS HIGH 50: CPT | Mod: GC | Performed by: INTERNAL MEDICINE

## 2022-08-13 PROCEDURE — 770020 HCHG ROOM/CARE - TELE (206)

## 2022-08-13 PROCEDURE — 700102 HCHG RX REV CODE 250 W/ 637 OVERRIDE(OP)

## 2022-08-13 PROCEDURE — 99498 ADVNCD CARE PLAN ADDL 30 MIN: CPT | Performed by: NURSE PRACTITIONER

## 2022-08-13 PROCEDURE — 700102 HCHG RX REV CODE 250 W/ 637 OVERRIDE(OP): Performed by: NURSE PRACTITIONER

## 2022-08-13 RX ORDER — MORPHINE SULFATE 4 MG/ML
3 INJECTION INTRAVENOUS
Status: DISCONTINUED | OUTPATIENT
Start: 2022-08-13 | End: 2022-08-16

## 2022-08-13 RX ORDER — BISACODYL 10 MG
10 SUPPOSITORY, RECTAL RECTAL
Status: DISCONTINUED | OUTPATIENT
Start: 2022-08-13 | End: 2022-08-15

## 2022-08-13 RX ORDER — MORPHINE SULFATE 30 MG/1
30 TABLET, FILM COATED, EXTENDED RELEASE ORAL EVERY 12 HOURS
Status: DISCONTINUED | OUTPATIENT
Start: 2022-08-13 | End: 2022-08-15

## 2022-08-13 RX ORDER — POLYETHYLENE GLYCOL 3350 17 G/17G
1 POWDER, FOR SOLUTION ORAL DAILY
Status: DISCONTINUED | OUTPATIENT
Start: 2022-08-14 | End: 2022-08-15

## 2022-08-13 RX ORDER — MORPHINE SULFATE 15 MG/1
7.5 TABLET ORAL
Status: DISCONTINUED | OUTPATIENT
Start: 2022-08-13 | End: 2022-08-16

## 2022-08-13 RX ORDER — ALLOPURINOL 100 MG/1
200 TABLET ORAL 2 TIMES DAILY
Status: DISCONTINUED | OUTPATIENT
Start: 2022-08-13 | End: 2022-08-17 | Stop reason: HOSPADM

## 2022-08-13 RX ORDER — AMOXICILLIN 250 MG
2 CAPSULE ORAL 2 TIMES DAILY
Status: DISCONTINUED | OUTPATIENT
Start: 2022-08-14 | End: 2022-08-15

## 2022-08-13 RX ORDER — MORPHINE SULFATE 15 MG/1
15 TABLET ORAL
Status: DISCONTINUED | OUTPATIENT
Start: 2022-08-13 | End: 2022-08-16

## 2022-08-13 RX ADMIN — MORPHINE SULFATE 30 MG: 30 TABLET, FILM COATED, EXTENDED RELEASE ORAL at 17:47

## 2022-08-13 RX ADMIN — METRONIDAZOLE 500 MG: 500 TABLET ORAL at 06:23

## 2022-08-13 RX ADMIN — ALLOPURINOL 200 MG: 100 TABLET ORAL at 17:47

## 2022-08-13 RX ADMIN — METOPROLOL TARTRATE 25 MG: 25 TABLET, FILM COATED ORAL at 17:47

## 2022-08-13 RX ADMIN — HYDROMORPHONE HYDROCHLORIDE 0.5 MG: 1 INJECTION, SOLUTION INTRAMUSCULAR; INTRAVENOUS; SUBCUTANEOUS at 15:47

## 2022-08-13 RX ADMIN — SODIUM CHLORIDE, POTASSIUM CHLORIDE, SODIUM LACTATE AND CALCIUM CHLORIDE: 600; 310; 30; 20 INJECTION, SOLUTION INTRAVENOUS at 01:26

## 2022-08-13 RX ADMIN — SENNOSIDES AND DOCUSATE SODIUM 2 TABLET: 50; 8.6 TABLET ORAL at 06:23

## 2022-08-13 RX ADMIN — MORPHINE SULFATE 15 MG: 15 TABLET ORAL at 21:28

## 2022-08-13 RX ADMIN — SENNOSIDES AND DOCUSATE SODIUM 2 TABLET: 50; 8.6 TABLET ORAL at 17:47

## 2022-08-13 RX ADMIN — POLYETHYLENE GLYCOL 3350 1 PACKET: 17 POWDER, FOR SOLUTION ORAL at 13:09

## 2022-08-13 RX ADMIN — ALLOPURINOL 100 MG: 100 TABLET ORAL at 06:23

## 2022-08-13 RX ADMIN — OXYCODONE HYDROCHLORIDE 20 MG: 20 TABLET, FILM COATED, EXTENDED RELEASE ORAL at 06:23

## 2022-08-13 RX ADMIN — SODIUM CHLORIDE 250 MG: 9 INJECTION, SOLUTION INTRAVENOUS at 08:58

## 2022-08-13 RX ADMIN — METOPROLOL TARTRATE 25 MG: 25 TABLET, FILM COATED ORAL at 06:23

## 2022-08-13 RX ADMIN — SODIUM CHLORIDE, POTASSIUM CHLORIDE, SODIUM LACTATE AND CALCIUM CHLORIDE: 600; 310; 30; 20 INJECTION, SOLUTION INTRAVENOUS at 17:05

## 2022-08-13 RX ADMIN — OXYCODONE HYDROCHLORIDE 15 MG: 10 TABLET ORAL at 13:08

## 2022-08-13 RX ADMIN — MORPHINE SULFATE 7.5 MG: 15 TABLET ORAL at 17:05

## 2022-08-13 RX ADMIN — OXYCODONE HYDROCHLORIDE 10 MG: 10 TABLET ORAL at 03:40

## 2022-08-13 RX ADMIN — CEFUROXIME AXETIL 500 MG: 500 TABLET ORAL at 06:23

## 2022-08-13 ASSESSMENT — ENCOUNTER SYMPTOMS
VOMITING: 0
CHILLS: 0
INSOMNIA: 1
FEVER: 0
DEPRESSION: 1
PALPITATIONS: 0
WEAKNESS: 1
TINGLING: 0
EYE PAIN: 0
NAUSEA: 0
SORE THROAT: 0
SHORTNESS OF BREATH: 0
EYES NEGATIVE: 1
ABDOMINAL PAIN: 1
DIZZINESS: 0
CONSTIPATION: 1
COUGH: 0
BACK PAIN: 1
NERVOUS/ANXIOUS: 1
WEIGHT LOSS: 1
BLURRED VISION: 0

## 2022-08-13 ASSESSMENT — PAIN DESCRIPTION - PAIN TYPE
TYPE: ACUTE PAIN
TYPE: CHRONIC PAIN

## 2022-08-13 ASSESSMENT — FIBROSIS 4 INDEX: FIB4 SCORE: 0.59

## 2022-08-13 NOTE — PROGRESS NOTES
Report received from [facility] shift RN, assumed patient care. Patient is calmly resting in bed, no signs of distress, even and unlabored breathing noted. Pt on room air. Tele box on and in place. Patient has call light within reach, fall precautions in place. Patient remains safe and stable, will continue to monitor.

## 2022-08-13 NOTE — CARE PLAN
The patient is Stable - Low risk of patient condition declining or worsening    Shift Goals  Clinical Goals: pain management, post op vitals, monitor site from filter insertion  Patient Goals: pain control  Family Goals: MORENO. No family present.    Progress made toward(s) clinical / shift goals:    Problem: Knowledge Deficit - Standard  Goal: Patient and family/care givers will demonstrate understanding of plan of care, disease process/condition, diagnostic tests and medications  Outcome: Progressing  Note: Patient ask questions regarding his plan of care     Problem: Fall Risk  Goal: Patient will remain free from falls  Outcome: Progressing  Note: Fall precautions in place       Patient is not progressing towards the following goals:      Problem: Pain - Standard  Goal: Alleviation of pain or a reduction in pain to the patient’s comfort goal  Outcome: Not Progressing  Note: Patient has chronic pain. Patient medicated per MAR

## 2022-08-13 NOTE — PROGRESS NOTES
Spiritual Care Note      Patient's Name: Everett Peters   MRN: [id number]    YOB: 1950   Age and Gender: 72 y.o. male   Unit/Service Area: [facility]   Room (and Bed): T718[facility]ly named [facility]   Ethnicity or Nationality:     Primary Language: English   Methodist/Spiritual Preference: Taoism  (Cedric Darling)   Place of Residence: [place], [place]   Medical Diagnoses: malignant neoplasm of ascending colon   lactic acid acidosis  acute DVT of right peroneal vein   constipation  pain, chronic  supraventricular tachycardia  elevated troponin  MICHAEL   hypokalemia  anemia  sepsis    Code Status: DNAR/DNI    Date of Admission: 8/5/2022   Length of Stay: 7 days        Spiritual Screen Results  Is your spiritual health/inner well-being important to you as you cope with your medical?   Yes  Would you like a visit from our Spiritual Care team or your own Yazdanism or spiritual leader?   Yes      Nature of the Visit:   Initial, On shift    Continue Visiting:   Yes    General Visit:   Yes    Referral from/Origin of Request:   [facility] palliative care nursing order    Visited with:   Patient    Observations/Symptoms:   Sitting up in bed, alert, pleasant.    Interaction/Conversation:   Patient shared the many ways his trudy has sustained over the years and how it is helping him now.    Assessment:   Need    Need:   Seeking Spiritual Assistance and Support    Methodist Needs:   Reflection, Prayer    Intended Effects:   Demonstrate Caring and Concern, Establish Rapport and Connectedness, Trudy Affirmation    Interventions:   Compassionate Presence, Reflective Listening Emotional Support, Theological Reflection, Prayer    Outcomes:   Emotional Release, Connectedness with God, Ability to Communicate with Truth and Honesty, Spiritual Comfort, Moving from Powerlessness to Empowerment, Progress with Trust    Plan:   Continue to follow    Total Time:   20 minutes      Spiritual Care Provider   Chaplain VISHNU Edwards  [phone]    [email or web address]

## 2022-08-13 NOTE — CONSULTS
Date & Time note created:    8/13/2022   4:23 PM       Date of Consult: 8/13/2022    Requesting Provider: Harshal Dawson M.D.  Consulting Provider: [facility]  Reason for Consult: Non-chronic pain management    Chief Complaint: Abdominal pain  HPI:   Everett Peters is a 72 y.o. male with past medical history of hypertension, hyperlipidemia, depression, generalized anxiety disorder, gout, and chronic back pain following an injury recently diagnosed with synchronous separate malignancies including right cecum colon cancer stage III and rectal cancer.  Patient has been seen by surgery, cardiac electrophysiology for SVT on admission, GI, colorectal surgery, hematology and oncology, and palliative care.  Patient and his wife split time between [place] and [place] and health insurance is [employer] advantage.     Patient has chronic low back pain from breaking [employer] in his early 20s.  He suffered significant injury and has been on chronic opioids.  He used to take Percocet 7.5-325 upwards of 8 tablets a day to control his pain however he was weaned down over the years.    Cancer related pain history:  Onset: 3 months or more  Location: Right sided abdomen  Duration: Constant with sudden sharp flares that are difficult to control  Characteristics: Sharp, stabbing  Aggravating factors: None  Alleviating factors: Medication temporarily  Radiation: Non radiating  Treatments: Patient does not feel OxyContin works well; he had some IV morphine when he first came into the emergency department which did not work well however he did not trial any orals.  Severity: At its worst 9-10/10    History reviewed. No pertinent past medical history.  Past Surgical History:   Procedure Laterality Date    ID COLONOSCOPY,DIAGNOSTIC N/A 8/7/2022    Procedure: COLONOSCOPY;  Surgeon: Ruddy Jansen M.D.;  Location: SURGERY [facility];  Service: Gastroenterology    ID COLONOSCOPY,BIOPSY N/A 8/7/2022    Procedure:  COLONOSCOPY, WITH BIOPSY;  Surgeon: Ruddy Jansen M.D.;  Location: SURGERY [facility];  Service: Gastroenterology    VT COLONOSCPY,FLEX,W/DIR SUBMUC INJECT N/A 8/7/2022    Procedure: TATTOOING, WITH COLONOSCOPY;  Surgeon: Ruddy Jansen M.D.;  Location: SURGERY [facility];  Service: Gastroenterology     Current Medications:  No current facility-administered medications on file prior to encounter.     Current Outpatient Medications on File Prior to Encounter   Medication Sig Dispense Refill    allopurinol (ZYLOPRIM) 300 MG Tab Take 300 mg by mouth 2 times a day.      busPIRone (BUSPAR) 10 MG Tab tablet Take 2.5 mg by mouth every day. 2.5 mg = 1/4 tablet      chlorthalidone (HYGROTON) 25 MG Tab Take 25 mg by mouth every day.      omeprazole (PRILOSEC) 20 MG delayed-release capsule Take 20 mg by mouth every day.      oxyCODONE-acetaminophen (PERCOCET) 7.5-325 MG per tablet Take 1 Tablet by mouth every 6 hours as needed.       Medication Allergy/Sensitivities:  Allergies   Allergen Reactions    Aspirin Nausea and Unspecified     Stomach upset (pain)     Family: No family history of cancer.    Social History: Patient is .  He has no living children.     Living situation: Patient and his wife split time between [place] and [place] however have been planning to relocate to [place].    Palliative Performance Scale: 40%    Spiritual History: Strong [employer] bria.  Seen by  Steve yesterday.    ROS:    Review of Systems   Constitutional:  Positive for malaise/fatigue and weight loss.   HENT:  Negative for sore throat.    Eyes: Negative.    Respiratory:  Negative for cough and shortness of breath.    Cardiovascular:  Positive for leg swelling (improving). Negative for chest pain.   Gastrointestinal:  Positive for abdominal pain and constipation.   Genitourinary: Negative.    Musculoskeletal:  Positive for back pain and joint pain.   Neurological:  Positive for weakness.   Endo/Heme/Allergies:          "Pallor   Psychiatric/Behavioral:  Positive for depression. Negative for suicidal ideas. The patient is nervous/anxious and has insomnia (related to pain).      PE:   Recent vital signs  Weight/BMI: Body mass index is 28.72 kg/m².  BP (!) 97/56   Pulse 86   Temp 36.3 °C (97.3 °F) (Temporal)   Resp 18   Ht 1.803 m (5' 11\")   Wt 93.4 kg (205 lb 14.6 oz)   SpO2 95%   BMI 28.72 kg/m²   Vitals:    08/13/22 0316 08/13/22 0623 08/13/22 0853 08/13/22 1141   BP: 136/72 119/62 (!) 97/45 (!) 97/56   Pulse: 65 92 84 86   Resp: 18  18 18   Temp: 35.8 °C (96.5 °F)  36.6 °C (97.8 °F) 36.3 °C (97.3 °F)   TempSrc: Temporal  Temporal Temporal   SpO2:   95%    Weight: 93.4 kg (205 lb 14.6 oz)      Height:         Oxygen Therapy:  Pulse Oximetry: 95 %  Physical Exam  Vitals and nursing note reviewed.   Constitutional:       General: He is awake. He is not in acute distress.  HENT:      Mouth/Throat:      Mouth: Mucous membranes are moist.      Pharynx: Oropharynx is clear.   Eyes:      Pupils: Pupils are equal, round, and reactive to light.   Cardiovascular:      Rate and Rhythm: Normal rate and regular rhythm.   Pulmonary:      Effort: Pulmonary effort is normal. No respiratory distress.      Breath sounds: Normal breath sounds. No wheezing.   Abdominal:      General: There is distension.      Palpations: Abdomen is soft.      Tenderness: There is abdominal tenderness in the right upper quadrant and right lower quadrant.   Musculoskeletal:         General: Swelling (bilareal knees R>L) present.      Right lower leg: Edema (trace) present.      Left lower leg: Edema (trace) present.   Skin:     Capillary Refill: Capillary refill takes less than 2 seconds.      Coloration: Skin is pale.   Neurological:      General: No focal deficit present.      Mental Status: He is alert. Mental status is at baseline.      Comments: Oriented x 4   Psychiatric:         Mood and Affect: Mood normal.         Behavior: Behavior normal. Behavior is " cooperative.         Thought Content: Thought content normal.         Judgment: Judgment normal.     Lab Data Review:  Recent Results (from the past 24 hour(s))   CBC WITH DIFFERENTIAL    Collection Time: 08/13/22  5:25 AM   Result Value Ref Range    WBC 8.6 4.8 - 10.8 K/uL    RBC 3.20 (L) 4.70 - 6.10 M/uL    Hemoglobin 7.1 (L) 14.0 - 18.0 g/dL    Hematocrit 25.2 (L) 42.0 - 52.0 %    MCV 78.8 (L) 81.4 - 97.8 fL    MCH 22.2 (L) 27.0 - 33.0 pg    MCHC 28.2 (L) 33.7 - 35.3 g/dL    RDW 62.4 (H) 35.9 - 50.0 fL    Platelet Count 426 164 - 446 K/uL    MPV 8.2 (L) 9.0 - 12.9 fL    Neutrophils-Polys 74.90 (H) 44.00 - 72.00 %    Lymphocytes 9.10 (L) 22.00 - 41.00 %    Monocytes 13.00 0.00 - 13.40 %    Eosinophils 2.00 0.00 - 6.90 %    Basophils 0.50 0.00 - 1.80 %    Immature Granulocytes 0.50 0.00 - 0.90 %    Nucleated RBC 0.00 /100 WBC    Neutrophils (Absolute) 6.44 1.82 - 7.42 K/uL    Lymphs (Absolute) 0.78 (L) 1.00 - 4.80 K/uL    Monos (Absolute) 1.12 (H) 0.00 - 0.85 K/uL    Eos (Absolute) 0.17 0.00 - 0.51 K/uL    Baso (Absolute) 0.04 0.00 - 0.12 K/uL    Immature Granulocytes (abs) 0.04 0.00 - 0.11 K/uL    NRBC (Absolute) 0.00 K/uL   Comp Metabolic Panel    Collection Time: 08/13/22  5:25 AM   Result Value Ref Range    Sodium 132 (L) 135 - 145 mmol/L    Potassium 3.4 (L) 3.6 - 5.5 mmol/L    Chloride 98 96 - 112 mmol/L    Co2 25 20 - 33 mmol/L    Anion Gap 9.0 7.0 - 16.0    Glucose 96 65 - 99 mg/dL    Bun 9 8 - 22 mg/dL    Creatinine 0.61 0.50 - 1.40 mg/dL    Calcium 8.5 8.5 - 10.5 mg/dL    AST(SGOT) 12 12 - 45 U/L    ALT(SGPT) 6 2 - 50 U/L    Alkaline Phosphatase 217 (H) 30 - 99 U/L    Total Bilirubin 0.4 0.1 - 1.5 mg/dL    Albumin 2.7 (L) 3.2 - 4.9 g/dL    Total Protein 6.4 6.0 - 8.2 g/dL    Globulin 3.7 (H) 1.9 - 3.5 g/dL    A-G Ratio 0.7 g/dL   ESTIMATED GFR    Collection Time: 08/13/22  5:25 AM   Result Value Ref Range    GFR (CKD-EPI) 102 >60 mL/min/1.73 m 2       Imaging/Procedures Review:    [id number] VIEW    Final Result 8/12/22      Normal bowel gas pattern with a small colonic stool burden.      IR-INSERT IVC FILTER WITH IG & SI   Final Result 8/12/22         1. ULTRASOUND AND FLUOROSCOPIC GUIDED PLACEMENT OF A OPTION ELITE CAVAL FILTER DEPLOYED IN THE INFRARENAL IVC.      2. INFERIOR VENA CAVAGRAM WITHIN NORMAL LIMITS WITH NO EVIDENCE OF CAVAL THROMBUS OR OCCLUSION.      US-EXTREMITY VENOUS LOWER BILAT   Final Result 8/12/22      DX-KNEE COMPLETE 4+ RIGHT   Final Result 8/11/22      1.  No evidence of acute fracture or dislocation.   2.  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   3.  Moderate joint effusion.   4.  Chondrocalcinosis.         EC-ECHOCARDIOGRAM COMPLETE W/O CONT   Final Result 8/8/22      CT-ABDOMEN-PELVIS WITH   Final Result 8/7/22      1.  Large cecal mass consistent with colon cancer.      2.  Metastatic lymphadenopathy in the right lower quadrant mesentery and possibly in the retroperitoneum.      3.  Small focus of extraluminal gas identified posterior to the large mass in the right lower quadrant.      4.  Intrahepatic and extra hepatic biliary duct dilatation possibly related to previous cholecystectomy and patient age. Recommend correlation with liver function tests.      5.  Nonobstructive renal stones.      6.  This was discussed with Dr. Bennett at 4:20 PM.      CT-CTA CHEST PULMONARY ARTERY W/ RECONS   Final Result 8/7/22         1. No CT evidence of pulmonary embolism.   2. Patchy right lower lung opacities, likely atelectasis.   3. No pleural effusion or pneumothorax.         DX-CHEST-PORTABLE (1 VIEW)   Final Result 8/7/22         1. No acute cardiopulmonary abnormalities are identified.      CT-CHEST,ABDOMEN,PELVIS W/O   Final Result 8/7/22      Limited exam due to lack of oral or IV contrast.      1. Large mass involving the cecum and ascending colon, in keeping with primary colon cancer.         2.  Multiple enlarged right lower quadrant mesenteric lymph nodes, likely  metastasis. Several mildly prominent retroperitoneal lymph nodes, indeterminate.      3. Nonobstructive left renal calculus.      4. Dilated CBD and intrahepatic bile duct could relate to post cholecystectomy status. Correlate with LFTs.      DX-CHEST-PORTABLE (1 VIEW)   Final Result 8/7/22         1. No acute cardiopulmonary abnormalities are identified.      MR-PELVIS-WITH & W/O AND SEQUENCES    (Results Pending)        ASSESSMENT/PLAN WITH SHARED DECISION MAKING:   #Synchronous colon and rectal cancers (active)  Oncology and surgery have consulted  Pending MRI  Care team recommending transfer to [facility] due to insurance coverage  [facility] has accepted  Patient still considering options (hospice versus transfer to [facility])  His first choice would be to transfer insurance to [facility] and receive oncologic care here however this might present significant delays affecting his prognosis and treatment options    #Cancer related abdominal pain (active)  MEDD (morphine equivalent daily dose) is approximately 105 mg    CONTINUOUS OPIOID THERAPY  Discontinue oxycodone CR as it is non formulary for Medicare  Start morphine ER at equal analgesic dose (no dose reduction as patient is still in significant pain) 30 mg PO BID    SHORT ACTING OPIOID THERAPY  Prefer single opioid regiment to ensure morphine is working adequately and to preserve opioid receptors for future opioid rotation  Discontinue oxycodone  Start morphine IR 7.5-15 mg PO Q 3 hours PRN mod-severe breakthrough pain  Discontinue hydromorphone IV  Start morphine 3 mg IV Q 3 hours PRN severe pain unrelieved by above    ADJUNCT OPIOID THERAPY  Consider adding gabapentin Monday avoiding today  As want to see how patient responds to opioid rotation to morphine    NON PHARMACOLOGIC THERAPY     #Constipation (active)  Last bowel movement  8/10  Continue senna-docusate (Pericolace or Senakot S) 8.6-50 mg 2 tabs PO BID  Polyethylene glycol/lytes (MiraLAX) 17 gram packet PO daily      If no bowel movement in 1-2 days escalate in the following order:  - magnesium hydroxide (Milk of Magnesia) 30 mL PO daily PRN  - bisacodyl (Dulcolax) suppository 10 mg OK daily PRN    #Sepsis  Finished course of antibiotics    #Anemia (present on admission)  Likely secondary to chronic GI losses   Transfusion parameters in place  Iron infusion per oncology    #Hypokalemia (present on admission)  Placement and monitoring per primary team    #Chronic pain due to back injury and osteoarthritis (present on admission)  Continue home allopurinol  Cancer related pain management will likely help chronic pain    #SVT/elevated troponin, lactic acidosis, MICHAEL (present on admission)  resolved    Code Status:DNR/DNI; assist patient with healthcare directives prior to discharge    Advance Care Planning/Current Goals of Care: Patient and his wife split time between the [facility] in [place].  They have been planning to move to the [facility] however patient's insurance [facility] [employer].  We discussed care options which would include continued cancer work-up and subsequent treatment plan which may include surgery, chemotherapy/immunotherapy, and/or radiation depending on work-up.  It has been recommended that if patient chooses this path he would need to return to [facility] for staging MRI magno 3.0 for rectal cancer.  [facility] [place] accepted [place] [facility] [place] [facility] [place] work-up and oncologic treatment.    Inquired with patient what he is feeling in regards to comfort focused treatment/hospice versus work-up and cancer care.  He expressed he is very anxious about returning to [facility] as him and his wife have essentially relocated to [place].  He and his wife are anxious about transport to [facility] due to patient's weakness and pain.  Confirm that I will validate, but transfer from hospital to hospital would likely be provided via ambulance and/or med a flight accompanied by healthcare providers who could monitor and manage  patient.  Patient is still undecided and would like to see if there is any possibility of clearing out treatment in [place] including transferring Medicare; discussed in my experience this can take a lengthy amount of time which might delay his treatment.  He expressed appreciation for information.  I offered to reach out to his wife which she would appreciate.    Call placed to patient's wife María and provided updates as above.  She found some relief in knowing that patient does want to pursue further work-up and treatment.  She also had questions about hospital transfer as she would feel very anxious transporting him herself.  I expressed I would confirm with care team but again reiterated typical hospital to hospital transfer via ambulance versus care flight with healthcare professionals.  50 minutes was spent discussing advance care planning.     Updated: Dr. Zavala & RN; requested RN provide pain medication.    60 minutes spent with greater than 50% spent counseling and coordinating the patient's care regarding symptom management.   Please refer to HPI and assessment/plan for details.     Danay Acevedo A.P.R.N.  Palliative Care Nurse Practitioner  [phone]

## 2022-08-13 NOTE — PROGRESS NOTES
Report called to Veronica BAIRD on [place] [id number] CNU. Patient belongings gathered. Awaiting patient transport.     17:15 Patient transport arrived. Telemetry monitor removed. Pt placed on Zoll monitor and accompanied patient during transport to [place] [id number]. Patient transported via bed. VS stable and patient in no obvious distress. Patient arrived to unit safely and care handed off to Veronica BAIRD.

## 2022-08-13 NOTE — PROGRESS NOTES
Assumed care this pm from day shift RN. Patient sitting up in bed with no needs at this time. Patient AxO 4, O2 @ RA, VSS. Call bell and personal items within reach, bed locked in low position. Bed exit alarm on. Hourly rounding in place. Tele box in place, monitor room notified.

## 2022-08-13 NOTE — PROGRESS NOTES
[facility] Internal Medicine Daily Progress Note    Date of Service  8/13/2022    [facility] Team: [facility] IM White Team   Attending: Harshal Dawson M.d.  Senior Resident: Dr. Cardona  Intern:  Dr. Zavala  Contact Number: [phone]    Chief Complaint  Nausea, vomiting, abdominal pain    Hospital Course  73 y/o man with hx of HTN, HLD, depression and generalized anxiety disorder previously on Remeron and buspirone, gout, and chronic back pain admitted 8/5/2022 with nausea, vomiting, and abdominal pain. Found to be in SVT in ED with HR up to 184. Resolved after IV fluids and diltiazem. Labs significant for wbc 59940, hgb 6.9, platelets 965, bicarb 14, BUN 28, creatinine 1.47, BNP 3728, and troponin 29. CT abdomen showed large mass in cecum and ascending colon with enlarged RLQ mesenteric lymph nodes. Colonoscopy done which showed significant mass burden involving ileocecal valve in addition to mass proximal to anal verge. Surgery recommending MRI on 3 Joann magnet with rectal cancer protocol and outpatient f/u. Biopsy showed invasive poorly differentiated adenocarcinoma and tubular adenoma with high grade dysplasia. Oncology has seen with recs given. Pending palliative eval. PT suggests post acute care placement. Now with DVT in R peroneal.     Interval Problem Update  Pt with improved knee range of motion and swelling. Back pain relieved with medication and cream. Pt and wife in discussion for hospice vs cancer treatment. Spoke with [facility] admitting physician who accepts pt if he pursues treatment. He continues to have small bowel movements, KUB only showing small colonic stool.     I have discussed this patient's plan of care and discharge plan at IDT rounds today with Case Management, Nursing, Nursing leadership, and other members of the IDT team.    Consultants/Specialty  general surgery, GI, oncology, and palliative care    Code Status  DNAR/DNI    Disposition  Patient is not medically cleared for discharge.   Anticipate  discharge to to home with close outpatient follow-up.  I have placed the appropriate orders for post-discharge needs.    Review of Systems  Review of Systems   Constitutional:  Negative for chills and fever.   HENT:  Negative for ear pain and hearing loss.    Eyes:  Negative for blurred vision and pain.   Respiratory:  Negative for cough and shortness of breath.    Cardiovascular:  Negative for chest pain and palpitations.   Gastrointestinal:  Positive for abdominal pain. Negative for nausea and vomiting.   Genitourinary:  Negative for dysuria and frequency.   Musculoskeletal:  Positive for back pain and joint pain.   Skin:  Negative for itching and rash.   Neurological:  Negative for dizziness and tingling.   Psychiatric/Behavioral:  The patient is nervous/anxious.       Physical Exam  Temp:  [35.8 °C (96.5 °F)-36.8 °C (98.2 °F)] 36.3 °C (97.3 °F)  Pulse:  [65-96] 86  Resp:  [13-21] 18  BP: ()/(45-79) 97/56  SpO2:  [91 %-99 %] 95 %    Physical Exam  Constitutional:       Appearance: Normal appearance.   HENT:      Head: Normocephalic and atraumatic.      Nose: Nose normal.      Mouth/Throat:      Mouth: Mucous membranes are moist.      Pharynx: Oropharynx is clear.   Eyes:      Extraocular Movements: Extraocular movements intact.      Conjunctiva/sclera: Conjunctivae normal.   Cardiovascular:      Rate and Rhythm: Normal rate and regular rhythm.      Pulses: Normal pulses.      Heart sounds: Normal heart sounds.   Pulmonary:      Effort: Pulmonary effort is normal.      Breath sounds: Normal breath sounds.   Abdominal:      General: Abdomen is flat. Bowel sounds are normal.      Tenderness: There is abdominal tenderness (Diffuse but more in RLQ). There is no guarding or rebound.   Musculoskeletal:         General: Swelling (In knee R>L) present.      Cervical back: Normal range of motion and neck supple.      Lumbar back: Tenderness present. No bony tenderness.      Comments: Passive ROM intact and improved.  Limited active ROM due to pain of R knee.    Skin:     General: Skin is warm and dry.      Capillary Refill: Capillary refill takes less than 2 seconds.   Neurological:      General: No focal deficit present.      Mental Status: He is alert and oriented to person, place, and time.       Fluids    Intake/Output Summary (Last 24 hours) at 8/13/2022 1238  Last data filed at 8/13/2022 0600  Gross per 24 hour   Intake 600 ml   Output 1075 ml   Net -475 ml         Laboratory  Recent Labs     08/11/22  0621 08/12/22  0014 08/13/22  0525   WBC 13.8* 12.3* 8.6   RBC 3.44* 3.25* 3.20*   HEMOGLOBIN 7.5* 7.2* 7.1*   HEMATOCRIT 26.7* 25.0* 25.2*   MCV 77.6* 76.9* 78.8*   MCH 21.8* 22.2* 22.2*   MCHC 28.1* 28.8* 28.2*   RDW 58.5* 59.1* 62.4*   PLATELETCT 550* 507* 426   MPV 8.0* 8.1* 8.2*       Recent Labs     08/11/22  0621 08/12/22  0014 08/13/22  0525   SODIUM 129* 131* 132*   POTASSIUM 3.5* 3.8 3.4*   CHLORIDE 91* 92* 98   CO2 24 26 25   GLUCOSE 100* 104* 96   BUN 10 10 9   CREATININE 0.67 0.65 0.61   CALCIUM 9.3 8.9 8.5       Recent Labs     08/12/22  0014   INR 1.16*                 Imaging  [id number] VIEW   Final Result      Normal bowel gas pattern with a small colonic stool burden.      IR-INSERT IVC FILTER WITH IG & SI   Final Result         1. ULTRASOUND AND FLUOROSCOPIC GUIDED PLACEMENT OF A OPTION ELITE CAVAL FILTER DEPLOYED IN THE INFRARENAL IVC.      2. INFERIOR VENA CAVAGRAM WITHIN NORMAL LIMITS WITH NO EVIDENCE OF CAVAL THROMBUS OR OCCLUSION.      US-EXTREMITY VENOUS LOWER BILAT   Final Result      DX-KNEE COMPLETE 4+ RIGHT   Final Result      1.  No evidence of acute fracture or dislocation.   2.  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   3.  Moderate joint effusion.   4.  Chondrocalcinosis.         EC-ECHOCARDIOGRAM COMPLETE W/O CONT   Final Result      CT-ABDOMEN-PELVIS WITH   Final Result      1.  Large cecal mass consistent with colon cancer.      2.  Metastatic lymphadenopathy in the right  lower quadrant mesentery and possibly in the retroperitoneum.      3.  Small focus of extraluminal gas identified posterior to the large mass in the right lower quadrant.      4.  Intrahepatic and extra hepatic biliary duct dilatation possibly related to previous cholecystectomy and patient age. Recommend correlation with liver function tests.      5.  Nonobstructive renal stones.      6.  This was discussed with Dr. Bennett at 4:20 PM.      CT-CTA CHEST PULMONARY ARTERY W/ RECONS   Final Result         1. No CT evidence of pulmonary embolism.   2. Patchy right lower lung opacities, likely atelectasis.   3. No pleural effusion or pneumothorax.         DX-CHEST-PORTABLE (1 VIEW)   Final Result         1. No acute cardiopulmonary abnormalities are identified.      CT-CHEST,ABDOMEN,PELVIS W/O   Final Result      Limited exam due to lack of oral or IV contrast.      1. Large mass involving the cecum and ascending colon, in keeping with primary colon cancer.         2.  Multiple enlarged right lower quadrant mesenteric lymph nodes, likely metastasis. Several mildly prominent retroperitoneal lymph nodes, indeterminate.      3. Nonobstructive left renal calculus.      4. Dilated CBD and intrahepatic bile duct could relate to post cholecystectomy status. Correlate with LFTs.      DX-CHEST-PORTABLE (1 VIEW)   Final Result         1. No acute cardiopulmonary abnormalities are identified.      MR-PELVIS-WITH & W/O AND SEQUENCES    (Results Pending)        Assessment/Plan  Problem Representation:    * Malignant neoplasm of ascending colon (HCC)- (present on admission)  Assessment & Plan  CT shows large mass that is suspected primary colonic cancer.  GI and colorectal surgery has been consulted  8/8/2022:  Biopsies showed invasive poorly differentiated adenocarcinoma of cecal mass and tubular adenoma with high grade dysplasia of rectal mass  General surgery Dr. Bennett following (pending MRI with rectal protocol for staging  and surgical intervention. This will be performed on 3 Joann magnet MRI, outpatient location)  Oncology following, appreciate recs  Palliative has talked with pt and wife  Discussed with pt and his wife   -Pt wife concerned about pt not being able to handle transportation to [facility] but reassured that there will be medical personnel with pt during transportation   -After further discussion, pt wife agreeable to transfer to [place]   -Palliative then talked with pt and wife and they are now in discussion for palliative/hospice vs treatment   -Main goal for pt is to try to have all medical care done in this region as he is trying to move here from [facility]   -It is possible this can be done as an outpatient, however he is not medically cleared for discharge due to debility   -Will need rectal mass protocol MRI for staging then surgery f/u for treatment   -[facility] has accepted pt if he wants to pursue treatment through that route   -Pt wife aware of risks and benefits of attempting to have care done here    Lactic acid acidosis  Assessment & Plan  Resolved  Lactic acid initially 2.5 then 1.4 s/p LR    Acute deep vein thrombosis (DVT) of right peroneal vein (HCC)  Assessment & Plan  Pt with bilateral knee swelling, R>L  Doppler US showed acute, occlusive DVT in one of the paired proximal and mid peroneal veins  Due to borderline hemoglobin and increased risk of bleeding, will defer AC  IR consulted for IVC filter placement   -Risks and benefits provided, pt and wife agrees with plan for IVC filter   -IVC filter placed 8/12    Constipation  Assessment & Plan  Related to large right-sided mass  Continue bowel protocol  KUB with small colonic stool and normal gas pattern    Pain, chronic- (present on admission)  Assessment & Plan  Scheduled and as needed breakthrough pain medications  R knee xray showed no evidence of acute fracture but moderate joint effusion and mild to moderate tricompartmental  osteoarthritis  ROM and swelling improved  Continue ice packs  Will uptitrate Allopurinol to home dose of 300mg bid, will change now to 200mg bid    SVT (supraventricular tachycardia) (HCC)- (present on admission)  Assessment & Plan  Resolved with IV fluid and 1 dose of IV diltiazem in the ED  Continue Lopressor 25mg bid  Telemetry monitoring, no further episodes of SVT    Elevated troponin- (present on admission)  Assessment & Plan  Likely secondary to tachyarrhythmia/SVT, no ongoing chest pain, no ST segment elevations or depressions.  Troponin trend: 29, 18, 27, 24  Repeat EKG if he develops any chest pain    MICHAEL (acute kidney injury) (HCC)- (present on admission)  Assessment & Plan  Resolved    Hypokalemia- (present on admission)  Assessment & Plan  P.o. replacement ordered, monitor    Anemia- (present on admission)  Assessment & Plan  Likley 2/2 chronic GI losses with large colonic mass suspected to be colon Ca  Transfuse hemoglobin less than 7  Serially monitor hemoglobin  8/8/2022:  Continue medical management, patient did require transfusion of 1 unit PRBC.  Continue to hold anticoagulation for now  Iron 19, ferritin 125, B12 1751  Start iron infusions per oncology    Sepsis (HCC)- (present on admission)  Assessment & Plan  This is Sepsis Present on admission  SIRS criteria identified on my evaluation include: Tachycardia, with heart rate greater than 90 BPM, Tachypnea, with respirations greater than 20 per minute and Leukocytosis, with WBC greater than 12,000  Source is Suspected abdominal, possibly just gastroenteritis, symptoms of nausea, vomiting, abdominal pain.   Finished courses of cefuroxime (8/9 - 8/13) and flagyl (8/8 - 8/13)     VTE prophylaxis: SCDs/TEDs    I have performed a physical exam and reviewed and updated ROS and Plan today (8/13/2022). In review of yesterday's note (8/12/2022), there are no changes except as documented above.

## 2022-08-14 LAB
ALBUMIN SERPL BCP-MCNC: 2.6 G/DL (ref 3.2–4.9)
ALBUMIN/GLOB SERPL: 0.7 G/DL
ALP SERPL-CCNC: 207 U/L (ref 30–99)
ALT SERPL-CCNC: 8 U/L (ref 2–50)
ANION GAP SERPL CALC-SCNC: 10 MMOL/L (ref 7–16)
AST SERPL-CCNC: 13 U/L (ref 12–45)
BASOPHILS # BLD AUTO: 0.5 % (ref 0–1.8)
BASOPHILS # BLD: 0.05 K/UL (ref 0–0.12)
BILIRUB SERPL-MCNC: 0.2 MG/DL (ref 0.1–1.5)
BUN SERPL-MCNC: 9 MG/DL (ref 8–22)
CALCIUM SERPL-MCNC: 8.3 MG/DL (ref 8.5–10.5)
CHLORIDE SERPL-SCNC: 98 MMOL/L (ref 96–112)
CO2 SERPL-SCNC: 25 MMOL/L (ref 20–33)
CREAT SERPL-MCNC: 0.61 MG/DL (ref 0.5–1.4)
EOSINOPHIL # BLD AUTO: 0.22 K/UL (ref 0–0.51)
EOSINOPHIL NFR BLD: 2.4 % (ref 0–6.9)
ERYTHROCYTE [DISTWIDTH] IN BLOOD BY AUTOMATED COUNT: 63.3 FL (ref 35.9–50)
GFR SERPLBLD CREATININE-BSD FMLA CKD-EPI: 102 ML/MIN/1.73 M 2
GLOBULIN SER CALC-MCNC: 3.6 G/DL (ref 1.9–3.5)
GLUCOSE SERPL-MCNC: 111 MG/DL (ref 65–99)
HCT VFR BLD AUTO: 24.6 % (ref 42–52)
HGB BLD-MCNC: 7 G/DL (ref 14–18)
IMM GRANULOCYTES # BLD AUTO: 0.05 K/UL (ref 0–0.11)
IMM GRANULOCYTES NFR BLD AUTO: 0.5 % (ref 0–0.9)
LYMPHOCYTES # BLD AUTO: 1.14 K/UL (ref 1–4.8)
LYMPHOCYTES NFR BLD: 12.3 % (ref 22–41)
MCH RBC QN AUTO: 22.6 PG (ref 27–33)
MCHC RBC AUTO-ENTMCNC: 28.5 G/DL (ref 33.7–35.3)
MCV RBC AUTO: 79.4 FL (ref 81.4–97.8)
MONOCYTES # BLD AUTO: 1.09 K/UL (ref 0–0.85)
MONOCYTES NFR BLD AUTO: 11.7 % (ref 0–13.4)
NEUTROPHILS # BLD AUTO: 6.75 K/UL (ref 1.82–7.42)
NEUTROPHILS NFR BLD: 72.6 % (ref 44–72)
NRBC # BLD AUTO: 0 K/UL
NRBC BLD-RTO: 0 /100 WBC
PLATELET # BLD AUTO: 436 K/UL (ref 164–446)
PMV BLD AUTO: 8.2 FL (ref 9–12.9)
POTASSIUM SERPL-SCNC: 3.4 MMOL/L (ref 3.6–5.5)
PROT SERPL-MCNC: 6.2 G/DL (ref 6–8.2)
RBC # BLD AUTO: 3.1 M/UL (ref 4.7–6.1)
SODIUM SERPL-SCNC: 133 MMOL/L (ref 135–145)
WBC # BLD AUTO: 9.3 K/UL (ref 4.8–10.8)

## 2022-08-14 PROCEDURE — A9270 NON-COVERED ITEM OR SERVICE: HCPCS | Performed by: NURSE PRACTITIONER

## 2022-08-14 PROCEDURE — 700102 HCHG RX REV CODE 250 W/ 637 OVERRIDE(OP): Performed by: STUDENT IN AN ORGANIZED HEALTH CARE EDUCATION/TRAINING PROGRAM

## 2022-08-14 PROCEDURE — A9270 NON-COVERED ITEM OR SERVICE: HCPCS | Performed by: STUDENT IN AN ORGANIZED HEALTH CARE EDUCATION/TRAINING PROGRAM

## 2022-08-14 PROCEDURE — 700102 HCHG RX REV CODE 250 W/ 637 OVERRIDE(OP)

## 2022-08-14 PROCEDURE — 700102 HCHG RX REV CODE 250 W/ 637 OVERRIDE(OP): Performed by: NURSE PRACTITIONER

## 2022-08-14 PROCEDURE — A9270 NON-COVERED ITEM OR SERVICE: HCPCS

## 2022-08-14 PROCEDURE — 36415 COLL VENOUS BLD VENIPUNCTURE: CPT

## 2022-08-14 PROCEDURE — 85025 COMPLETE CBC W/AUTO DIFF WBC: CPT

## 2022-08-14 PROCEDURE — 99232 SBSQ HOSP IP/OBS MODERATE 35: CPT | Mod: GC | Performed by: INTERNAL MEDICINE

## 2022-08-14 PROCEDURE — 700105 HCHG RX REV CODE 258: Performed by: STUDENT IN AN ORGANIZED HEALTH CARE EDUCATION/TRAINING PROGRAM

## 2022-08-14 PROCEDURE — 700105 HCHG RX REV CODE 258: Performed by: INTERNAL MEDICINE

## 2022-08-14 PROCEDURE — 80053 COMPREHEN METABOLIC PANEL: CPT

## 2022-08-14 PROCEDURE — 770020 HCHG ROOM/CARE - TELE (206)

## 2022-08-14 PROCEDURE — 700111 HCHG RX REV CODE 636 W/ 250 OVERRIDE (IP): Performed by: INTERNAL MEDICINE

## 2022-08-14 RX ORDER — CALCIUM CARBONATE 500 MG/1
500 TABLET, CHEWABLE ORAL ONCE
Status: COMPLETED | OUTPATIENT
Start: 2022-08-14 | End: 2022-08-14

## 2022-08-14 RX ORDER — POTASSIUM CHLORIDE 20 MEQ/1
40 TABLET, EXTENDED RELEASE ORAL DAILY
Status: COMPLETED | OUTPATIENT
Start: 2022-08-14 | End: 2022-08-14

## 2022-08-14 RX ADMIN — SODIUM CHLORIDE 250 MG: 9 INJECTION, SOLUTION INTRAVENOUS at 08:08

## 2022-08-14 RX ADMIN — METOPROLOL TARTRATE 25 MG: 25 TABLET, FILM COATED ORAL at 17:14

## 2022-08-14 RX ADMIN — SENNOSIDES AND DOCUSATE SODIUM 2 TABLET: 50; 8.6 TABLET ORAL at 05:11

## 2022-08-14 RX ADMIN — ALLOPURINOL 200 MG: 100 TABLET ORAL at 05:12

## 2022-08-14 RX ADMIN — SODIUM CHLORIDE, POTASSIUM CHLORIDE, SODIUM LACTATE AND CALCIUM CHLORIDE: 600; 310; 30; 20 INJECTION, SOLUTION INTRAVENOUS at 11:23

## 2022-08-14 RX ADMIN — MORPHINE SULFATE 15 MG: 15 TABLET ORAL at 18:06

## 2022-08-14 RX ADMIN — METOPROLOL TARTRATE 25 MG: 25 TABLET, FILM COATED ORAL at 05:12

## 2022-08-14 RX ADMIN — MORPHINE SULFATE 15 MG: 15 TABLET ORAL at 11:22

## 2022-08-14 RX ADMIN — MORPHINE SULFATE 30 MG: 30 TABLET, FILM COATED, EXTENDED RELEASE ORAL at 05:11

## 2022-08-14 RX ADMIN — MORPHINE SULFATE 15 MG: 15 TABLET ORAL at 21:17

## 2022-08-14 RX ADMIN — MORPHINE SULFATE 30 MG: 30 TABLET, FILM COATED, EXTENDED RELEASE ORAL at 17:14

## 2022-08-14 RX ADMIN — POTASSIUM CHLORIDE 40 MEQ: 1500 TABLET, EXTENDED RELEASE ORAL at 08:16

## 2022-08-14 RX ADMIN — MORPHINE SULFATE 15 MG: 15 TABLET ORAL at 01:47

## 2022-08-14 RX ADMIN — ALLOPURINOL 200 MG: 100 TABLET ORAL at 17:15

## 2022-08-14 RX ADMIN — MAGNESIUM HYDROXIDE 30 ML: 400 SUSPENSION ORAL at 11:22

## 2022-08-14 RX ADMIN — MORPHINE SULFATE 15 MG: 15 TABLET ORAL at 08:16

## 2022-08-14 RX ADMIN — POLYETHYLENE GLYCOL 3350 1 PACKET: 17 POWDER, FOR SOLUTION ORAL at 05:10

## 2022-08-14 RX ADMIN — MORPHINE SULFATE 15 MG: 15 TABLET ORAL at 05:11

## 2022-08-14 RX ADMIN — CALCIUM CARBONATE 500 MG: 500 TABLET, CHEWABLE ORAL at 05:55

## 2022-08-14 ASSESSMENT — ENCOUNTER SYMPTOMS
CHILLS: 0
COUGH: 0
SHORTNESS OF BREATH: 0
BACK PAIN: 1
VOMITING: 0
FEVER: 0
HEADACHES: 0
PALPITATIONS: 0
NAUSEA: 0
ABDOMINAL PAIN: 1
DIZZINESS: 0
EYE PAIN: 0
NERVOUS/ANXIOUS: 0

## 2022-08-14 ASSESSMENT — PAIN DESCRIPTION - PAIN TYPE
TYPE: CHRONIC PAIN
TYPE: ACUTE PAIN

## 2022-08-14 NOTE — PROGRESS NOTES
[facility] Internal Medicine Daily Progress Note    Date of Service  8/14/2022    [facility] Team: R IM Purple Team   Attending: Jamar Guerrero M.d.  Senior Resident: Dr. Mayo  Intern:  Dr. Patel  Contact Number: [phone]    Chief Complaint  Nausea, vomiting, abdominal pain    Hospital Course  71 y/o man with hx of HTN, HLD, depression and generalized anxiety disorder previously on Remeron and buspirone, gout, and chronic back pain admitted 8/5/2022 with nausea, vomiting, and abdominal pain. Found to be in SVT in ED with HR up to 184. Resolved after IV fluids and diltiazem. Labs significant for wbc 47759, hgb 6.9, platelets 965, bicarb 14, BUN 28, creatinine 1.47, BNP 3728, and troponin 29. CT abdomen showed large mass in cecum and ascending colon with enlarged RLQ mesenteric lymph nodes. Colonoscopy done which showed significant mass burden involving ileocecal valve in addition to mass proximal to anal verge. Surgery recommending MRI on 3 Joann magnet with rectal cancer protocol and outpatient f/u. Biopsy showed invasive poorly differentiated adenocarcinoma and tubular adenoma with high grade dysplasia. Oncology has seen with recs given. Pending palliative eval. PT suggests post acute care placement. Now with DVT in R peroneal. IVC filter placed 8/12. Pt and wife deciding on hospice vs treatment ([facility] has accepted pt if treatment is pursued)    Interval Problem Update  Seen on 8/14 morning, that he is overall feeling better than yesterday.  Patient's back pain and abdominal pain have improved, as well as the pain in his bilateral knees and feet.  Has not had a bowel movement in 2 1/2 days.  He is urinating without issues.  He states that he cannot ambulate well with a walker as he tends to fall forward and is currently on two- nurse assist.  Patient's wife was not with him today, therefore she was called and informed about palliative care and the primary team meeting with Mr. Peters and his wife tomorrow to discuss  whether patient wants to pursue treatment at [place] versus palliative care here at [facility].    I have discussed this patient's plan of care and discharge plan at IDT rounds today with Case Management, Nursing, Nursing leadership, and other members of the IDT team.    Consultants/Specialty  general surgery, GI, oncology, and palliative care    Code Status  DNAR/DNI    Disposition  Patient is not medically cleared for discharge.   Anticipate discharge to to home with close outpatient follow-up.  I have placed the appropriate orders for post-discharge needs.    Review of Systems  Review of Systems   Constitutional:  Negative for chills and fever.   Eyes:  Negative for pain.   Respiratory:  Negative for cough and shortness of breath.    Cardiovascular:  Negative for chest pain and palpitations.   Gastrointestinal:  Positive for abdominal pain. Negative for nausea and vomiting.   Genitourinary:  Negative for dysuria and frequency.   Musculoskeletal:  Positive for back pain and joint pain.   Neurological:  Negative for dizziness and headaches.   Psychiatric/Behavioral:  The patient is not nervous/anxious.       Physical Exam  Temp:  [35.8 °C (96.5 °F)-37.2 °C (98.9 °F)] 37.2 °C (98.9 °F)  Pulse:  [] 90  Resp:  [18-20] 18  BP: (100-116)/(57-66) 105/61  SpO2:  [93 %-97 %] 95 %    Physical Exam  Constitutional:       Appearance: Normal appearance.   HENT:      Head: Normocephalic and atraumatic.      Nose: Nose normal.      Mouth/Throat:      Mouth: Mucous membranes are moist.      Pharynx: Oropharynx is clear.   Eyes:      Extraocular Movements: Extraocular movements intact.      Conjunctiva/sclera: Conjunctivae normal.   Cardiovascular:      Rate and Rhythm: Normal rate and regular rhythm.      Pulses: Normal pulses.      Heart sounds: Normal heart sounds.   Pulmonary:      Effort: Pulmonary effort is normal.      Breath sounds: Normal breath sounds.   Abdominal:      General: Abdomen is flat. Bowel sounds are normal.       Tenderness: There is abdominal tenderness (Diffuse, but more focused laterally on both sides). There is no guarding or rebound.   Musculoskeletal:         General: Swelling (Bilateral knees) present.      Cervical back: Normal range of motion and neck supple.      Lumbar back: Tenderness present. No bony tenderness.      Comments: Passive ROM intact and improved in bilateral lower extremities   Skin:     General: Skin is warm and dry.      Capillary Refill: Capillary refill takes less than 2 seconds.   Neurological:      General: No focal deficit present.      Mental Status: He is alert and oriented to person, place, and time.       Fluids    Intake/Output Summary (Last 24 hours) at 8/14/2022 1410  Last data filed at 8/14/2022 0908  Gross per 24 hour   Intake 240 ml   Output 1400 ml   Net -1160 ml       Laboratory  Recent Labs     08/12/22  0014 08/13/22  0525 08/14/22  0042   WBC 12.3* 8.6 9.3   RBC 3.25* 3.20* 3.10*   HEMOGLOBIN 7.2* 7.1* 7.0*   HEMATOCRIT 25.0* 25.2* 24.6*   MCV 76.9* 78.8* 79.4*   MCH 22.2* 22.2* 22.6*   MCHC 28.8* 28.2* 28.5*   RDW 59.1* 62.4* 63.3*   PLATELETCT 507* 426 436   MPV 8.1* 8.2* 8.2*     Recent Labs     08/12/22  0014 08/13/22  0525 08/14/22  0042   SODIUM 131* 132* 133*   POTASSIUM 3.8 3.4* 3.4*   CHLORIDE 92* 98 98   CO2 26 25 25   GLUCOSE 104* 96 111*   BUN 10 9 9   CREATININE 0.65 0.61 0.61   CALCIUM 8.9 8.5 8.3*     Recent Labs     08/12/22  0014   INR 1.16*               Imaging  [id number] VIEW   Final Result      Normal bowel gas pattern with a small colonic stool burden.      IR-INSERT IVC FILTER WITH IG & SI   Final Result         1. ULTRASOUND AND FLUOROSCOPIC GUIDED PLACEMENT OF A OPTION ELITE CAVAL FILTER DEPLOYED IN THE INFRARENAL IVC.      2. INFERIOR VENA CAVAGRAM WITHIN NORMAL LIMITS WITH NO EVIDENCE OF CAVAL THROMBUS OR OCCLUSION.      US-EXTREMITY VENOUS LOWER BILAT   Final Result      DX-KNEE COMPLETE 4+ RIGHT   Final Result      1.  No evidence of acute  fracture or dislocation.   2.  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   3.  Moderate joint effusion.   4.  Chondrocalcinosis.         EC-ECHOCARDIOGRAM COMPLETE W/O CONT   Final Result      CT-ABDOMEN-PELVIS WITH   Final Result      1.  Large cecal mass consistent with colon cancer.      2.  Metastatic lymphadenopathy in the right lower quadrant mesentery and possibly in the retroperitoneum.      3.  Small focus of extraluminal gas identified posterior to the large mass in the right lower quadrant.      4.  Intrahepatic and extra hepatic biliary duct dilatation possibly related to previous cholecystectomy and patient age. Recommend correlation with liver function tests.      5.  Nonobstructive renal stones.      6.  This was discussed with Dr. Bennett at 4:20 PM.      CT-CTA CHEST PULMONARY ARTERY W/ RECONS   Final Result         1. No CT evidence of pulmonary embolism.   2. Patchy right lower lung opacities, likely atelectasis.   3. No pleural effusion or pneumothorax.         DX-CHEST-PORTABLE (1 VIEW)   Final Result         1. No acute cardiopulmonary abnormalities are identified.      CT-CHEST,ABDOMEN,PELVIS W/O   Final Result      Limited exam due to lack of oral or IV contrast.      1. Large mass involving the cecum and ascending colon, in keeping with primary colon cancer.         2.  Multiple enlarged right lower quadrant mesenteric lymph nodes, likely metastasis. Several mildly prominent retroperitoneal lymph nodes, indeterminate.      3. Nonobstructive left renal calculus.      4. Dilated CBD and intrahepatic bile duct could relate to post cholecystectomy status. Correlate with LFTs.      DX-CHEST-PORTABLE (1 VIEW)   Final Result         1. No acute cardiopulmonary abnormalities are identified.      MR-PELVIS-WITH & W/O AND SEQUENCES    (Results Pending)        Assessment/Plan  Problem Representation:    * Malignant neoplasm of ascending colon (HCC)- (present on admission)  Assessment &  Plan  CT shows large mass that is suspected primary colonic cancer. GI and colorectal surgery following. 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 General surgery Dr. Bennett following (pending MRI with rectal protocol for staging and surgical intervention. This will be performed on Joann 3 magnet MRI, outpatient location). Oncology following, appreciate recs. Palliative has talked with pt and wife    -Palliative and primary team will meet with patient and wife on 8/15 in order to discuss whether patient wants to pursue treatment (which will likely need to be done outside of [facility] due to patient being unable to obtain Joann 3 magnet MRI for staging here) versus palliative care here at [facility]  -Hgb at 7 today, likely secondary to bleeding from colonic neoplasms.  We will continue to monitor and transfuse as appropriate    Lactic acid acidosis  Assessment & Plan  Lactic acid initially 2.5 then 1.4 s/p LR    -Resolved  -We will monitor as needed    Acute deep vein thrombosis (DVT) of right peroneal vein (HCC)  Assessment & Plan  Pt with bilateral knee swelling, R>L. Doppler US showed acute, occlusive DVT in one of the paired proximal and mid peroneal veins.     -Due to borderline hemoglobin and increased risk of bleeding, will defer AC   -IVC filter placed on 8/12    Constipation  Assessment & Plan  Related to large right-sided mass    -Continue bowel protocol  -KUB demonstrated small colonic stool and normal gas pattern    Pain, chronic- (present on admission)  Assessment & Plan  Scheduled and as needed breakthrough pain medications. R knee xray showed no evidence of acute fracture but moderate joint effusion and mild to moderate tricompartmental osteoarthritis    -ROM and swelling improved  -Continue ice packs  -Will uptitrate Allopurinol to home dose of 300mg bid, currently on 200mg bid    SVT (supraventricular  tachycardia) (HCC)- (present on admission)  Assessment & Plan  Resolved with IV fluid and 1 dose of IV diltiazem in the ED. During telemetry monitoring, patient had no further episodes of SVT    -Continue Lopressor 25mg bid  -Continue to monitor for signs and symptoms of SVT    Elevated troponin- (present on admission)  Assessment & Plan  Likely secondary to tachyarrhythmia/SVT, no ongoing chest pain, no ST segment elevations or depressions. Troponin trend: 29, 18, 27, 24    -Repeat EKG if he develops any chest pain    MICHAEL (acute kidney injury) (HCC)- (present on admission)  Assessment & Plan  Had acute kidney injury during admission.      -Resolved  -Continue to monitor renal function through serial labs    Hypokalemia- (present on admission)  Assessment & Plan  Patient has had bouts of hypokalemia during admission    -Current potassium at 3.4  -Given 40 MeQ Kdur  -Continue to monitor through serial labs    Anemia- (present on admission)  Assessment & Plan  Likley 2/2 chronic GI losses with large colonic mass suspected to be colon cancer.  Has required 1 transfusion PRBC thus far    -Transfuse hemoglobin less than 7  -We will continue to trend H&H  -He is on iron infusions per oncology    Sepsis (HCC)- (present on admission)  Assessment & Plan  This is Sepsis Present on admission  SIRS criteria identified on my evaluation include: Tachycardia, with heart rate greater than 90 BPM, Tachypnea, with respirations greater than 20 per minute and Leukocytosis, with WBC greater than 12,000  Source is Suspected abdominal, possibly just gastroenteritis, symptoms of nausea, vomiting, abdominal pain.   Finished courses of cefuroxime (8/9 - 8/13) and flagyl (8/8 - 8/13)     VTE prophylaxis: SCDs/TEDs    I have performed a physical exam and reviewed and updated ROS and Plan today (8/14/2022). In review of yesterday's note (8/13/2022), there are no changes except as documented above.

## 2022-08-14 NOTE — PALLIATIVE CARE
PALLIATIVE CARE FOLLOW-UP:      Discussed with/Updated:   Dr. Patel, primary RN Danay, PAN [facility]       Plan:  Palliative Care to join family meeting tomorrow approximately 10:30.    Thank you for allowing Palliative Care to support this pt and his family.  Contact [phone] for additional assistance, patient status change, questions or concerns.

## 2022-08-14 NOTE — DIETARY
Nutrition Services Brief Update:    Day 9 of admit.  Everett Peters is a 72 y.o. male with admitting DX of Sepsis (HCC) [A41.9]    Current Diet: Regular with oral nutrition supplements    Problem: Nutritional:  Goal: Achieve adequate nutritional intake  Description: Diet to advance past NPO/Clear liquid and patient will consume 50% of meals  Outcome: Progressing  Diet advanced to Regular, PO % for one documented meal thus far

## 2022-08-14 NOTE — CARE PLAN
The patient is Watcher - Medium risk of patient condition declining or worsening    Shift Goals  Clinical Goals: pain control, rest  Patient Goals: pain control  Family Goals: MORENO. No family present.    Progress made toward(s) clinical / shift goals:    Problem: Pain - Standard  Goal: Alleviation of pain or a reduction in pain to the patient’s comfort goal  Outcome: Progressing     Problem: Hemodynamics  Goal: Patient's hemodynamics, fluid balance and neurologic status will be stable or improve  Outcome: Progressing     Problem: Urinary - Renal Perfusion  Goal: Ability to achieve and maintain adequate renal perfusion and functioning will improve  Outcome: Progressing     Problem: Respiratory  Goal: Patient will achieve/maintain optimum respiratory ventilation and gas exchange  Outcome: Progressing     Problem: Skin Integrity  Goal: Skin integrity is maintained or improved  Outcome: Progressing     Problem: Fall Risk  Goal: Patient will remain free from falls  Outcome: Progressing       Patient is not progressing towards the following goals:

## 2022-08-14 NOTE — PROGRESS NOTES
Pt admitted to room R314 via transport in bed at 1730.    Patient reports pain at 7 on a scale of 0-10. Educated patient regarding pharmacologic and non pharmacologic modalities for pain management. Oriented to room call light and smoking policy.  Reviewed plan of care (equipment, incentive spirometer, sequential compression devices, medications, activity, diet, fall precautions, skin care, and pain) with patient and family. Welcome packet given and reviewed with patient, all questions answered. Education provided on oral hygiene program.     AA&Ox4. Denies SOB.  See 2 RN skin note.  Regular Diet.       Plan of care discussed, all questions answered. Educated on the importance of calling before getting OOB and pt verbalizes understanding. Educated regarding importance of oral care. Oral care kit at bedside. Call light is within reach, treaded slipper socks on, bed in lowest/ locked position, hourly rounding in place, all needs met at this time.    Veronica Moon R.N.

## 2022-08-14 NOTE — PROGRESS NOTES
Received report from Evelina BAIRD. Assumed care at 0700  Patient a & o x 4.   SpO2 94\% on RA  Voiding w/o difficulty  BM on 8/10. Will advance bowel protocol. Abd soft, Bowel sounds active, denies nausea  Diet regular  Pt states pain 8/10 to back; Morphine prn  no drain  Surgical dressing none  Skin tear to back.  Patient ambulating with 2 person assistance and walker. Gait unsteady.   Patient oriented to room, surroundings and call bell. Bed alarm on. Bed in lowest position, locked and upper side rails up. Patient demonstrated correct use of call bell. Call bell/belongings within reach. Patient educated on hourly rounding.   Plan   PT/OT/ambulation  Pain control  Transfer to [facility] when medically stable  No transfusion today per primary team; recheck labs in a.m.

## 2022-08-14 NOTE — CARE PLAN
Problem: Pain - Standard  Goal: Alleviation of pain or a reduction in pain to the patient’s comfort goal  Outcome: Progressing  Note: PRN Morphine oral. Pt states pain doesn't go lower than 6/10. Pt has been requiring oral Morphine q3-5hrs.      Problem: Fall Risk  Goal: Patient will remain free from falls  Outcome: Progressing  Note: High fall risk. Bed in lowest and locked position. Upper side rails up. Yellow fall risk band on. Yellow socks on. Pt had a fall this admission. Pt calls appropriately and doesn't attempt to get out of bed without staff assistance. Bed and chair alarm on.     The patient is Stable - Low risk of patient condition declining or worsening    Shift Goals  Clinical Goals: pain control; safety  Patient Goals: rest  Family Goals: none present    Progress made toward(s) clinical / shift goals:       Patient is not progressing towards the following goals:

## 2022-08-14 NOTE — PROGRESS NOTES
4 Eyes Skin Assessment Completed by BRIE Martin and Leigh RN.    Head WDL  Ears WDL  Nose WDL  Mouth WDL  Neck Incision- R IJ (Gauze and tegaderm)  Breast/Chest WDL  Shoulder Blades WDL  Spine- Tear, redness, blanching  (R) Arm/Elbow/Hand Redness and Blanching  (L) Arm/Elbow/Hand Redness and Blanching  Abdomen WDL  Groin WDL  Scrotum/Coccyx/Buttocks Redness and Blanching  (R) Leg WDL  (L) Leg WDL  (R) Heel/Foot/Toe Redness and Blanching, calloused  (L) Heel/Foot/Toe Redness and Blanching, calloused          Devices In Places Tele Box, Blood Pressure Cuff, and Pulse Ox      Interventions In Place Pillows and Heels Loaded W/Pillows    Possible Skin Injury No    Pictures Uploaded Into Epic N/A  Wound Consult Placed N/A  RN Wound Prevention Protocol Ordered No     RUTH CardosoN.

## 2022-08-15 LAB
ABO GROUP BLD: ABNORMAL
ALBUMIN SERPL BCP-MCNC: 2.3 G/DL (ref 3.2–4.9)
ALBUMIN/GLOB SERPL: 0.7 G/DL
ALP SERPL-CCNC: 345 U/L (ref 30–99)
ALT SERPL-CCNC: 6 U/L (ref 2–50)
ANION GAP SERPL CALC-SCNC: 8 MMOL/L (ref 7–16)
AST SERPL-CCNC: 16 U/L (ref 12–45)
BARCODED ABORH UBTYP: 600
BARCODED PRD CODE UBPRD: ABNORMAL
BARCODED UNIT NUM UBUNT: ABNORMAL
BASOPHILS # BLD AUTO: 0.6 % (ref 0–1.8)
BASOPHILS # BLD: 0.06 K/UL (ref 0–0.12)
BILIRUB SERPL-MCNC: 0.2 MG/DL (ref 0.1–1.5)
BLD GP AB SCN SERPL QL: ABNORMAL
BUN SERPL-MCNC: 7 MG/DL (ref 8–22)
CALCIUM SERPL-MCNC: 8.2 MG/DL (ref 8.5–10.5)
CHLORIDE SERPL-SCNC: 103 MMOL/L (ref 96–112)
CO2 SERPL-SCNC: 23 MMOL/L (ref 20–33)
COMPONENT R 8504R: ABNORMAL
CREAT SERPL-MCNC: 0.45 MG/DL (ref 0.5–1.4)
EOSINOPHIL # BLD AUTO: 0.23 K/UL (ref 0–0.51)
EOSINOPHIL NFR BLD: 2.4 % (ref 0–6.9)
ERYTHROCYTE [DISTWIDTH] IN BLOOD BY AUTOMATED COUNT: 63.5 FL (ref 35.9–50)
ERYTHROCYTE [DISTWIDTH] IN BLOOD BY AUTOMATED COUNT: 65.2 FL (ref 35.9–50)
GFR SERPLBLD CREATININE-BSD FMLA CKD-EPI: 112 ML/MIN/1.73 M 2
GLOBULIN SER CALC-MCNC: 3.5 G/DL (ref 1.9–3.5)
GLUCOSE SERPL-MCNC: 102 MG/DL (ref 65–99)
HCT VFR BLD AUTO: 24.2 % (ref 42–52)
HCT VFR BLD AUTO: 29.2 % (ref 42–52)
HGB BLD-MCNC: 6.8 G/DL (ref 14–18)
HGB BLD-MCNC: 8.6 G/DL (ref 14–18)
IMM GRANULOCYTES # BLD AUTO: 0.05 K/UL (ref 0–0.11)
IMM GRANULOCYTES NFR BLD AUTO: 0.5 % (ref 0–0.9)
LYMPHOCYTES # BLD AUTO: 1.51 K/UL (ref 1–4.8)
LYMPHOCYTES NFR BLD: 15.8 % (ref 22–41)
MAGNESIUM SERPL-MCNC: 1.6 MG/DL (ref 1.5–2.5)
MCH RBC QN AUTO: 22.7 PG (ref 27–33)
MCH RBC QN AUTO: 23.9 PG (ref 27–33)
MCHC RBC AUTO-ENTMCNC: 28.1 G/DL (ref 33.7–35.3)
MCHC RBC AUTO-ENTMCNC: 29.5 G/DL (ref 33.7–35.3)
MCV RBC AUTO: 80.9 FL (ref 81.4–97.8)
MCV RBC AUTO: 81.1 FL (ref 81.4–97.8)
MONOCYTES # BLD AUTO: 1.09 K/UL (ref 0–0.85)
MONOCYTES NFR BLD AUTO: 11.4 % (ref 0–13.4)
NEUTROPHILS # BLD AUTO: 6.63 K/UL (ref 1.82–7.42)
NEUTROPHILS NFR BLD: 69.3 % (ref 44–72)
NRBC # BLD AUTO: 0 K/UL
NRBC BLD-RTO: 0 /100 WBC
PLATELET # BLD AUTO: 384 K/UL (ref 164–446)
PLATELET # BLD AUTO: 439 K/UL (ref 164–446)
PMV BLD AUTO: 7.9 FL (ref 9–12.9)
PMV BLD AUTO: 8.1 FL (ref 9–12.9)
POTASSIUM SERPL-SCNC: 4.1 MMOL/L (ref 3.6–5.5)
PRODUCT TYPE UPROD: ABNORMAL
PROT SERPL-MCNC: 5.8 G/DL (ref 6–8.2)
RBC # BLD AUTO: 2.99 M/UL (ref 4.7–6.1)
RBC # BLD AUTO: 3.6 M/UL (ref 4.7–6.1)
RH BLD: ABNORMAL
SODIUM SERPL-SCNC: 134 MMOL/L (ref 135–145)
UNIT STATUS USTAT: ABNORMAL
WBC # BLD AUTO: 11.6 K/UL (ref 4.8–10.8)
WBC # BLD AUTO: 9.6 K/UL (ref 4.8–10.8)

## 2022-08-15 PROCEDURE — 80053 COMPREHEN METABOLIC PANEL: CPT

## 2022-08-15 PROCEDURE — P9016 RBC LEUKOCYTES REDUCED: HCPCS

## 2022-08-15 PROCEDURE — 85027 COMPLETE CBC AUTOMATED: CPT

## 2022-08-15 PROCEDURE — 99497 ADVNCD CARE PLAN 30 MIN: CPT | Performed by: NURSE PRACTITIONER

## 2022-08-15 PROCEDURE — 36415 COLL VENOUS BLD VENIPUNCTURE: CPT

## 2022-08-15 PROCEDURE — 700105 HCHG RX REV CODE 258: Performed by: INTERNAL MEDICINE

## 2022-08-15 PROCEDURE — 770020 HCHG ROOM/CARE - TELE (206)

## 2022-08-15 PROCEDURE — A9270 NON-COVERED ITEM OR SERVICE: HCPCS | Performed by: NURSE PRACTITIONER

## 2022-08-15 PROCEDURE — 99233 SBSQ HOSP IP/OBS HIGH 50: CPT | Mod: GC | Performed by: INTERNAL MEDICINE

## 2022-08-15 PROCEDURE — 700102 HCHG RX REV CODE 250 W/ 637 OVERRIDE(OP): Performed by: NURSE PRACTITIONER

## 2022-08-15 PROCEDURE — 85025 COMPLETE CBC W/AUTO DIFF WBC: CPT

## 2022-08-15 PROCEDURE — 700111 HCHG RX REV CODE 636 W/ 250 OVERRIDE (IP): Performed by: NURSE PRACTITIONER

## 2022-08-15 PROCEDURE — 83735 ASSAY OF MAGNESIUM: CPT

## 2022-08-15 PROCEDURE — 86901 BLOOD TYPING SEROLOGIC RH(D): CPT

## 2022-08-15 PROCEDURE — 86850 RBC ANTIBODY SCREEN: CPT

## 2022-08-15 PROCEDURE — A9270 NON-COVERED ITEM OR SERVICE: HCPCS | Performed by: STUDENT IN AN ORGANIZED HEALTH CARE EDUCATION/TRAINING PROGRAM

## 2022-08-15 PROCEDURE — 99233 SBSQ HOSP IP/OBS HIGH 50: CPT | Mod: 25 | Performed by: NURSE PRACTITIONER

## 2022-08-15 PROCEDURE — A9270 NON-COVERED ITEM OR SERVICE: HCPCS

## 2022-08-15 PROCEDURE — 86900 BLOOD TYPING SEROLOGIC ABO: CPT

## 2022-08-15 PROCEDURE — 700111 HCHG RX REV CODE 636 W/ 250 OVERRIDE (IP): Performed by: INTERNAL MEDICINE

## 2022-08-15 PROCEDURE — 36430 TRANSFUSION BLD/BLD COMPNT: CPT

## 2022-08-15 PROCEDURE — 86923 COMPATIBILITY TEST ELECTRIC: CPT

## 2022-08-15 PROCEDURE — 700105 HCHG RX REV CODE 258: Performed by: STUDENT IN AN ORGANIZED HEALTH CARE EDUCATION/TRAINING PROGRAM

## 2022-08-15 PROCEDURE — 99498 ADVNCD CARE PLAN ADDL 30 MIN: CPT | Performed by: NURSE PRACTITIONER

## 2022-08-15 PROCEDURE — 700102 HCHG RX REV CODE 250 W/ 637 OVERRIDE(OP)

## 2022-08-15 PROCEDURE — 700102 HCHG RX REV CODE 250 W/ 637 OVERRIDE(OP): Performed by: STUDENT IN AN ORGANIZED HEALTH CARE EDUCATION/TRAINING PROGRAM

## 2022-08-15 RX ORDER — AMOXICILLIN 250 MG
2 CAPSULE ORAL 2 TIMES DAILY
Status: DISCONTINUED | OUTPATIENT
Start: 2022-08-15 | End: 2022-08-17 | Stop reason: HOSPADM

## 2022-08-15 RX ORDER — BISACODYL 10 MG
10 SUPPOSITORY, RECTAL RECTAL
Status: DISCONTINUED | OUTPATIENT
Start: 2022-08-15 | End: 2022-08-17 | Stop reason: HOSPADM

## 2022-08-15 RX ORDER — MORPHINE SULFATE 30 MG/1
30 TABLET, FILM COATED, EXTENDED RELEASE ORAL EVERY 8 HOURS
Status: DISCONTINUED | OUTPATIENT
Start: 2022-08-15 | End: 2022-08-15

## 2022-08-15 RX ORDER — POLYETHYLENE GLYCOL 3350 17 G/17G
1 POWDER, FOR SOLUTION ORAL
Status: DISCONTINUED | OUTPATIENT
Start: 2022-08-15 | End: 2022-08-17 | Stop reason: HOSPADM

## 2022-08-15 RX ORDER — MORPHINE SULFATE 30 MG/1
30 TABLET, FILM COATED, EXTENDED RELEASE ORAL EVERY 12 HOURS
Status: DISCONTINUED | OUTPATIENT
Start: 2022-08-15 | End: 2022-08-16

## 2022-08-15 RX ORDER — GABAPENTIN 100 MG/1
100 CAPSULE ORAL EVERY 8 HOURS
Status: DISCONTINUED | OUTPATIENT
Start: 2022-08-15 | End: 2022-08-16

## 2022-08-15 RX ADMIN — MORPHINE SULFATE 30 MG: 30 TABLET, FILM COATED, EXTENDED RELEASE ORAL at 04:29

## 2022-08-15 RX ADMIN — MORPHINE SULFATE 15 MG: 15 TABLET ORAL at 00:57

## 2022-08-15 RX ADMIN — SODIUM CHLORIDE, POTASSIUM CHLORIDE, SODIUM LACTATE AND CALCIUM CHLORIDE: 600; 310; 30; 20 INJECTION, SOLUTION INTRAVENOUS at 17:10

## 2022-08-15 RX ADMIN — MORPHINE SULFATE 15 MG: 15 TABLET ORAL at 17:10

## 2022-08-15 RX ADMIN — MORPHINE SULFATE 15 MG: 15 TABLET ORAL at 04:04

## 2022-08-15 RX ADMIN — ALLOPURINOL 200 MG: 100 TABLET ORAL at 04:29

## 2022-08-15 RX ADMIN — GABAPENTIN 100 MG: 100 CAPSULE ORAL at 15:11

## 2022-08-15 RX ADMIN — MORPHINE SULFATE 3 MG: 4 INJECTION INTRAVENOUS at 12:41

## 2022-08-15 RX ADMIN — POLYETHYLENE GLYCOL 3350 1 PACKET: 17 POWDER, FOR SOLUTION ORAL at 04:29

## 2022-08-15 RX ADMIN — SODIUM CHLORIDE 250 MG: 9 INJECTION, SOLUTION INTRAVENOUS at 07:43

## 2022-08-15 RX ADMIN — MORPHINE SULFATE 30 MG: 30 TABLET, FILM COATED, EXTENDED RELEASE ORAL at 15:10

## 2022-08-15 RX ADMIN — METOPROLOL TARTRATE 25 MG: 25 TABLET, FILM COATED ORAL at 04:29

## 2022-08-15 RX ADMIN — SENNOSIDES AND DOCUSATE SODIUM 2 TABLET: 50; 8.6 TABLET ORAL at 04:29

## 2022-08-15 RX ADMIN — METOPROLOL TARTRATE 25 MG: 25 TABLET, FILM COATED ORAL at 17:10

## 2022-08-15 RX ADMIN — MORPHINE SULFATE 15 MG: 15 TABLET ORAL at 07:43

## 2022-08-15 RX ADMIN — ALLOPURINOL 200 MG: 100 TABLET ORAL at 17:10

## 2022-08-15 ASSESSMENT — COGNITIVE AND FUNCTIONAL STATUS - GENERAL
DRESSING REGULAR LOWER BODY CLOTHING: A LOT
CLIMB 3 TO 5 STEPS WITH RAILING: A LOT
MOVING FROM LYING ON BACK TO SITTING ON SIDE OF FLAT BED: A LITTLE
TOILETING: A LITTLE
HELP NEEDED FOR BATHING: A LITTLE
SUGGESTED CMS G CODE MODIFIER DAILY ACTIVITY: CJ
DAILY ACTIVITIY SCORE: 20
WALKING IN HOSPITAL ROOM: A LOT
MOVING TO AND FROM BED TO CHAIR: A LITTLE
SUGGESTED CMS G CODE MODIFIER MOBILITY: CK
STANDING UP FROM CHAIR USING ARMS: A LITTLE
MOBILITY SCORE: 17

## 2022-08-15 ASSESSMENT — PAIN DESCRIPTION - PAIN TYPE
TYPE: ACUTE PAIN

## 2022-08-15 ASSESSMENT — ENCOUNTER SYMPTOMS
FEVER: 0
DIARRHEA: 0
NEUROLOGICAL NEGATIVE: 1
DEPRESSION: 1
SHORTNESS OF BREATH: 0
EYES NEGATIVE: 1
BACK PAIN: 1
WHEEZING: 0
CHILLS: 0
NAUSEA: 0
DIZZINESS: 0
DEPRESSION: 0
COUGH: 0
HEADACHES: 0
NERVOUS/ANXIOUS: 1
PALPITATIONS: 0
CONSTIPATION: 0
ABDOMINAL PAIN: 1
VOMITING: 0

## 2022-08-15 NOTE — CARE PLAN
The patient is Watcher - Medium risk of patient condition declining or worsening    Shift Goals  Clinical Goals: Pain control, palliative consult  Patient Goals: Rest  Family Goals: none present    Progress made toward(s) clinical / shift goals:        Problem: Pain - Standard  Goal: Alleviation of pain or a reduction in pain to the patient’s comfort goal  Outcome: Progressing  Note: Pt reports pain using 0-10 scale, medicated per MAR.      Problem: Knowledge Deficit - Standard  Goal: Patient and family/care givers will demonstrate understanding of plan of care, disease process/condition, diagnostic tests and medications  Outcome: Progressing  Note: Discussed POC with pt, plan for palliative care consult today, pt understands and agrees.

## 2022-08-15 NOTE — ASSESSMENT & PLAN NOTE
This is Sepsis Present on admission  SIRS criteria identified on my evaluation include: Tachycardia, with heart rate greater than 90 BPM, Tachypnea, with respirations greater than 20 per minute and Leukocytosis, with WBC greater than 12,000  Source is Suspected abdominal, possibly just gastroenteritis, symptoms of nausea, vomiting, abdominal pain.   Finished courses of cefuroxime (8/9 - 8/13) and flagyl (8/8 - 8/13)

## 2022-08-15 NOTE — PROGRESS NOTES
"Palliative Progress Note               Author: ISACC Dover Date & Time created: 8/15/2022  11:03 AM     Reason for subsequent visit: Cancer related pain    Interval History:  Collaborated with Dr. Guerrero and [facility] team as well as ASMITA Roy prior to meeting with patient.    Patient reports abdominal pain primarily in his RUQ currently 5/10 severity reaches 9/10 severity with movement. His low back pain is chronically 6/10 severity and also jumps to 9/10 severity with movement. Patient visibly stiff/uncomfortable with repositioning himself in bed. He reports his knee swelling/BLE extremity swelling has resolved.  Patient reports \"charley horses in my legs and in my back.\"  He attributes this to his chronic back pain being too weak to ambulate more stretching.  He had some constipation for several days followed by loose stools.  He denies nausea.  He is only eating about 25% or less of meals.  He denies nausea but confirms he is not hungry and needs to force himself to eat.  He reports his mood has been \"okay\" though he is stressed because his wife is stressed about decisions.    Review of Systems:  Negative for agitation. Negative for delirium. Negative for dyspnea. Positive for pain. Positive for anorexia. Positive for fatigue. Negative for sedation. Negative for anxiety. Negative for depression. Negative for insomnia. Negative for diarrhea. Negative for nausea and vomiting. Negative for constipation. Negative for dysphagia.    Physical Exam:    Recent vital signs  Temp (24hrs), Av °C (98.6 °F), Min:36.7 °C (98.1 °F), Max:37.2 °C (98.9 °F)  Temperature: 37.1 °C (98.8 °F)  Pulse  Av.7  Min: 65  Max: 206   Blood Pressure : 105/63       Physical Exam  Vitals and nursing note reviewed.   HENT:      Mouth/Throat:      Mouth: Mucous membranes are dry.      Pharynx: Oropharynx is clear.   Eyes:      Pupils: Pupils are equal, round, and reactive to light.   Cardiovascular:      Rate and " Rhythm: Normal rate and regular rhythm.   Pulmonary:      Effort: Pulmonary effort is normal. No respiratory distress.      Breath sounds: Decreased breath sounds (lower fields) present.   Abdominal:      General: Bowel sounds are decreased. There is no distension.      Palpations: Abdomen is soft.      Tenderness: There is abdominal tenderness in the right upper quadrant and right lower quadrant.   Musculoskeletal:         General: Tenderness (low back) present.   Skin:     Capillary Refill: Capillary refill takes less than 2 seconds.      Coloration: Skin is pale.   Neurological:      General: No focal deficit present.      Mental Status: He is alert. Mental status is at baseline.   Psychiatric:         Mood and Affect: Mood normal.         Behavior: Behavior normal.         Thought Content: Thought content normal.         Judgment: Judgment normal.     Recent Labs     08/13/22  0525 08/14/22  0042 08/15/22  0222   SODIUM 132* 133* 134*   POTASSIUM 3.4* 3.4* 4.1   CHLORIDE 98 98 103   CO2 25 25 23   GLUCOSE 96 111* 102*   BUN 9 9 7*   CREATININE 0.61 0.61 0.45*   CALCIUM 8.5 8.3* 8.2*     Recent Labs     08/13/22  0525 08/14/22  0042 08/15/22  0222   WBC 8.6 9.3 9.6   RBC 3.20* 3.10* 2.99*   HEMOGLOBIN 7.1* 7.0* 6.8*   HEMATOCRIT 25.2* 24.6* 24.2*   MCV 78.8* 79.4* 80.9*   MCH 22.2* 22.6* 22.7*   MCHC 28.2* 28.5* 28.1*   RDW 62.4* 63.3* 65.2*   PLATELETCT 426 436 384   MPV 8.2* 8.2* 8.1*       Imaging/Procedures Review:      Medications reviewed. Labs Reviewed.     Assessment/Plan:  #Synchronous colon and rectal cancers (active)  Oncology and surgery have consulted  Pending MRI  Care team recommending transfer to [facility] due to insurance coverage  [facility] has accepted  Patient still considering options (hospice versus transfer to [facility])  His first choice would be to transfer insurance to [place] and receive oncologic care here however this might present significant delays affecting his prognosis and treatment  options     #Cancer related abdominal pain (active)  MEDD (morphine equivalent daily dose) is approximately 165 mg:  - morphine ER 30 mg x 2 doses = 60 mg  - morphine IR  15 mg x 7 doses = 105 mg  - morphine IV - NONE     8/13 ~ 105 mg    CONTINUOUS OPIOID THERAPY  Patient warrants considering cancer related pain/opioid usage  Oxycodone CR discontinued as it is non formulary for Medicare  Continue morphine ER 30 mg PO BID (consider up-titration as needed)     SHORT ACTING OPIOID THERAPY  Prefer single opioid regiment to ensure morphine is working adequately and to preserve opioid receptors for future opioid rotation  Continue morphine IR 7.5-15 mg PO Q 3 hours PRN mod-severe breakthrough pain  Continue morphine 3 mg IV Q 3 hours PRN severe pain unrelieved by oral opioids - requested RN provide dose     ADJUNCT OPIOID THERAPY  Start gabapentin 100 mg PO Q 8 hours     NON PHARMACOLOGIC THERAPY   Calming environment  Thermotherapy ineffective  Adequate communication regarding pain management plan/efforts    #Constipation (controlled)  Last bowel movement  8/14 (loose)  Continue senna-docusate (Pericolace or Senakot S) 8.6-50 mg 2 tabs PO BID     If no bowel movement in 1-2 days escalate in the following order:  - polyethylene glycol/lytes (MiraLAX) 17 gram packet PO daily PRN  - magnesium hydroxide (Milk of Magnesia) 30 mL PO daily PRN  - bisacodyl (Dulcolax) suppository 10 mg RI daily PRN     #Protein calory malnutrition & weight loss (active)  Discussed appetite stimulant to aid in appetite/nausea control  Patient declined olanzapine/dronabinol   He is working in increasing calorie intake  Eating approximately 25% of meals    #Sepsis (present on admission)  Finished course of antibiotics     #Anemia (present on admission)  Likely secondary to chronic GI losses   Transfusion parameters in place  Iron infusion per oncology     #Hypokalemia (present on admission)  Placement and monitoring per primary team     #Chronic  pain due to back injury and osteoarthritis (present on admission)  Continue home allopurinol  Cancer related pain management will likely help chronic pain     #SVT/elevated troponin, lactic acidosis, MICHAEL (present on admission)  resolved     Code Status: DNR/DNI    Advance Care Planning/Current Goals of Care: Met with patient, patient's wife Kaylee, and [facility] team (Dr. Guerrero/[facility]).   Dr. Subramanian and her discussed current medical issues, clinical course, and care options.  Patient was able to have all questions answered and requested further time with myself and his wife.  I provided information on follow-up questions from our meeting on Saturday.  Discussed that transport would be via [facility].  Patient's wife inquired if it would be [facility] versus [facility] as they would prefer [facility] and would likely declined going to [facility] based on previous experiences.    Patient discussed his thoughts, concerns, and feelings with myself and his wife.  He confirmed he has thought about options and decisions.  His biggest worry is that he will spend months undergoing surgeries and treatment which may not be why he wants to spend the time he has left.  He feels even if he had stage I disease, which he feels is unlikely due to lymphadenopathy, he does not wish to undergo surgical intervention.  He confirms he does not have far to go regarding pain control as he is already experiencing significant chronic pain as well as cancer related pain.  He shared that based on past experiences regarding the surgery recovery and current functional, patient does not want to undergo surgery or cancer treatment.  He would prefer to spend his time in [place] focusing on his comfort.    Based on this we discussed that hospice is likely the most beneficial type of care he can receive to help with his quality of life and allow him to spend time at home versus in the hospital doing the things he enjoys such as  "bending time with his wife and plantar.  Again discussed barriers may include insurance coverage of hospice in the [place] area.  At this time  Maida Roy arrived. Provided overview of above. Appreciate her discussion and follow-up care.  Patient's wife expressed she will need some air.    Patient's wife waiting outside patient's room.  She confirms she feels that her whole world is \"turned upside down.\"  I affirmed/validated her feelings as this is a very difficult situation compounded by prior loss.  Provided contact information and encouraged her to call with any questions or needs.  Offered to escort her to the garden or the Chapel however she preferred sitting in the foyer.  50 minutes was spent discussing advance care planning.     Updated: Dr. Guerrero    35 minutes spent with greater than 50% spent counseling and coordinating the patient's care regarding symptom management.   Please refer to HPI and assessment/plan for details.     CHRISTIANO Sagastume.  Palliative Care Nurse Practitioner  [phone]         "

## 2022-08-15 NOTE — NON-PROVIDER
Daily Progress Note:     Date of Service: 8/15/2022  Primary Team: [facility] Green Team and [facility]  Purple Team   Attending: Jamar Guerrero M.D.   Senior Resident: Dr. Clover Ingram   Intern: Dr. Sol Barton  Contact:  [phone]    ID:   Everett is a 72 y.o. male with a PMHx of HTN, HLP, MDD, and DB who presented in the ER on 8/5/2022 with abdominal pain that has been ongoing since March 2022. Patient underwent colonoscopy with biopsy that revealed adenocarcinoma of the colon along with cancer of the rectum.     Chief Complaint:   Abdominal pain and nausea    Interval Events:  Patient states that he currently feels fine upon discussion this morning. He notes that he experiences abdominal pain with abrupt movements. Otherwise he has not experienced any pain. Patient is still contemplating his treatment options with his wife. He states that he just needs more time to think about further care before making a decision. Along with his abdominal pain, he is experiencing back pain. He currently denies any fever, chills, chest pain, palpitations, cough, nausea, vomiting, or hematuria.      Consultants/Specialty:  General Surgery, Colorectal Surgery, Palliative Care, Oncology, Gastroenterology    Review of Systems:    Review of Systems   Constitutional:  Negative for chills and fever.   HENT: Negative.     Eyes: Negative.    Respiratory:  Negative for cough, shortness of breath and wheezing.    Cardiovascular:  Negative for chest pain, palpitations and leg swelling.   Gastrointestinal:  Positive for abdominal pain. Negative for nausea and vomiting.   Genitourinary:  Negative for dysuria and hematuria.   Musculoskeletal:  Positive for back pain and joint pain.   Skin:  Negative for rash.   Neurological: Negative.    Endo/Heme/Allergies: Negative.    Psychiatric/Behavioral:  Positive for depression.      Objective Data:   Physical Exam:   Vitals:   Temp:  [36.7 °C (98.1 °F)-37.1 °C (98.8 °F)] 37 °C (98.6  °F)  Pulse:  [] 76  Resp:  [17-18] 18  BP: ()/(50-72) 111/68  SpO2:  [94 %-97 %] 94 %  Physical Exam  Constitutional:       General: He is not in acute distress.     Appearance: Normal appearance. He is not ill-appearing, toxic-appearing or diaphoretic.   HENT:      Head: Normocephalic and atraumatic.      Right Ear: External ear normal.      Left Ear: External ear normal.      Nose: Nose normal. No rhinorrhea.      Mouth/Throat:      Mouth: Mucous membranes are moist.      Pharynx: Oropharynx is clear. No oropharyngeal exudate.   Eyes:      Extraocular Movements: Extraocular movements intact.      Conjunctiva/sclera: Conjunctivae normal.      Pupils: Pupils are equal, round, and reactive to light.   Neck:      Comments: There is surgical gauze and tape overlying the right SCM muscle. Dressing appears clean, dry, and intact.   Cardiovascular:      Rate and Rhythm: Normal rate and regular rhythm.   Pulmonary:      Effort: Pulmonary effort is normal. No respiratory distress.      Breath sounds: Normal breath sounds. No stridor. No wheezing, rhonchi or rales.   Chest:      Chest wall: No tenderness.   Abdominal:      Comments: Decreased bowel sounds in the LLQ and LUQ. There is a solid mass palpated in the right side of the abdomen with moderate tenderness to palpation.    Musculoskeletal:         General: Normal range of motion.      Cervical back: Normal range of motion and neck supple.   Skin:     General: Skin is warm and dry.      Coloration: Skin is not jaundiced.   Neurological:      General: No focal deficit present.      Mental Status: He is alert and oriented to person, place, and time.      Cranial Nerves: No cranial nerve deficit.      Sensory: No sensory deficit.   Psychiatric:         Mood and Affect: Mood normal.         Behavior: Behavior normal.         Thought Content: Thought content normal.         Judgment: Judgment normal.         Labs:   Recent Labs     08/13/22  0525 08/14/22  0042  08/15/22  0222   WBC 8.6 9.3 9.6   RBC 3.20* 3.10* 2.99*   HEMOGLOBIN 7.1* 7.0* 6.8*   HEMATOCRIT 25.2* 24.6* 24.2*   MCV 78.8* 79.4* 80.9*   MCH 22.2* 22.6* 22.7*   RDW 62.4* 63.3* 65.2*   PLATELETCT 426 436 384   MPV 8.2* 8.2* 8.1*   NEUTSPOLYS 74.90* 72.60* 69.30   LYMPHOCYTES 9.10* 12.30* 15.80*   MONOCYTES 13.00 11.70 11.40   EOSINOPHILS 2.00 2.40 2.40   BASOPHILS 0.50 0.50 0.60     Recent Labs     08/13/22  0525 08/14/22  0042 08/15/22  0222   SODIUM 132* 133* 134*   POTASSIUM 3.4* 3.4* 4.1   CHLORIDE 98 98 103   CO2 25 25 23   GLUCOSE 96 111* 102*   BUN 9 9 7*         Imaging:   [id number] VIEW   Final Result      Normal bowel gas pattern with a small colonic stool burden.      IR-INSERT IVC FILTER WITH IG & SI   Final Result         1. ULTRASOUND AND FLUOROSCOPIC GUIDED PLACEMENT OF A OPTION ELITE CAVAL FILTER DEPLOYED IN THE INFRARENAL IVC.      2. INFERIOR VENA CAVAGRAM WITHIN NORMAL LIMITS WITH NO EVIDENCE OF CAVAL THROMBUS OR OCCLUSION.      US-EXTREMITY VENOUS LOWER BILAT   Final Result      DX-KNEE COMPLETE 4+ RIGHT   Final Result      1.  No evidence of acute fracture or dislocation.   2.  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   3.  Moderate joint effusion.   4.  Chondrocalcinosis.         EC-ECHOCARDIOGRAM COMPLETE W/O CONT   Final Result      CT-ABDOMEN-PELVIS WITH   Final Result      1.  Large cecal mass consistent with colon cancer.      2.  Metastatic lymphadenopathy in the right lower quadrant mesentery and possibly in the retroperitoneum.      3.  Small focus of extraluminal gas identified posterior to the large mass in the right lower quadrant.      4.  Intrahepatic and extra hepatic biliary duct dilatation possibly related to previous cholecystectomy and patient age. Recommend correlation with liver function tests.      5.  Nonobstructive renal stones.      6.  This was discussed with Dr. Bennett at 4:20 PM.      CT-CTA CHEST PULMONARY ARTERY W/ RECONS   Final Result         1.  No CT evidence of pulmonary embolism.   2. Patchy right lower lung opacities, likely atelectasis.   3. No pleural effusion or pneumothorax.         DX-CHEST-PORTABLE (1 VIEW)   Final Result         1. No acute cardiopulmonary abnormalities are identified.      CT-CHEST,ABDOMEN,PELVIS W/O   Final Result      Limited exam due to lack of oral or IV contrast.      1. Large mass involving the cecum and ascending colon, in keeping with primary colon cancer.         2.  Multiple enlarged right lower quadrant mesenteric lymph nodes, likely metastasis. Several mildly prominent retroperitoneal lymph nodes, indeterminate.      3. Nonobstructive left renal calculus.      4. Dilated CBD and intrahepatic bile duct could relate to post cholecystectomy status. Correlate with LFTs.      DX-CHEST-PORTABLE (1 VIEW)   Final Result         1. No acute cardiopulmonary abnormalities are identified.      MR-PELVIS-WITH & W/O AND SEQUENCES    (Results Pending)       Problem Representation:  Everett Peters is a 72 YOM with a PMHx of HTN, HLP, MDD, and DB and was admitted on 8/5/22 for abdominal pain, nausea, and vomiting. Patient underwent a colonoscopy with biopsy on 8/7/22 which subsequently revealed grade III adenocarcinoma of the colon and rectal cancer as well. On 8/11/22 patient was found to have a DVT in the RLE and opted for IVC filter placement. Patient currently experiences abdominal pain with movement and back pain along with joint pain.       Assessment/Plan:     Colon Cancer and Rectal Cancer  Patient underwent colonoscopy and biopsy on 8/5/22 which revealed grade III adenocarcinoma of the colon along with rectal cancer. MRI will be necessary for further staging of rectal cancer. Patient and his wife spoke with Danay Acevedo (palliative care) about his thoughts of treatment options. Per palliative care note, patient has opted to forgo surgical intervention and would like to focus his efforts on comfort care. Plan at this time  includes advancing with comfort care and discussing which options are available for patient at this time.    2. Anemia  Etiology is likely secondary to patient's colon cancer. The most recent CBC revealed a Hb level of 6.8 (was 7.0 yesterday). Consequently patient was given 1 unit of pRBC. Plan includes continuing to monitor CBC with transfusion protocol in place.     3. Constipation  Etiology is likely due to tumor burden in the colon and rectum which is causing obstruction. Abdominal X Ray on 8/12/22 revealed normal bowel gas pattern with small colonic stool burden. . Bowel protocol is in place and patient is receiving Miralax, Dulcolax, and milk of magnesia.      4. Hypokalemia  Patient's most recent CMP revealed K of 4.1 (was 3.4 yesterday). Plan includes continuing serial CMP checks with electrolyte replenishment if needed.     5. DVT  Ultrasound of the RLE on 8/11/22 revealed DVT. Patient received IVC filter placement and is currently not on any anticoagulation medication due to possibility of increased bleeding. Patient's condition is currently stable.     6. Chronic Pain  This problem is chronic in duration. Plan includes to increase patient's allopurinol dosage to 300 mg BID.    7. Supraventricular Tachycardia  While in the ED on 8/5/22, patient experienced an episode of SVT. He was given 25 mg IV diltiazem with cardioversion into sinus rhythm. Patient was placed on telemetry and has been receiving metoprolol 25 mg BID. Patient has not experienced any SVT since. Plan includes continuing current metoprolol regimen.     8. Lactic acidosis  On 8/11/22 patient had a lactic acid level of 2.5. Repeat measure on 8/12/22 revealed lactic acid level of 1.4 after patient was given LR solution. This issue has since resolved.     VTE prophylaxis: SCD/ANITHA  Dispo: Patient will be kept in the hospital overnight.

## 2022-08-15 NOTE — ASSESSMENT & PLAN NOTE
CT shows large mass that is suspected primary colonic cancer. GI and colorectal surgery following. 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 General surgery Dr. Bennett following (pending MRI with rectal protocol for staging and surgical intervention. This will be performed on Joann 3 magnet MRI, outpatient location). Oncology following, appreciate recs. Palliative has talked with pt and wife    -Palliative and primary team will meet with patient and wife on 8/15 in order to discuss whether patient wants to pursue treatment (which will likely need to be done outside of [facility] due to patient being unable to obtain Joann 3 magnet MRI for staging here) versus palliative care here at [facility]  -[facility] at 7 today, likely secondary to bleeding from colonic neoplasms.  We will continue to monitor and transfuse as appropriate

## 2022-08-15 NOTE — CARE PLAN
The patient is Watcher - Medium risk of patient condition declining or worsening    Shift Goals  Clinical Goals: pain control, safety  Patient Goals: rest  Family Goals: none present    Progress made toward(s) clinical / shift goals:    Problem: Pain - Standard  Goal: Alleviation of pain or a reduction in pain to the patient’s comfort goal  Outcome: Progressing     Problem: Hemodynamics  Goal: Patient's hemodynamics, fluid balance and neurologic status will be stable or improve  Outcome: Progressing     Problem: Urinary - Renal Perfusion  Goal: Ability to achieve and maintain adequate renal perfusion and functioning will improve  Outcome: Progressing     Problem: Respiratory  Goal: Patient will achieve/maintain optimum respiratory ventilation and gas exchange  Outcome: Progressing     Problem: Skin Integrity  Goal: Skin integrity is maintained or improved  Outcome: Progressing     Problem: Fall Risk  Goal: Patient will remain free from falls  Outcome: Progressing       Patient is not progressing towards the following goals:

## 2022-08-16 PROCEDURE — A9270 NON-COVERED ITEM OR SERVICE: HCPCS | Performed by: NURSE PRACTITIONER

## 2022-08-16 PROCEDURE — A9270 NON-COVERED ITEM OR SERVICE: HCPCS

## 2022-08-16 PROCEDURE — A9270 NON-COVERED ITEM OR SERVICE: HCPCS | Performed by: STUDENT IN AN ORGANIZED HEALTH CARE EDUCATION/TRAINING PROGRAM

## 2022-08-16 PROCEDURE — 700102 HCHG RX REV CODE 250 W/ 637 OVERRIDE(OP): Performed by: STUDENT IN AN ORGANIZED HEALTH CARE EDUCATION/TRAINING PROGRAM

## 2022-08-16 PROCEDURE — 700102 HCHG RX REV CODE 250 W/ 637 OVERRIDE(OP): Performed by: HOSPITALIST

## 2022-08-16 PROCEDURE — 770020 HCHG ROOM/CARE - TELE (206)

## 2022-08-16 PROCEDURE — A9270 NON-COVERED ITEM OR SERVICE: HCPCS | Performed by: HOSPITALIST

## 2022-08-16 PROCEDURE — 700102 HCHG RX REV CODE 250 W/ 637 OVERRIDE(OP)

## 2022-08-16 PROCEDURE — 700105 HCHG RX REV CODE 258: Performed by: STUDENT IN AN ORGANIZED HEALTH CARE EDUCATION/TRAINING PROGRAM

## 2022-08-16 PROCEDURE — 99232 SBSQ HOSP IP/OBS MODERATE 35: CPT | Mod: GC | Performed by: HOSPITALIST

## 2022-08-16 PROCEDURE — 700102 HCHG RX REV CODE 250 W/ 637 OVERRIDE(OP): Performed by: NURSE PRACTITIONER

## 2022-08-16 PROCEDURE — 99232 SBSQ HOSP IP/OBS MODERATE 35: CPT | Performed by: NURSE PRACTITIONER

## 2022-08-16 PROCEDURE — 99497 ADVNCD CARE PLAN 30 MIN: CPT | Mod: 25 | Performed by: NURSE PRACTITIONER

## 2022-08-16 RX ORDER — GABAPENTIN 100 MG/1
200 CAPSULE ORAL NIGHTLY
Status: DISCONTINUED | OUTPATIENT
Start: 2022-08-16 | End: 2022-08-17 | Stop reason: HOSPADM

## 2022-08-16 RX ORDER — HYDROMORPHONE HYDROCHLORIDE 1 MG/ML
0.5 INJECTION, SOLUTION INTRAMUSCULAR; INTRAVENOUS; SUBCUTANEOUS
Status: DISCONTINUED | OUTPATIENT
Start: 2022-08-16 | End: 2022-08-17 | Stop reason: HOSPADM

## 2022-08-16 RX ORDER — OXYCODONE HYDROCHLORIDE 10 MG/1
10 TABLET ORAL
Status: DISCONTINUED | OUTPATIENT
Start: 2022-08-16 | End: 2022-08-17 | Stop reason: HOSPADM

## 2022-08-16 RX ORDER — METOPROLOL SUCCINATE 50 MG/1
50 TABLET, EXTENDED RELEASE ORAL
Status: DISCONTINUED | OUTPATIENT
Start: 2022-08-17 | End: 2022-08-17 | Stop reason: HOSPADM

## 2022-08-16 RX ORDER — MORPHINE SULFATE 15 MG/1
15 TABLET, FILM COATED, EXTENDED RELEASE ORAL EVERY 8 HOURS
Status: DISCONTINUED | OUTPATIENT
Start: 2022-08-16 | End: 2022-08-17 | Stop reason: HOSPADM

## 2022-08-16 RX ADMIN — ALLOPURINOL 200 MG: 100 TABLET ORAL at 05:51

## 2022-08-16 RX ADMIN — MORPHINE SULFATE 15 MG: 15 TABLET, EXTENDED RELEASE ORAL at 21:20

## 2022-08-16 RX ADMIN — ALLOPURINOL 200 MG: 100 TABLET ORAL at 17:15

## 2022-08-16 RX ADMIN — DOCUSATE SODIUM 50 MG AND SENNOSIDES 8.6 MG 2 TABLET: 8.6; 5 TABLET, FILM COATED ORAL at 17:15

## 2022-08-16 RX ADMIN — METOPROLOL TARTRATE 25 MG: 25 TABLET, FILM COATED ORAL at 05:51

## 2022-08-16 RX ADMIN — OXYCODONE HYDROCHLORIDE 10 MG: 10 TABLET ORAL at 13:43

## 2022-08-16 RX ADMIN — MORPHINE SULFATE 30 MG: 30 TABLET, FILM COATED, EXTENDED RELEASE ORAL at 05:51

## 2022-08-16 RX ADMIN — SODIUM CHLORIDE, POTASSIUM CHLORIDE, SODIUM LACTATE AND CALCIUM CHLORIDE: 600; 310; 30; 20 INJECTION, SOLUTION INTRAVENOUS at 02:14

## 2022-08-16 RX ADMIN — OXYCODONE HYDROCHLORIDE 15 MG: 10 TABLET ORAL at 20:09

## 2022-08-16 RX ADMIN — MORPHINE SULFATE 15 MG: 15 TABLET ORAL at 02:13

## 2022-08-16 RX ADMIN — GABAPENTIN 200 MG: 100 CAPSULE ORAL at 21:19

## 2022-08-16 RX ADMIN — MORPHINE SULFATE 15 MG: 15 TABLET ORAL at 09:16

## 2022-08-16 RX ADMIN — OXYCODONE HYDROCHLORIDE 15 MG: 10 TABLET ORAL at 17:15

## 2022-08-16 RX ADMIN — GABAPENTIN 100 MG: 100 CAPSULE ORAL at 05:51

## 2022-08-16 RX ADMIN — MORPHINE SULFATE 15 MG: 15 TABLET, EXTENDED RELEASE ORAL at 13:43

## 2022-08-16 ASSESSMENT — ENCOUNTER SYMPTOMS
NAUSEA: 0
CHILLS: 0
WHEEZING: 0
ABDOMINAL PAIN: 1
SHORTNESS OF BREATH: 0
SINUS PAIN: 0
BACK PAIN: 1
DEPRESSION: 0
VOMITING: 0
CONSTIPATION: 0
EYE REDNESS: 0
BRUISES/BLEEDS EASILY: 0
BACK PAIN: 0
DIZZINESS: 0
PSYCHIATRIC NEGATIVE: 1
PALPITATIONS: 0
SORE THROAT: 0
COUGH: 0
FEVER: 0
DIARRHEA: 0
HEADACHES: 0
EYE DISCHARGE: 0

## 2022-08-16 ASSESSMENT — PAIN DESCRIPTION - PAIN TYPE
TYPE: ACUTE PAIN
TYPE: ACUTE PAIN
TYPE: ACUTE PAIN;CHRONIC PAIN
TYPE: ACUTE PAIN

## 2022-08-16 NOTE — DISCHARGE PLANNING
Received Choice form at 1155  Agency/Facility Name: [facility]  Referral sent per Choice form at 1157    Spoke To: Wendie  Agency/Facility Name: [facility]  Plan or Request: referral in review

## 2022-08-16 NOTE — PROGRESS NOTES
Palliative Progress Note               Author: ISCAC [place] Date & Time created: 2022  1:20 PM     Reason for subsequent visit: Cancer related pain    Interval History:  Patient's wife not currently present.  Patient reports she is working with [employer] on hospice discharge plan.  Overall patient feels okay.  He reports some drowsiness.  He would prefer to be more alert during the day.  He continues to have some end dose failure at about 8 hours with long-acting morphine.  He is requesting to resume oxycodone/Dilaudid for breakthrough pain as he feels that it does not make him as drowsy/wears off more predictably.  He confirmed that he spoke with [facility] physician about long-acting morphine and they are planning to decrease the dose.  We discussed possibility of lowered morning dose and higher evening dose to help with sleep and through pain throughout the night.  Patient had a significant bowel movement yesterday.  Here is asking for a different iPad as he is listening to Scientology program.  Provided new iPad from [facility] and charge the one previously used at [facility] charging station.     Review of Systems:  Negative for delirium. Negative for dyspnea. Positive for pain (abdomen; back). Positive for anorexia. Positive for fatigue. Positive for sedation (mild per patient). Negative for anxiety. Negative for depression. Negative for insomnia. Negative for nausea and vomiting. Negative for constipation.    Physical Exam:    Recent vital signs  Temp (24hrs), Av.8 °C (98.2 °F), Min:36.5 °C (97.7 °F), Max:37 °C (98.6 °F)  Temperature: 36.5 °C (97.7 °F)  Pulse  Av  Min: 65  Max: 206   Blood Pressure : 128/64       Physical Exam  Vitals and nursing note reviewed.   HENT:      Mouth/Throat:      Mouth: Mucous membranes are dry.      Pharynx: Oropharynx is clear.   Eyes:      Pupils: Pupils are equal, round, and reactive to light.   Cardiovascular:      Rate and Rhythm: Normal rate and regular rhythm.    Pulmonary:      Effort: Pulmonary effort is normal. No respiratory distress.      Breath sounds: Decreased breath sounds: lower fields.   Abdominal:      General: Bowel sounds are decreased. There is no distension.      Tenderness: There is abdominal tenderness in the right upper quadrant.   Musculoskeletal:         General: Tenderness (low back/BLE) present.   Skin:     Capillary Refill: Capillary refill takes less than 2 seconds.      Coloration: Skin is pale.   Neurological:      General: No focal deficit present.      Mental Status: He is alert. Mental status is at baseline.   Psychiatric:         Mood and Affect: Mood normal.         Behavior: Behavior normal.         Thought Content: Thought content normal.         Judgment: Judgment normal.       Medications reviewed.     Assessment/Plan:  #Synchronous colon and rectal cancers (active)  Oncology and surgery have consulted  Pending MRI  Care team recommending transfer to [facility] due to insurance coverage  [facility] has accepted for outpatient services  Patient confirmed wishes for hospice care in [place]     #Cancer related abdominal pain (active)  MEDD (morphine equivalent daily dose) is approximately 99 mg:  - morphine ER 30 mg x 2 doses = 60 mg  - morphine IR  15 mg x 2 doses =  30 mg  - morphine IV 3 mg x 1 dose = 3 mg x 3 ~ 9 mg     8/15 ~ 165 mg  8/13 ~ 105 mg    CONTINUOUS OPIOID THERAPY  In agreement with increased frequency and decreased dose of morphine ER 15 mg Q 8 hours (discussed ordering provider Dr. Felix Perla)  Quested to round with [facility] team to collaborate for pain management     SHORT ACTING OPIOID THERAPY  Discontinue short acting morphine as patient feels oxycodone/hydromorphone better and wore off more predictably  Restart oxycodone 10-15 mg PO Q 3 hours PRN mod-sever break through pain  Restart for more phone 0.5 mg IV Q 3 hours PRN pain unrelieved by oral pain regimen    ADJUNCT OPIOID THERAPY  Change gabapentin to 200 mg PO HS to improve  day time alertness     NON PHARMACOLOGIC THERAPY   Calming environment  Thermotherapy ineffective  Adequate communication regarding pain management plan/efforts    #Constipation (controlled)  Last bowel movement  8/15  Continue senna-docusate (Pericolace or Senakot S) 8.6-50 mg 2 tabs PO BID     If no bowel movement in 1-2 days escalate in the following order:  - polyethylene glycol/lytes (MiraLAX) 17 gram packet PO daily PRN  - magnesium hydroxide (Milk of Magnesia) 30 mL PO daily PRN  - bisacodyl (Dulcolax) suppository 10 mg NC daily PRN     #Protein calory malnutrition & weight loss (active)  Discussed appetite stimulant to aid in appetite/nausea control  Patient declined olanzapine/dronabinol   He is working in increasing calorie intake as tolerated    #Sepsis (present on admission)  Finished course of antibiotics     #Anemia (present on admission)  Likely secondary to chronic GI losses   Transfusion parameters in place  Iron infusion per oncology     #Hypokalemia (present on admission)  Placement and monitoring per primary team     #Chronic pain due to back injury and osteoarthritis (present on admission)  Continue home allopurinol  Cancer related pain management will likely help chronic pain     #SVT/elevated troponin, lactic acidosis, MICHAEL (present on admission)  resolved     Code Status: DNR/DNI    Advance Care Planning/Current Goals of Care: Reassured patient I will collaborate with his primary team regarding pain management needs.  Provided new charge iPad so patient is able to continue listening to Confucianist program as it brings some comfort.  Recommended completion of POLST form prior to discharge to outline patient's wishes as medical orders for any provider of health care to follow.  He would prefer to review and discuss with his wife present.  He confirmed his wishes are to return home and spend Quality time with his wife and practices guitar.  Discussed we will adjust medications to help find a good  balance of pain control and alertness.  Discussed that hospice team can continue this at home.    I did share that some of his symptoms can be related to his disease process and may change over time.  He confirms he anticipates this.  He denied having any other questions or needs at this time.  Provided therapeutic communication including open-ended questions, therapeutic presence/silence, reflective listening, and validation of thoughts/feelings throughout encounter.  17 minutes spent discussing advance care planning.     25 minutes spent with greater than 50% spent counseling and coordinating the patient's care regarding symptom management.   Please refer to HPI and assessment/plan for details.     Danay Acevedo A.P.R.N.  Palliative Care Nurse Practitioner  [phone]

## 2022-08-16 NOTE — CARE PLAN
Problem: Pain - Standard  Goal: Alleviation of pain or a reduction in pain to the patient’s comfort goal  Outcome: Progressing     Problem: Knowledge Deficit - Standard  Goal: Patient and family/care givers will demonstrate understanding of plan of care, disease process/condition, diagnostic tests and medications  Outcome: Progressing     Problem: Skin Integrity  Goal: Skin integrity is maintained or improved  Outcome: Progressing   The patient is Stable - Low risk of patient condition declining or worsening    Shift Goals  Clinical Goals: Rest  Patient Goals: Rest  Family Goals: none present    Progress made toward(s) clinical / shift goals:  Pt resting. Denies pain at this time.     Patient is not progressing towards the following goals:

## 2022-08-16 NOTE — DISCHARGE PLANNING
Case Management Discharge Planning    Admission Date: 8/5/2022  GMLOS: 3.5  ALOS: 11    6-Clicks ADL Score: 20  6-Clicks Mobility Score: 17  PT and/or OT Eval ordered: Yes  Post-acute Referrals Ordered: Yes  Post-acute Choice Obtained: Yes  Has referral(s) been sent to post-acute provider:  Yes      Anticipated Discharge Dispo: Discharge Disposition: D/T to another type of health care inst. (70)    DME Needed: No    Action(s) Taken: Updated Provider/Nurse on Discharge Plan    Escalations Completed: None    Medically Clear: Yes    Next Steps: Team met to review status and discharge planning. SW spoke to patient and spouse to discuss all aspects of discharge planning to include coordination of hospice service.Agencies reviewed and [facility] was selected for provider. Referral was sent to the agency. Insurance was verified and hospice is [place] in this state. Liaison will be out to this setting today at 3:30pm to discuss and review paperwork. SW will follow for discharge planning.     ASMITA also requested that a consult for spiritual team visit be inputted.     UPDATE 1625- ASMITA received a call from Gerald at [facility] in the CM department. Gerald stated that he will send a consult to the MD for the hospice order. ASMITA informed Gerald that a referral had been made to [facility] and the agency was meeting with the patient/spouse tonight.     Barriers to Discharge: None    Is the patient up for discharge tomorrow: No

## 2022-08-16 NOTE — PROGRESS NOTES
Daily Progress Note:     Date of Service: 8/15/2022  Primary Team: [facility] Team   Attending: Jamar Guerrero M.D.   Senior Resident: Dr. Ingram  Intern: Dr. Barton  Contact:  [phone]  Chief Complaint  Nausea, vomiting, abdominal pain     Hospital Course  73 y/o man with hx of HTN, HLD, depression and generalized anxiety disorder previously on Remeron and buspirone, gout, and chronic back pain admitted 8/5/2022 with nausea, vomiting, and abdominal pain. Found to be in SVT in ED with HR up to 184. Resolved after IV fluids and diltiazem. Labs significant for wbc 23438, hgb 6.9, platelets 965, bicarb 14, BUN 28, creatinine 1.47, BNP 3728, and troponin 29. CT abdomen showed large mass in cecum and ascending colon with enlarged RLQ mesenteric lymph nodes. Colonoscopy done which showed significant mass burden involving ileocecal valve in addition to mass proximal to anal verge. Surgery recommending MRI on 3 Joann magnet with rectal cancer protocol and outpatient f/u. Biopsy showed invasive poorly differentiated adenocarcinoma and tubular adenoma with high grade dysplasia. Oncology has seen with recs given. Pending palliative eval. PT suggests post acute care placement. Now with DVT in R peroneal. IVC filter placed 8/12. After meeting with palliative care on 8/15, patient has decided upon home hospice.     Interval Problem Update  Overnight received 1 U pRBC due to H 6.8  Seen on 8/15,  overall he is feeling well with pain in his abdomen and back. He has met with palliative care today and has decided on home hospice. Patient states he does not want to undergo surgical treatment or go to [facility] for imaging. He feels that the move will be too much for him. He wants to spend the rest of of his time in [place] and not in the hospital recovering from surgery. He believes in God and that if it is his time to go then that is God's will. He denies dizziness or shortness of breath or chest pain. Does get  lightheaded when he ambulates at times.     I have discussed this patient's plan of care and discharge plan at IDT rounds today with Case Management, Nursing, Nursing leadership, and other members of the IDT team.     Consultants/Specialty  general surgery, GI, oncology, and palliative care     Code Status  DNAR/DNI     Disposition  Patient is not medically cleared for discharge.   Anticipate discharge to to home with close outpatient follow-up.  I have placed the appropriate orders for post-discharge needs.    Review of Systems:    Review of Systems   Constitutional:  Negative for chills, fever and malaise/fatigue.   Respiratory:  Negative for shortness of breath.    Cardiovascular:  Negative for chest pain and palpitations.   Gastrointestinal:  Positive for abdominal pain. Negative for constipation, diarrhea, nausea and vomiting.   Genitourinary:  Negative for dysuria.   Musculoskeletal:  Positive for back pain and joint pain.   Neurological:  Negative for dizziness and headaches.        Lightheaded when ambulates at times    Psychiatric/Behavioral:  Negative for depression. The patient is nervous/anxious.      Objective Data:   Physical Exam:   Vitals:   Temp:  [36.7 °C (98.1 °F)-37.1 °C (98.8 °F)] 37 °C (98.6 °F)  Pulse:  [] 77  Resp:  [17-18] 18  BP: ()/(50-70) 107/64  SpO2:  [94 %-97 %] 95 %     Physical Exam  Constitutional:       General: He is not in acute distress.     Appearance: Normal appearance. He is not ill-appearing.   HENT:      Head: Normocephalic and atraumatic.      Right Ear: External ear normal.      Left Ear: External ear normal.      Nose: Nose normal.      Mouth/Throat:      Mouth: Mucous membranes are moist.   Eyes:      Extraocular Movements: Extraocular movements intact.      Conjunctiva/sclera: Conjunctivae normal.      Comments: Pupils equal   Cardiovascular:      Rate and Rhythm: Normal rate and regular rhythm.      Pulses: Normal pulses.      Heart sounds: Normal heart  sounds. No murmur heard.  Pulmonary:      Effort: Pulmonary effort is normal. No respiratory distress.      Breath sounds: Normal breath sounds.   Abdominal:      General: Abdomen is flat. Bowel sounds are normal.      Palpations: Abdomen is soft.      Tenderness: There is abdominal tenderness. There is no guarding or rebound.      Comments: Abdominal tenderness in right lower quadrant   Musculoskeletal:      Cervical back: Normal range of motion.      Right lower leg: No edema.      Left lower leg: No edema.   Skin:     General: Skin is warm and dry.   Neurological:      General: No focal deficit present.      Mental Status: He is alert and oriented to person, place, and time.   Psychiatric:         Mood and Affect: Mood normal.         Behavior: Behavior normal.         Thought Content: Thought content normal.         Judgment: Judgment normal.         Labs:   HEMATOLOGY/ ONCOLOGY/ID:            Recent Labs     08/13/22  0525 08/14/22  0042 08/15/22  0222 08/15/22  1535   WBC 8.6 9.3 9.6 11.6*   RBC 3.20* 3.10* 2.99* 3.60*   HEMOGLOBIN 7.1* 7.0* 6.8* 8.6*   HEMATOCRIT 25.2* 24.6* 24.2* 29.2*   MCV 78.8* 79.4* 80.9* 81.1*   MCH 22.2* 22.6* 22.7* 23.9*   RDW 62.4* 63.3* 65.2* 63.5*   PLATELETCT 426 436 384 439   MPV 8.2* 8.2* 8.1* 7.9*   NEUTSPOLYS 74.90* 72.60* 69.30  --    LYMPHOCYTES 9.10* 12.30* 15.80*  --    MONOCYTES 13.00 11.70 11.40  --    EOSINOPHILS 2.00 2.40 2.40  --    BASOPHILS 0.50 0.50 0.60  --      Lab Results   Component Value Date    BEJFHMEY10 1751 (H) 08/10/2022    FERRITIN 125.0 08/10/2022    IRON 19 (L) 08/10/2022    TOTIRONBC 190 (L) 08/10/2022       RENAL:        Estimated GFR/CRCL = Estimated Creatinine Clearance: 175.7 mL/min (A) (by C-G formula based on SCr of 0.45 mg/dL (L)).  Recent Labs     08/13/22  0525 08/14/22  0042 08/15/22  0222   SODIUM 132* 133* 134*   POTASSIUM 3.4* 3.4* 4.1   CHLORIDE 98 98 103   CO2 25 25 23   GLUCOSE 96 111* 102*   BUN 9 9 7*   CREATININE 0.61 0.61 0.45*    CALCIUM 8.5 8.3* 8.2*   MAGNESIUM  --   --  1.6   ALBUMIN 2.7* 2.6* 2.3*       GASTROINTESTINAL/ HEPATIC:          Recent Labs     08/13/22  0525 08/14/22  0042 08/15/22  0222   ALTSGPT 6 8 6   ASTSGOT 12 13 16   ALKPHOSPHAT 217* 207* 345*   TBILIRUBIN 0.4 0.2 0.2   ALBUMIN 2.7* 2.6* 2.3*   GLOBULIN 3.7* 3.6* 3.5     No results found for: AMMONIA    ENDOCRINE:              Recent Labs     08/13/22  0525 08/14/22 0042 08/15/22  0222   GLUCOSE 96 111* 102*     Lab Results   Component Value Date    HBA1C 5.4 08/05/2022    HBA1C 5.5 04/22/2021    HBA1C 5.8 (H) 03/13/2019      Imaging:   [id number] VIEW   Final Result      Normal bowel gas pattern with a small colonic stool burden.      IR-INSERT IVC FILTER WITH IG & SI   Final Result         1. ULTRASOUND AND FLUOROSCOPIC GUIDED PLACEMENT OF A OPTION ELITE CAVAL FILTER DEPLOYED IN THE INFRARENAL IVC.      2. INFERIOR VENA CAVAGRAM WITHIN NORMAL LIMITS WITH NO EVIDENCE OF CAVAL THROMBUS OR OCCLUSION.      US-EXTREMITY VENOUS LOWER BILAT   Final Result      DX-KNEE COMPLETE 4+ RIGHT   Final Result      1.  No evidence of acute fracture or dislocation.   2.  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   3.  Moderate joint effusion.   4.  Chondrocalcinosis.         EC-ECHOCARDIOGRAM COMPLETE W/O CONT   Final Result      CT-ABDOMEN-PELVIS WITH   Final Result      1.  Large cecal mass consistent with colon cancer.      2.  Metastatic lymphadenopathy in the right lower quadrant mesentery and possibly in the retroperitoneum.      3.  Small focus of extraluminal gas identified posterior to the large mass in the right lower quadrant.      4.  Intrahepatic and extra hepatic biliary duct dilatation possibly related to previous cholecystectomy and patient age. Recommend correlation with liver function tests.      5.  Nonobstructive renal stones.      6.  This was discussed with Dr. Bennett at 4:20 PM.      CT-CTA CHEST PULMONARY ARTERY W/ RECONS   Final Result         1.  No CT evidence of pulmonary embolism.   2. Patchy right lower lung opacities, likely atelectasis.   3. No pleural effusion or pneumothorax.         DX-CHEST-PORTABLE (1 VIEW)   Final Result         1. No acute cardiopulmonary abnormalities are identified.      CT-CHEST,ABDOMEN,PELVIS W/O   Final Result      Limited exam due to lack of oral or IV contrast.      1. Large mass involving the cecum and ascending colon, in keeping with primary colon cancer.         2.  Multiple enlarged right lower quadrant mesenteric lymph nodes, likely metastasis. Several mildly prominent retroperitoneal lymph nodes, indeterminate.      3. Nonobstructive left renal calculus.      4. Dilated CBD and intrahepatic bile duct could relate to post cholecystectomy status. Correlate with LFTs.      DX-CHEST-PORTABLE (1 VIEW)   Final Result         1. No acute cardiopulmonary abnormalities are identified.      MR-PELVIS-WITH & W/O AND SEQUENCES    (Results Pending)      Problem Representation:      * Malignant neoplasm of ascending colon (HCC)- (present on admission)  Assessment & Plan  CT showed large mass that is suspected primary colonic cancer. GI and colorectal surgery consulted. 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 General surgery Dr. Bennett following (pending MRI with rectal protocol for staging and surgical intervention. This will be performed on Joann 3 magnet MRI, outpatient location). Oncology following, appreciate recs. Palliative consulted.     -Palliative and primary team meeting with patient and wife today 8/15 and patient has decided on home hospice   -Hgb at 6.8 overnight and transfused with 1 U pRBC   -Monitor H/H and transfuse as necessary    Lactic acid acidosis  Assessment & Plan  Lactic acid initially 2.5 then 1.4 s/p LR    -Resolved  -We will monitor as needed    Acute deep vein thrombosis (DVT) of right peroneal vein  ([facility])  Assessment & Plan  Pt with bilateral knee swelling, R>L. Doppler US showed acute, occlusive DVT in one of the paired proximal and mid peroneal veins.     -Due to borderline hemoglobin and increased risk of bleeding, will defer AC   -IVC filter placed on 8/12    Constipation  Assessment & Plan  Related to large right-sided mass    -Continue bowel protocol  -KUB demonstrated small colonic stool and normal gas pattern    Pain, chronic- (present on admission)  Assessment & Plan  Scheduled and as needed breakthrough pain medications. R knee xray showed no evidence of acute fracture but moderate joint effusion and mild to moderate tricompartmental osteoarthritis    -ROM and swelling improved  -Continue ice packs  -Will uptitrate Allopurinol to home dose of 300mg bid, currently on 200mg bid    SVT (supraventricular tachycardia) ([facility])- (present on admission)  Assessment & Plan  Resolved with IV fluid and 1 dose of IV diltiazem in the ED. During telemetry monitoring, patient had no further episodes of SVT    -Continue Lopressor 25mg bid  -Continue to monitor for signs and symptoms of SVT    Elevated troponin- (present on admission)  Assessment & Plan  Likely secondary to tachyarrhythmia/SVT, no ongoing chest pain, no ST segment elevations or depressions. Troponin trend: 29, 18, 27, 24    -Repeat EKG if he develops any chest pain    MICHAEL (acute kidney injury) ([facility])- (present on admission)  Assessment & Plan  Had acute kidney injury during admission.      -Resolved      Hypokalemia- (present on admission)  Assessment & Plan  Patient has had bouts of hypokalemia during admission    -Current potassium at 4.1      Anemia- (present on admission)  Assessment & Plan  [place]ik[place] 2/2 chronic GI losses with large colonic mass suspected to be colon cancer.  Received 2 U pRBCs (8/15, 8/5)    -Transfuse hemoglobin less than 7  -We will continue to trend H&H      Sepsis ([facility])- (present on admission)  Assessment & Plan  This is Sepsis  Present on admission -resolved  SIRS criteria: Tachycardia, with heart rate greater than 90 BPM, Tachypnea, with respirations greater than 20 per minute and Leukocytosis, with WBC greater than 12,000  Source is Suspected abdominal, possibly just gastroenteritis, symptoms of nausea, vomiting, abdominal pain.   Finished courses of cefuroxime (8/9 - 8/13) and flagyl (8/8 - 8/13)

## 2022-08-16 NOTE — CARE PLAN
The patient is Stable - Low risk of patient condition declining or worsening    Shift Goals  Clinical Goals: Ambulate  Patient Goals: Call wife for update  Family Goals: none present    Progress made toward(s) clinical / shift goals:    Problem: Pain - Standard  Goal: Alleviation of pain or a reduction in pain to the patient’s comfort goal  Outcome: Progressing     Problem: Knowledge Deficit - Standard  Goal: Patient and family/care givers will demonstrate understanding of plan of care, disease process/condition, diagnostic tests and medications  Outcome: Progressing       Patient is not progressing towards the following goals:

## 2022-08-16 NOTE — NON-PROVIDER
Daily Progress Note:     Date of Service: 8/16/2022  Primary Team: [facility]   Attending: Dr Felix Perla  Senior Resident: Dr. Clover Ingram    Intern: Dr. Sol Barton  Contact:  [phone]     ID:   Everett is a 72 y.o. male with a PMHx of HTN, HLP, MDD, and DB who presented in the ER on 8/5/2022 with abdominal pain that has been ongoing since March 2022. Patient underwent colonoscopy with biopsy that revealed adenocarcinoma of the colon along with cancer of the rectum.      Chief Complaint:   Abdominal pain and nausea     Interval Events:  Upon interview this morning, patient states that he still experiences abdominal pain with sudden movements. His pain has been well controlled with morphine. However he adds that it does make him drowsy. Consequently, he would like to change the dosage and timing of his pain medication if possible. He currently denies any fever, chills, chest pain, palpitations, cough, nausea, vomiting, or hematuria.        Consultants/Specialty:  General Surgery, Colorectal Surgery, Palliative Care, Oncology, Gastroenterology    Review of Systems:    Review of Systems   Constitutional:  Negative for chills and fever.   HENT:  Negative for congestion, sinus pain and sore throat.    Eyes:  Negative for discharge and redness.   Respiratory:  Negative for cough, shortness of breath and wheezing.    Cardiovascular:  Negative for chest pain, palpitations and leg swelling.   Gastrointestinal:  Positive for abdominal pain. Negative for constipation, diarrhea, nausea and vomiting.   Genitourinary:  Negative for dysuria and hematuria.   Musculoskeletal:  Positive for back pain and joint pain.   Skin:  Negative for rash.   Neurological:  Negative for dizziness and headaches.   Endo/Heme/Allergies:  Does not bruise/bleed easily.   Psychiatric/Behavioral: Negative.       Objective Data:   Physical Exam:   Vitals:   Temp:  [36.5 °C (97.7 °F)-37 °C (98.6 °F)] 37 °C (98.6 °F)  Pulse:   [71-90] 90  Resp:  [18] 18  BP: (107-128)/(64-70) 113/68  SpO2:  [95 %-98 %] 95 %  Physical Exam  Constitutional:       Appearance: Normal appearance. He is not ill-appearing, toxic-appearing or diaphoretic.   HENT:      Head: Normocephalic and atraumatic.      Right Ear: External ear normal.      Left Ear: External ear normal.      Nose: Nose normal. No congestion or rhinorrhea.      Mouth/Throat:      Mouth: Mucous membranes are moist.      Pharynx: Oropharynx is clear.   Eyes:      General: No scleral icterus.        Right eye: No discharge.         Left eye: No discharge.      Extraocular Movements: Extraocular movements intact.      Conjunctiva/sclera: Conjunctivae normal.      Pupils: Pupils are equal, round, and reactive to light.   Cardiovascular:      Rate and Rhythm: Normal rate and regular rhythm.      Pulses: Normal pulses.      Heart sounds: Normal heart sounds. No murmur heard.    No friction rub. No gallop.   Pulmonary:      Effort: Pulmonary effort is normal. No respiratory distress.      Breath sounds: Normal breath sounds. No stridor. No wheezing, rhonchi or rales.   Chest:      Chest wall: No tenderness.   Abdominal:      General: Bowel sounds are normal.      Comments: There is a solid mass palpated in the right side of the abdomen with moderate tenderness to palpation.    Musculoskeletal:         General: Normal range of motion.      Cervical back: Normal range of motion and neck supple.   Skin:     General: Skin is warm and dry.      Coloration: Skin is not jaundiced or pale.      Findings: No bruising.   Neurological:      General: No focal deficit present.      Mental Status: He is alert and oriented to person, place, and time. Mental status is at baseline.      Cranial Nerves: No cranial nerve deficit.      Sensory: No sensory deficit.   Psychiatric:         Mood and Affect: Mood normal.         Behavior: Behavior normal.         Thought Content: Thought content normal.         Judgment:  Judgment normal.         Labs:   Recent Labs     08/14/22  0042 08/15/22  0222 08/15/22  1535   WBC 9.3 9.6 11.6*   RBC 3.10* 2.99* 3.60*   HEMOGLOBIN 7.0* 6.8* 8.6*   HEMATOCRIT 24.6* 24.2* 29.2*   MCV 79.4* 80.9* 81.1*   MCH 22.6* 22.7* 23.9*   RDW 63.3* 65.2* 63.5*   PLATELETCT 436 384 439   MPV 8.2* 8.1* 7.9*   NEUTSPOLYS 72.60* 69.30  --    LYMPHOCYTES 12.30* 15.80*  --    MONOCYTES 11.70 11.40  --    EOSINOPHILS 2.40 2.40  --    BASOPHILS 0.50 0.60  --      Recent Labs     08/14/22 0042 08/15/22  0222   SODIUM 133* 134*   POTASSIUM 3.4* 4.1   CHLORIDE 98 103   CO2 25 23   GLUCOSE 111* 102*   BUN 9 7*         Imaging:   [id number] [id number]   Final Result      Normal bowel gas pattern with a small colonic stool burden.      IR-INSERT IVC FILTER WITH IG & SI   Final Result         1. ULTRASOUND AND FLUOROSCOPIC GUIDED PLACEMENT OF A OPTION ELITE CAVAL FILTER DEPLOYED IN THE INFRARENAL IVC.      2. INFERIOR VENA CAVAGRAM WITHIN NORMAL LIMITS WITH NO EVIDENCE OF CAVAL THROMBUS OR OCCLUSION.      US-EXTREMITY VENOUS LOWER BILAT   Final Result      DX-KNEE COMPLETE 4+ RIGHT   Final Result      1.  No evidence of acute fracture or dislocation.   2.  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   3.  Moderate joint effusion.   4.  Chondrocalcinosis.         EC-ECHOCARDIOGRAM COMPLETE W/O CONT   Final Result      CT-ABDOMEN-PELVIS WITH   Final Result      1.  Large cecal mass consistent with colon cancer.      2.  Metastatic lymphadenopathy in the right lower quadrant mesentery and possibly in the retroperitoneum.      3.  Small focus of extraluminal gas identified posterior to the large mass in the right lower quadrant.      4.  Intrahepatic and extra hepatic biliary duct dilatation possibly related to previous cholecystectomy and patient age. Recommend correlation with liver function tests.      5.  Nonobstructive renal stones.      6.  This was discussed with Dr. Bennett at 4:20 PM.      CT-CTA CHEST PULMONARY  ARTERY W/ RECONS   Final Result         1. No CT evidence of pulmonary embolism.   2. Patchy right lower lung opacities, likely atelectasis.   3. No pleural effusion or pneumothorax.         DX-CHEST-PORTABLE (1 VIEW)   Final Result         1. No acute cardiopulmonary abnormalities are identified.      CT-CHEST,ABDOMEN,PELVIS W/O   Final Result      Limited exam due to lack of oral or IV contrast.      1. Large mass involving the cecum and ascending colon, in keeping with primary colon cancer.         2.  Multiple enlarged right lower quadrant mesenteric lymph nodes, likely metastasis. Several mildly prominent retroperitoneal lymph nodes, indeterminate.      3. Nonobstructive left renal calculus.      4. Dilated CBD and intrahepatic bile duct could relate to post cholecystectomy status. Correlate with LFTs.      DX-CHEST-PORTABLE (1 VIEW)   Final Result         1. No acute cardiopulmonary abnormalities are identified.      MR-PELVIS-WITH & W/O AND SEQUENCES    (Results Pending)       Problem Representation:   Everett Peters is a 72 YOM with a PMHx of HTN, HLP, MDD, and DB and was admitted on 8/5/22 for abdominal pain, nausea, and vomiting. Patient underwent a colonoscopy with biopsy on 8/7/22 which subsequently revealed grade III adenocarcinoma of the colon and rectal cancer as well. On 8/11/22 patient was found to have a DVT in the RLE and opted for IVC filter placement. Patient currently experiences abdominal pain with movement and back pain along with joint pain.         Assessment/Plan:      Colon Cancer and Rectal Cancer  Patient underwent colonoscopy and biopsy on 8/5/22 which revealed grade III adenocarcinoma of the colon along with rectal cancer. MRI will be necessary for further staging of rectal cancer. Patient and his wife spoke with Danay Acevedo (palliative care) about his thoughts of treatment options. Per palliative care note, patient has opted to forgo surgical intervention and would like to focus  his efforts on comfort care. Plan at this time includes changing patient's current pain medication regimen. Orders have been put in for 15 mg of morphine every 8 hours with PRN medications for breakthrough pain. Per  note, a referral has been made to consult a physician for hospice order.      2. Anemia  Etiology is likely secondary to patient's colon cancer. Patient had a Hb level of 6.8 on 8/15/22 for which he was given a unit of pRBC. Subsequent CBC revealed Hb of 8.6. Plan includes continuing to monitor CBC with transfusion protocol in place.     3. Supraventricular Tachycardia  While in the ED on 8/5/22, patient experienced an episode of SVT. He was given 25 mg IV diltiazem with cardioversion into sinus rhythm. Patient was placed on telemetry and has been receiving metoprolol 25 mg BID. Patient has not experienced any SVT since. Plan includes changing metoprolol tartrate to metoprolol succinate 50 mg daily.     4. Constipation  Etiology is likely due to tumor burden in the colon and rectum which is causing obstruction. Abdominal X Ray on 8/12/22 revealed normal bowel gas pattern with small colonic stool burden. Bowel protocol is in place. Patient will be given milk of magnesia PRN as he mentions that it has worked well for him throughout admission and in the past.      5. Hypokalemia  Patient's most recent CMP revealed K of 4.1 (was 3.4 yesterday). Plan includes continuing serial CMP checks with electrolyte replenishment if needed.      6. DVT  Ultrasound of the RLE on 8/11/22 revealed DVT. Patient received IVC filter placement and is currently not on any anticoagulation medication due to possibility of increased bleeding. Patient's condition is currently stable.      7. Lactic acidosis  On 8/11/22 patient had a lactic acid level of 2.5. Repeat measure on 8/12/22 revealed lactic acid level of 1.4 after patient was given LR solution. This issue has since resolved.      VTE prophylaxis:  SCD/ANITHA  Dispo: Patient will be kept in the hospital overnight.

## 2022-08-17 ENCOUNTER — PHARMACY VISIT (OUTPATIENT)
Dept: PHARMACY | Facility: MEDICAL CENTER | Age: 72
End: 2022-08-17
Payer: COMMERCIAL

## 2022-08-17 VITALS
RESPIRATION RATE: 18 BRPM | SYSTOLIC BLOOD PRESSURE: 98 MMHG | HEART RATE: 90 BPM | HEIGHT: 71 IN | TEMPERATURE: 98.3 F | WEIGHT: 205.91 LBS | DIASTOLIC BLOOD PRESSURE: 60 MMHG | OXYGEN SATURATION: 98 % | BODY MASS INDEX: 28.83 KG/M2

## 2022-08-17 PROBLEM — E87.6 HYPOKALEMIA: Status: RESOLVED | Noted: 2022-08-06 | Resolved: 2022-08-17

## 2022-08-17 PROBLEM — I47.10 SVT (SUPRAVENTRICULAR TACHYCARDIA) (HCC): Status: RESOLVED | Noted: 2022-08-06 | Resolved: 2022-08-17

## 2022-08-17 PROBLEM — N17.9 AKI (ACUTE KIDNEY INJURY) (HCC): Status: RESOLVED | Noted: 2022-08-06 | Resolved: 2022-08-17

## 2022-08-17 PROBLEM — R79.89 ELEVATED TROPONIN: Status: RESOLVED | Noted: 2022-08-06 | Resolved: 2022-08-17

## 2022-08-17 PROBLEM — E87.20 LACTIC ACID ACIDOSIS: Status: RESOLVED | Noted: 2022-08-11 | Resolved: 2022-08-17

## 2022-08-17 PROBLEM — A41.9 SEPSIS (HCC): Status: RESOLVED | Noted: 2022-08-05 | Resolved: 2022-08-17

## 2022-08-17 PROCEDURE — 97530 THERAPEUTIC ACTIVITIES: CPT

## 2022-08-17 PROCEDURE — 97535 SELF CARE MNGMENT TRAINING: CPT

## 2022-08-17 PROCEDURE — 99497 ADVNCD CARE PLAN 30 MIN: CPT | Performed by: NURSE PRACTITIONER

## 2022-08-17 PROCEDURE — 700102 HCHG RX REV CODE 250 W/ 637 OVERRIDE(OP): Performed by: HOSPITALIST

## 2022-08-17 PROCEDURE — A9270 NON-COVERED ITEM OR SERVICE: HCPCS | Performed by: NURSE PRACTITIONER

## 2022-08-17 PROCEDURE — 700111 HCHG RX REV CODE 636 W/ 250 OVERRIDE (IP): Performed by: STUDENT IN AN ORGANIZED HEALTH CARE EDUCATION/TRAINING PROGRAM

## 2022-08-17 PROCEDURE — 700102 HCHG RX REV CODE 250 W/ 637 OVERRIDE(OP): Performed by: NURSE PRACTITIONER

## 2022-08-17 PROCEDURE — RXMED WILLOW AMBULATORY MEDICATION CHARGE: Performed by: STUDENT IN AN ORGANIZED HEALTH CARE EDUCATION/TRAINING PROGRAM

## 2022-08-17 PROCEDURE — 99238 HOSP IP/OBS DSCHRG MGMT 30/<: CPT | Mod: GC | Performed by: HOSPITALIST

## 2022-08-17 PROCEDURE — A9270 NON-COVERED ITEM OR SERVICE: HCPCS | Performed by: HOSPITALIST

## 2022-08-17 PROCEDURE — 700102 HCHG RX REV CODE 250 W/ 637 OVERRIDE(OP): Performed by: STUDENT IN AN ORGANIZED HEALTH CARE EDUCATION/TRAINING PROGRAM

## 2022-08-17 PROCEDURE — A9270 NON-COVERED ITEM OR SERVICE: HCPCS | Performed by: STUDENT IN AN ORGANIZED HEALTH CARE EDUCATION/TRAINING PROGRAM

## 2022-08-17 RX ORDER — POLYETHYLENE GLYCOL 3350 17 G/17G
17 POWDER, FOR SOLUTION ORAL DAILY
Qty: 10 EACH | Refills: 1 | Status: SHIPPED | OUTPATIENT
Start: 2022-08-17

## 2022-08-17 RX ORDER — ACETAMINOPHEN 325 MG/1
650 TABLET ORAL EVERY 6 HOURS PRN
Qty: 30 TABLET | Refills: 0 | Status: SHIPPED | OUTPATIENT
Start: 2022-08-17

## 2022-08-17 RX ORDER — AMOXICILLIN 250 MG
2 CAPSULE ORAL 2 TIMES DAILY
Qty: 30 TABLET | Refills: 0 | Status: CANCELLED | OUTPATIENT
Start: 2022-08-17

## 2022-08-17 RX ORDER — MAGNESIUM SULFATE 1 G/100ML
1 INJECTION INTRAVENOUS ONCE
Status: COMPLETED | OUTPATIENT
Start: 2022-08-17 | End: 2022-08-17

## 2022-08-17 RX ORDER — OXYCODONE HYDROCHLORIDE 15 MG/1
15 TABLET ORAL
Qty: 30 TABLET | Refills: 0 | Status: CANCELLED | OUTPATIENT
Start: 2022-08-17 | End: 2022-08-22

## 2022-08-17 RX ORDER — METOPROLOL SUCCINATE 50 MG/1
50 TABLET, EXTENDED RELEASE ORAL DAILY
Qty: 30 TABLET | Refills: 0 | Status: SHIPPED | OUTPATIENT
Start: 2022-08-18

## 2022-08-17 RX ORDER — MORPHINE SULFATE 15 MG/1
15 TABLET, FILM COATED, EXTENDED RELEASE ORAL EVERY 8 HOURS
Qty: 60 TABLET | Refills: 0 | Status: CANCELLED | OUTPATIENT
Start: 2022-08-17

## 2022-08-17 RX ORDER — ONDANSETRON 4 MG/1
4 TABLET, ORALLY DISINTEGRATING ORAL EVERY 4 HOURS PRN
Qty: 10 TABLET | Refills: 0 | Status: SHIPPED | OUTPATIENT
Start: 2022-08-17

## 2022-08-17 RX ORDER — GABAPENTIN 100 MG/1
200 CAPSULE ORAL NIGHTLY
Qty: 30 CAPSULE | Refills: 0 | Status: SHIPPED | OUTPATIENT
Start: 2022-08-17

## 2022-08-17 RX ORDER — BISACODYL 10 MG
10 SUPPOSITORY, RECTAL RECTAL
Refills: 0 | Status: CANCELLED | OUTPATIENT
Start: 2022-08-17

## 2022-08-17 RX ORDER — OXYCODONE HYDROCHLORIDE 10 MG/1
10 TABLET ORAL
Qty: 28 TABLET | Refills: 0 | Status: CANCELLED | OUTPATIENT
Start: 2022-08-17 | End: 2022-08-22

## 2022-08-17 RX ADMIN — OXYCODONE HYDROCHLORIDE 15 MG: 10 TABLET ORAL at 06:01

## 2022-08-17 RX ADMIN — METOPROLOL SUCCINATE 50 MG: 50 TABLET, EXTENDED RELEASE ORAL at 05:29

## 2022-08-17 RX ADMIN — OXYCODONE HYDROCHLORIDE 15 MG: 10 TABLET ORAL at 11:57

## 2022-08-17 RX ADMIN — MAGNESIUM SULFATE IN DEXTROSE 1 G: 10 INJECTION, SOLUTION INTRAVENOUS at 13:42

## 2022-08-17 RX ADMIN — ALLOPURINOL 200 MG: 100 TABLET ORAL at 05:29

## 2022-08-17 RX ADMIN — MORPHINE SULFATE 15 MG: 15 TABLET, EXTENDED RELEASE ORAL at 05:29

## 2022-08-17 RX ADMIN — MORPHINE SULFATE 15 MG: 15 TABLET, EXTENDED RELEASE ORAL at 13:42

## 2022-08-17 ASSESSMENT — GAIT ASSESSMENTS
DEVIATION: INCREASED BASE OF SUPPORT;BRADYKINETIC;SHUFFLED GAIT;DECREASED HEEL STRIKE;DECREASED TOE OFF
ASSISTIVE DEVICE: FRONT WHEEL WALKER
GAIT LEVEL OF ASSIST: CONTACT GUARD ASSIST
DISTANCE (FEET): 20

## 2022-08-17 ASSESSMENT — COGNITIVE AND FUNCTIONAL STATUS - GENERAL
MOBILITY SCORE: 12
WALKING IN HOSPITAL ROOM: A LITTLE
STANDING UP FROM CHAIR USING ARMS: A LITTLE
SUGGESTED CMS G CODE MODIFIER MOBILITY: CL
CLIMB 3 TO 5 STEPS WITH RAILING: A LOT
TURNING FROM BACK TO SIDE WHILE IN FLAT BAD: A LOT
MOVING FROM LYING ON BACK TO SITTING ON SIDE OF FLAT BED: UNABLE
MOVING TO AND FROM BED TO CHAIR: UNABLE

## 2022-08-17 ASSESSMENT — PAIN DESCRIPTION - PAIN TYPE
TYPE: ACUTE PAIN
TYPE: ACUTE PAIN
TYPE: ACUTE PAIN;CHRONIC PAIN

## 2022-08-17 NOTE — CARE PLAN
The patient is Stable - Low risk of patient condition declining or worsening    Shift Goals  Clinical Goals: (P) Discharge, Safety  Patient Goals: (P) Rest  Family Goals: none present    Progress made toward(s) clinical / shift goals:    Problem: Knowledge Deficit - Standard  Goal: Patient and family/care givers will demonstrate understanding of plan of care, disease process/condition, diagnostic tests and medications  Outcome: Met     Problem: Skin Integrity  Goal: Skin integrity is maintained or improved  Outcome: Met     Problem: Fall Risk  Goal: Patient will remain free from falls  Outcome: Met       Patient is set to discharge with hospice today.

## 2022-08-17 NOTE — CARE PLAN
The patient is Stable - Low risk of patient condition declining or worsening    Shift Goals  Clinical Goals: Safety  Patient Goals: Rest    Progress made toward(s) clinical / shift goals:     Problem: Pain - Standard  Goal: Alleviation of pain or a reduction in pain to the patient’s comfort goal  Outcome: Progressing  Flowsheets (Taken 8/16/2022 2009)  Pain Rating Scale (NPRS): 8  Note: Medicated per MAR, provided extra pillows and blankets for support and repositioning. Will continue to assess and treat accordingly.      Problem: Knowledge Deficit - Standard  Goal: Patient and family/care givers will demonstrate understanding of plan of care, disease process/condition, diagnostic tests and medications  Outcome: Progressing  Note: Educated patient on POC, pain management, medication administration, ambulation, safety, diet, rest, usage of call light, interventions in place to maintain/ improve skin integrity, distraction, monitoring input/ output, discharge planning. Will continue to educate on POC accordingly.

## 2022-08-17 NOTE — THERAPY
Physical Therapy   Daily Treatment     Patient Name: Everett Peters  Age:  72 y.o., Sex:  male  Medical Record #: [id number]  Today's Date: 8/17/2022     Precautions: Fall Risk    Assessment    Pt seen for PT tx primarily for education given pt is discharging on hospice this afternoon. Pt educated on maintaining fxnl mob to reduce requiring increased assistance. Also educated on reaching out to care chest to see if he can obtain trapeze and WC to improve bed mob and community negotiation. Also dischHe conts to present with generalized weakness. Today required min A with FWW to negotiate mob within the room. Cues provided to increase ind with STS. PT will cont while pt remains in acute setting.    Plan    Continue current treatment plan.    DC Equipment Recommendations: Front-Wheel Walker  Discharge Recommendations: Other - (pt DCing with hospice this afternoon)    Objective    Cognition    Cognition / Consciousness WDL   Level of Consciousness Alert   Comments Very pleasant and motivated to maintain fxnl strength   Active ROM Lower Body    Active ROM Lower Body  WDL   Strength Lower Body   Lower Body Strength  X   Gross Strength Generalized Weakness, Equal Bilaterally   Comments BLE strength 3/5   Balance   Sitting Balance (Static) Fair   Sitting Balance (Dynamic) Fair   Standing Balance (Static) Fair -   Standing Balance (Dynamic) Poor +   Weight Shift Sitting Fair   Weight Shift Standing Fair   Skilled Intervention Verbal Cuing;Sequencing   Comments w/ FWW, no overt LOB during gait   Gait Analysis   Gait Level Of Assist Contact Guard Assist   Assistive Device Front Wheel Walker   Distance (Feet) 20   # of Times Distance was Traveled 1   Deviation Increased Base Of Support;Bradykinetic;Shuffled Gait;Decreased Heel Strike;Decreased Toe Off  (poor B knee extension with wt loading)   # of Stairs Climbed 0   Weight Bearing Status no restrictions   Skilled Intervention Verbal Cuing;Sequencing;Postural Facilitation    Comments distance limiting by fatigue/weakness   Bed Mobility    Supine to Sit Minimal Assist   Sit to Supine   (Up on toilet post, nsg aware)   Skilled Intervention Verbal Cuing;Sequencing;Postural Facilitation   Functional Mobility   Sit to Stand Minimal Assist   Bed, Chair, Wheelchair Transfer Contact Guard Assist   Transfer Method Stand Step   Skilled Intervention Verbal Cuing;Sequencing;Postural Facilitation   Short Term Goals    Short Term Goal # 1 Pt will demo supine<>sit eob w/ HOB flat and no rails spv in 6 visits for independence w/ bed mobility   Goal Outcome # 1 Progressing slower than expected   Short Term Goal # 2 Pt will demo stand step txr eob<>chair w/ FWW spv in 6 visits for independence w/ OOB mobility.   Goal Outcome # 2 Progressing slower than expected   Short Term Goal # 3 Pt will demo gait >150' using FWW spv in a moving environment in 6 visits for household ambulation.   Goal Outcome # 3 Progressing slower than expected   Short Term Goal # 4 Pt will demo ability to navigate 6 stairs w/ UE support spv in 6 visits for access to his home environment.   Goal Outcome # 4 Progressing slower than expected

## 2022-08-17 NOTE — DISCHARGE PLANNING
Case Management Discharge Planning    Admission Date: 8/5/2022  GMLOS: 3.5  ALOS: 12    6-Clicks ADL Score: 20  6-Clicks Mobility Score: 17  PT and/or OT Eval ordered: Yes  Post-acute Referrals Ordered: Yes  Post-acute Choice Obtained: Yes  Has referral(s) been sent to post-acute provider:  Yes      Anticipated Discharge Dispo: Discharge Disposition: D/T to another type of health care inst. (70)    DME Needed: Yes    DME Ordered: Yes    Action(s) Taken: Updated Provider/Nurse on Discharge Plan    Escalations Completed: None    Medically Clear: Yes    Next Steps: Discharge planning completed with patient and spouse.     Patient will be discharged home with [facility]  Transport has been arranged for 2:30 via [facility]  DME has been ordered and will be delivered to the home    Barriers to Discharge: None    Is the patient up for discharge tomorrow: Yes    Is transport arranged for discharge disposition: Yes

## 2022-08-17 NOTE — PROGRESS NOTES
"Palliative Care Advance Care Planning Progress Note    General: Everett Peters 72-year-old male with past medical history of hypertension, hyperlipidemia, depression, anxiety, gout, and chronic back pain following an injury admitted 8/5/2022 and found to have synchronous separate malignancies including right cecum: Cancer likely stage III and rectal cancer, unknown stage.  Patient was originally seen by palliative care/12/2022 for advance care planning and 8/13/2022 for pain management.    Discussion: Patient reports changes made in opioid regiment yesterday have produced a balanced, even, and tolerable pain level.  He does not feel that further adjustments need to be made.  I discussed discharge medications with primary team.    Recommended POLST form completion.  Patient's wife is not present in his home getting the house ready for hospice care.  Patient is scheduled to transport home with [facility] at 14:30 for home hospice care with [facility].   Patient will defer signing document until I can review with his wife.  Form completed for DNR, comfort treatment, no artificial nutrition, and no IV fluids.  I have signed form and it is pending patient's signature.  In the meantime I wrote DNR order for [facility] transport and provided to RN.  Patient denies having questions or needs.  I confirmed I would reach out to his wife to discuss form and see if she has any questions.  Well wishes provided and encouraged patient to call with any questions at any time.    Call placed to patient's wife Kaylee.  She reports medical equipment personnel as assembling patient's bed.  I provided an update as above.  She continues to feel slightly overwhelmed but is taking things \"moment by moment.\"  Affirmed her care and love for her .  Encouraged her to call with any questions or needs at any time.  Reassured her she is in good hands with [facility].    Provided therapeutic communication including open-ended questions, " therapeutic presence/silence, reflective listening, and validation of thoughts/feelings throughout encounter. Encouraged patient/wife to call with any questions or needs.     Outcome: POLST form completed; signature deferred until he can review with his wife.  DNR order provided to RN for transport home today.    Plan: DC home with [facility] for [facility] hosp[facility] with [facility]     Please call our team with questions and/or additional needs.    Total visit time was 20 minutes discussing advance care planning.    Danay Acevedo, AL.P.R.N.  Palliative Care Nurse Practitioner  [phone]

## 2022-08-17 NOTE — PROGRESS NOTES
Daily Progress Note:     Date of Service: 8/16/2022  Primary Team: [facility] Purple Team   Attending: Felix Perla M.D.   Senior Resident: Dr. Ingram  Intern: Dr. Barton  Contact:  [phone]  Chief Complaint  Nausea, vomiting, abdominal pain     Hospital Course  71 y/o man with hx of HTN, HLD, depression and generalized anxiety disorder previously on Remeron and buspirone, gout, and chronic back pain admitted 8/5/2022 with nausea, vomiting, and abdominal pain. Found to be in SVT in ED with HR up to 184. Resolved after IV fluids and diltiazem. Labs significant for wbc 23516, hgb 6.9, platelets 965, bicarb 14, BUN 28, creatinine 1.47, BNP 3728, and troponin 29. CT abdomen showed large mass in cecum and ascending colon with enlarged RLQ mesenteric lymph nodes. Colonoscopy done which showed significant mass burden involving ileocecal valve in addition to mass proximal to anal verge. Surgery recommending MRI on 3 Ojann magnet with rectal cancer protocol and outpatient f/u. Biopsy showed invasive poorly differentiated adenocarcinoma and tubular adenoma with high grade dysplasia. Oncology has seen with recs given. Pending palliative eval. PT suggests post acute care placement. Now with DVT in R peroneal. IVC filter placed 8/12. Received 1U pRBC due to H 6.8 8/15. After meeting with palliative care on 8/15, patient has decided upon home hospice.      Interval Problem Update  Seen on 8/16, patient states he has pain 4/10 after taking morphine for pain but has breakthrough pain of 6-7/10 before taking the next dose. Discussed with patient today regarding optimizing pain regimen with morphine ER 15mg q 8 hours. He denies feeling depressed or anxious today and is in agreement with continuing plans for home hospice.  H/H at 8.6/29.2 no transfusion needed.    Consultants/Specialty  general surgery, GI, oncology, and palliative care     Code Status  DNAR/DNI     Disposition  Patient is not medically cleared for  discharge.   Anticipate discharge to to home with close outpatient follow-up.  I have placed the appropriate orders for post-discharge needs.    Review of Systems:    Review of Systems   Constitutional:  Negative for chills, fever and malaise/fatigue.   Respiratory:  Negative for shortness of breath.    Cardiovascular:  Negative for chest pain and palpitations.   Gastrointestinal:  Positive for abdominal pain. Negative for constipation, diarrhea, nausea and vomiting.   Genitourinary:  Negative for dysuria.   Musculoskeletal:  Negative for back pain and joint pain.   Neurological:  Negative for dizziness and headaches.        Lightheaded when ambulates at times    Psychiatric/Behavioral:  Negative for depression.      Objective Data:   Physical Exam:   Vitals:   Temp:  [36.5 °C (97.7 °F)-37.1 °C (98.7 °F)] 37.1 °C (98.7 °F)  Pulse:  [71-90] 76  Resp:  [18] 18  BP: (107-128)/(64-69) 123/69  SpO2:  [95 %-98 %] 95 %     Physical Exam  Constitutional:       General: He is not in acute distress.     Appearance: Normal appearance. He is not ill-appearing.   HENT:      Head: Normocephalic and atraumatic.      Right Ear: External ear normal.      Left Ear: External ear normal.      Nose: Nose normal.      Mouth/Throat:      Mouth: Mucous membranes are moist.   Eyes:      Extraocular Movements: Extraocular movements intact.      Conjunctiva/sclera: Conjunctivae normal.      Comments: Pupils equal   Cardiovascular:      Rate and Rhythm: Normal rate and regular rhythm.      Pulses: Normal pulses.      Heart sounds: Normal heart sounds. No murmur heard.  Pulmonary:      Effort: Pulmonary effort is normal. No respiratory distress.      Breath sounds: Normal breath sounds.   Abdominal:      General: Abdomen is flat. Bowel sounds are normal.      Palpations: Abdomen is soft.      Tenderness: There is abdominal tenderness. There is no guarding or rebound.      Comments: Abdominal tenderness in right lower quadrant   Musculoskeletal:       Cervical back: Normal range of motion.      Right lower leg: No edema.      Left lower leg: No edema.   Skin:     General: Skin is warm and dry.   Neurological:      General: No focal deficit present.      Mental Status: He is alert and oriented to person, place, and time.   Psychiatric:         Mood and Affect: Mood normal.         Behavior: Behavior normal.         Thought Content: Thought content normal.         Judgment: Judgment normal.         Labs:   HEMATOLOGY/ ONCOLOGY/ID:            Recent Labs     08/14/22  0042 08/15/22  0222 08/15/22  1535   WBC 9.3 9.6 11.6*   RBC 3.10* 2.99* 3.60*   HEMOGLOBIN 7.0* 6.8* 8.6*   HEMATOCRIT 24.6* 24.2* 29.2*   MCV 79.4* 80.9* 81.1*   MCH 22.6* 22.7* 23.9*   RDW 63.3* 65.2* 63.5*   PLATELETCT 436 384 439   MPV 8.2* 8.1* 7.9*   NEUTSPOLYS 72.60* 69.30  --    LYMPHOCYTES 12.30* 15.80*  --    MONOCYTES 11.70 11.40  --    EOSINOPHILS 2.40 2.40  --    BASOPHILS 0.50 0.60  --      Lab Results   Component Value Date    TYUNHTIB80 1751 (H) 08/10/2022    FERRITIN 125.0 08/10/2022    IRON 19 (L) 08/10/2022    TOTIRONBC 190 (L) 08/10/2022       RENAL:        Estimated GFR/CRCL = Estimated Creatinine Clearance: 175.7 mL/min (A) (by C-G formula based on SCr of 0.45 mg/dL (L)).  Recent Labs     08/14/22  0042 08/15/22  0222   SODIUM 133* 134*   POTASSIUM 3.4* 4.1   CHLORIDE 98 103   CO2 25 23   GLUCOSE 111* 102*   BUN 9 7*   CREATININE 0.61 0.45*   CALCIUM 8.3* 8.2*   MAGNESIUM  --  1.6   ALBUMIN 2.6* 2.3*       GASTROINTESTINAL/ HEPATIC:          Recent Labs     08/14/22  0042 08/15/22  0222   ALTSGPT 8 6   ASTSGOT 13 16   ALKPHOSPHAT 207* 345*   TBILIRUBIN 0.2 0.2   ALBUMIN 2.6* 2.3*   GLOBULIN 3.6* 3.5     No results found for: AMMONIA    ENDOCRINE:              Recent Labs     08/14/22  0042 08/15/22  0222   GLUCOSE 111* 102*     Lab Results   Component Value Date    HBA1C 5.4 08/05/2022    HBA1C 5.5 04/22/2021    HBA1C 5.8 (H) 03/13/2019      Imaging:   [id number] VIEW    Final Result      Normal bowel gas pattern with a small colonic stool burden.      IR-INSERT IVC FILTER WITH IG & SI   Final Result         1. ULTRASOUND AND FLUOROSCOPIC GUIDED PLACEMENT OF A OPTION ELITE CAVAL FILTER DEPLOYED IN THE INFRARENAL IVC.      2. INFERIOR VENA CAVAGRAM WITHIN NORMAL LIMITS WITH NO EVIDENCE OF CAVAL THROMBUS OR OCCLUSION.      US-EXTREMITY VENOUS LOWER BILAT   Final Result      DX-KNEE COMPLETE 4+ RIGHT   Final Result      1.  No evidence of acute fracture or dislocation.   2.  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   3.  Moderate joint effusion.   4.  Chondrocalcinosis.         EC-ECHOCARDIOGRAM COMPLETE W/O CONT   Final Result      CT-ABDOMEN-PELVIS WITH   Final Result      1.  Large cecal mass consistent with colon cancer.      2.  Metastatic lymphadenopathy in the right lower quadrant mesentery and possibly in the retroperitoneum.      3.  Small focus of extraluminal gas identified posterior to the large mass in the right lower quadrant.      4.  Intrahepatic and extra hepatic biliary duct dilatation possibly related to previous cholecystectomy and patient age. Recommend correlation with liver function tests.      5.  Nonobstructive renal stones.      6.  This was discussed with Dr. Bennett at 4:20 PM.      CT-CTA CHEST PULMONARY ARTERY W/ RECONS   Final Result         1. No CT evidence of pulmonary embolism.   2. Patchy right lower lung opacities, likely atelectasis.   3. No pleural effusion or pneumothorax.         DX-CHEST-PORTABLE (1 VIEW)   Final Result         1. No acute cardiopulmonary abnormalities are identified.      CT-CHEST,ABDOMEN,PELVIS W/O   Final Result      Limited exam due to lack of oral or IV contrast.      1. Large mass involving the cecum and ascending colon, in keeping with primary colon cancer.         2.  Multiple enlarged right lower quadrant mesenteric lymph nodes, likely metastasis. Several mildly prominent retroperitoneal lymph nodes,  indeterminate.      3. Nonobstructive left renal calculus.      4. Dilated CBD and intrahepatic bile duct could relate to post cholecystectomy status. Correlate with LFTs.      DX-CHEST-PORTABLE (1 VIEW)   Final Result         1. No acute cardiopulmonary abnormalities are identified.      MR-PELVIS-WITH & W/O AND SEQUENCES    (Results Pending)      Problem Representation:      * Malignant neoplasm of ascending colon (HCC)- (present on admission)  Assessment & Plan  CT showed large mass that is suspected primary colonic cancer. GI and colorectal surgery consulted. 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 General surgery Dr. Bennett following (pending MRI with rectal protocol for staging and surgical intervention. This will be performed on Joann 3 magnet MRI, outpatient location). Oncology following, appreciate recs. Palliative consulted.     -Palliative and primary team meeting with patient and wife today 8/15 and patient has decided on home hospice   -Hgb 8.6 no transfusion required, no serial H/H at this time    Lactic acid acidosis  Assessment & Plan  Lactic acid initially 2.5 then 1.4 s/p LR    -Resolved  -We will monitor as needed    Acute deep vein thrombosis (DVT) of right peroneal vein (HCC)  Assessment & Plan  Pt with bilateral knee swelling, R>L. Doppler US showed acute, occlusive DVT in one of the paired proximal and mid peroneal veins.     -Due to borderline hemoglobin and increased risk of bleeding, will defer AC   -IVC filter placed on 8/12    Constipation  Assessment & Plan  Related to large right-sided mass    -Continue bowel protocol  -KUB demonstrated small colonic stool and normal gas pattern    Pain, chronic- (present on admission)  Assessment & Plan  Scheduled and as needed breakthrough pain medications. R knee xray showed no evidence of acute fracture but moderate joint effusion and mild to moderate  tricompartmental osteoarthritis    -ROM and swelling improved  -Continue ice packs  -Will uptitrate Allopurinol to home dose of 300mg bid, currently on 200mg bid    SVT (supraventricular tachycardia) (HCC)- (present on admission)  Assessment & Plan  Resolved with IV fluid and 1 dose of IV diltiazem in the ED. During telemetry monitoring, patient had no further episodes of SVT    -Continue Lopressor 25mg bid  -Continue to monitor for signs and symptoms of SVT    Elevated troponin- (present on admission)  Assessment & Plan  Likely secondary to tachyarrhythmia/SVT, no ongoing chest pain, no ST segment elevations or depressions. Troponin trend: 29, 18, 27, 24    -Repeat EKG if he develops any chest pain    MICHAEL (acute kidney injury) (HCC)- (present on admission)  Assessment & Plan  Had acute kidney injury during admission.      -Resolved      Hypokalemia- (present on admission)  Assessment & Plan  Patient has had bouts of hypokalemia during admission    -Current potassium at 4.1      Anemia- (present on admission)  Assessment & Plan  Likley 2/2 chronic GI losses with large colonic mass suspected to be colon cancer.  Received 2 U pRBCs (8/15, 8/5)    -Hgb 8.6 no transfusion at this time      Sepsis (HCC)- (present on admission)  Assessment & Plan  This is Sepsis Present on admission -resolved  SIRS criteria: Tachycardia, with heart rate greater than 90 BPM, Tachypnea, with respirations greater than 20 per minute and Leukocytosis, with WBC greater than 12,000  Source is Suspected abdominal, possibly just gastroenteritis, symptoms of nausea, vomiting, abdominal pain.   Finished courses of cefuroxime (8/9 - 8/13) and flagyl (8/8 - 8/13)     DVT prophylaxis - SCDs

## 2022-08-17 NOTE — DOCUMENTATION QUERY
Formerly [facility], [facility]                                                                       Query Response Note      PATIENT:               LINN DENNIS  ACCT #:                  [id number]  MRN:                     [id number]  :                      1950  ADMIT DATE:       2022 8:43 PM  DISCH DATE:          RESPONDING  PROVIDER #:        [id number]           QUERY TEXT:    Protein calorie malnutrition is documented in the 8/15 Palliative Progress Note.  Per  Dietary Eval, criteria not met for malnutrition.  Can the diagnosis of malnutrition be clarified with supportive clinical indicators?    The patient's Clinical Indicators include:   Dietary Eval:  Malnutrition risk: criteria not met.  BMI 25.01  8/15 Palliative Progress Note: protein calory malnutrition & weight loss , discussed appetite stimulant (pt declined) eating approximately 25% of meals   Risk Factors: poor PO intake  Treatment: advance diet when medically feasible, encourage/ record PO intake, monitor weight    Thank you,  Suni Colbert RN, BSN  Clinical   Connect via [facility]  Options provided:   -- Malnutrition ruled out   -- Malnutrition ruled in (please document supportive clinical indicators), -please specify degree of malnutrition, and document supportive clinical indicators   -- Other explanation, please specify   -- Unable to determine      Query created by: Suni Colbert on 2022 2:42 PM    RESPONSE TEXT:    Malnutrition ruled out          Electronically signed by:  JOSE RAMON OLVERA MD 2022 11:29 AM

## 2022-10-14 ENCOUNTER — TELEPHONE (OUTPATIENT)
Dept: VASCULAR LAB | Facility: MEDICAL CENTER | Age: 72
End: 2022-10-14
Payer: MEDICARE

## 2022-10-14 NOTE — TELEPHONE ENCOUNTER
Chart reviewed in accordance with IVC filter protocol.  Pt on hospice care at home.  Will stevan filter as indefinite.    Amita JACOBS  [facility]
